# Patient Record
Sex: MALE | Race: WHITE | NOT HISPANIC OR LATINO | Employment: OTHER | URBAN - METROPOLITAN AREA
[De-identification: names, ages, dates, MRNs, and addresses within clinical notes are randomized per-mention and may not be internally consistent; named-entity substitution may affect disease eponyms.]

---

## 2017-02-08 ENCOUNTER — GENERIC CONVERSION - ENCOUNTER (OUTPATIENT)
Dept: OTHER | Facility: OTHER | Age: 82
End: 2017-02-08

## 2017-02-22 ENCOUNTER — GENERIC CONVERSION - ENCOUNTER (OUTPATIENT)
Dept: OTHER | Facility: OTHER | Age: 82
End: 2017-02-22

## 2017-06-22 ENCOUNTER — APPOINTMENT (OUTPATIENT)
Dept: LAB | Facility: CLINIC | Age: 82
End: 2017-06-22
Payer: MEDICARE

## 2017-06-22 ENCOUNTER — ALLSCRIPTS OFFICE VISIT (OUTPATIENT)
Dept: OTHER | Facility: OTHER | Age: 82
End: 2017-06-22

## 2017-06-22 DIAGNOSIS — E11.65 TYPE 2 DIABETES MELLITUS WITH HYPERGLYCEMIA (HCC): ICD-10-CM

## 2017-06-22 DIAGNOSIS — E11.8 TYPE 2 DIABETES MELLITUS WITH COMPLICATIONS (HCC): ICD-10-CM

## 2017-06-22 LAB
ALBUMIN SERPL BCP-MCNC: 3.7 G/DL (ref 3.5–5)
ALP SERPL-CCNC: 58 U/L (ref 46–116)
ALT SERPL W P-5'-P-CCNC: 25 U/L (ref 12–78)
ANION GAP SERPL CALCULATED.3IONS-SCNC: 9 MMOL/L (ref 4–13)
AST SERPL W P-5'-P-CCNC: 12 U/L (ref 5–45)
BACTERIA UR QL AUTO: NORMAL /HPF
BASOPHILS # BLD AUTO: 0.04 THOUSANDS/ΜL (ref 0–0.1)
BASOPHILS NFR BLD AUTO: 1 % (ref 0–1)
BILIRUB SERPL-MCNC: 0.48 MG/DL (ref 0.2–1)
BILIRUB UR QL STRIP: NEGATIVE
BUN SERPL-MCNC: 20 MG/DL (ref 5–25)
CALCIUM SERPL-MCNC: 9.3 MG/DL (ref 8.3–10.1)
CHLORIDE SERPL-SCNC: 91 MMOL/L (ref 100–108)
CLARITY UR: CLEAR
CO2 SERPL-SCNC: 28 MMOL/L (ref 21–32)
COLOR UR: YELLOW
CREAT SERPL-MCNC: 1.31 MG/DL (ref 0.6–1.3)
CREAT UR-MCNC: 105 MG/DL
EOSINOPHIL # BLD AUTO: 0.44 THOUSAND/ΜL (ref 0–0.61)
EOSINOPHIL NFR BLD AUTO: 5 % (ref 0–6)
ERYTHROCYTE [DISTWIDTH] IN BLOOD BY AUTOMATED COUNT: 13.7 % (ref 11.6–15.1)
EST. AVERAGE GLUCOSE BLD GHB EST-MCNC: 303 MG/DL
GFR SERPL CREATININE-BSD FRML MDRD: 51.6 ML/MIN/1.73SQ M
GLUCOSE SERPL-MCNC: 420 MG/DL (ref 65–140)
GLUCOSE UR STRIP-MCNC: ABNORMAL MG/DL
HBA1C MFR BLD: 12.2 % (ref 4.2–6.3)
HCT VFR BLD AUTO: 44.5 % (ref 36.5–49.3)
HGB BLD-MCNC: 14.4 G/DL (ref 12–17)
HGB UR QL STRIP.AUTO: NEGATIVE
KETONES UR STRIP-MCNC: NEGATIVE MG/DL
LDLC SERPL DIRECT ASSAY-MCNC: 135 MG/DL (ref 0–100)
LEUKOCYTE ESTERASE UR QL STRIP: NEGATIVE
LYMPHOCYTES # BLD AUTO: 1.82 THOUSANDS/ΜL (ref 0.6–4.47)
LYMPHOCYTES NFR BLD AUTO: 22 % (ref 14–44)
MCH RBC QN AUTO: 30.1 PG (ref 26.8–34.3)
MCHC RBC AUTO-ENTMCNC: 32.4 G/DL (ref 31.4–37.4)
MCV RBC AUTO: 93 FL (ref 82–98)
MICROALBUMIN UR-MCNC: 28.8 MG/L (ref 0–20)
MICROALBUMIN/CREAT 24H UR: 27 MG/G CREATININE (ref 0–30)
MONOCYTES # BLD AUTO: 0.73 THOUSAND/ΜL (ref 0.17–1.22)
MONOCYTES NFR BLD AUTO: 9 % (ref 4–12)
NEUTROPHILS # BLD AUTO: 5.37 THOUSANDS/ΜL (ref 1.85–7.62)
NEUTS SEG NFR BLD AUTO: 63 % (ref 43–75)
NITRITE UR QL STRIP: NEGATIVE
NON-SQ EPI CELLS URNS QL MICRO: NORMAL /HPF
NRBC BLD AUTO-RTO: 0 /100 WBCS
PH UR STRIP.AUTO: 5.5 [PH] (ref 4.5–8)
PLATELET # BLD AUTO: 349 THOUSANDS/UL (ref 149–390)
PMV BLD AUTO: 10.1 FL (ref 8.9–12.7)
POTASSIUM SERPL-SCNC: 5.4 MMOL/L (ref 3.5–5.3)
PROT SERPL-MCNC: 7.8 G/DL (ref 6.4–8.2)
PROT UR STRIP-MCNC: NEGATIVE MG/DL
RBC # BLD AUTO: 4.79 MILLION/UL (ref 3.88–5.62)
RBC #/AREA URNS AUTO: NORMAL /HPF
SODIUM SERPL-SCNC: 128 MMOL/L (ref 136–145)
SP GR UR STRIP.AUTO: 1.02 (ref 1–1.03)
TSH SERPL DL<=0.05 MIU/L-ACNC: 2.63 UIU/ML (ref 0.36–3.74)
UROBILINOGEN UR QL STRIP.AUTO: 0.2 E.U./DL
WBC # BLD AUTO: 8.44 THOUSAND/UL (ref 4.31–10.16)
WBC #/AREA URNS AUTO: NORMAL /HPF

## 2017-06-22 PROCEDURE — 81001 URINALYSIS AUTO W/SCOPE: CPT

## 2017-06-22 PROCEDURE — 82043 UR ALBUMIN QUANTITATIVE: CPT

## 2017-06-22 PROCEDURE — 85025 COMPLETE CBC W/AUTO DIFF WBC: CPT

## 2017-06-22 PROCEDURE — 80053 COMPREHEN METABOLIC PANEL: CPT

## 2017-06-22 PROCEDURE — 84443 ASSAY THYROID STIM HORMONE: CPT

## 2017-06-22 PROCEDURE — 36415 COLL VENOUS BLD VENIPUNCTURE: CPT

## 2017-06-22 PROCEDURE — 83721 ASSAY OF BLOOD LIPOPROTEIN: CPT

## 2017-06-22 PROCEDURE — 82570 ASSAY OF URINE CREATININE: CPT

## 2017-06-22 PROCEDURE — 83036 HEMOGLOBIN GLYCOSYLATED A1C: CPT

## 2017-08-02 ENCOUNTER — ALLSCRIPTS OFFICE VISIT (OUTPATIENT)
Dept: OTHER | Facility: OTHER | Age: 82
End: 2017-08-02

## 2018-01-08 ENCOUNTER — ALLSCRIPTS OFFICE VISIT (OUTPATIENT)
Dept: OTHER | Facility: OTHER | Age: 83
End: 2018-01-08

## 2018-01-09 NOTE — PROGRESS NOTES
Assessment   1  Frostbite with tissue necrosis of finger, initial encounter (991 1) (T34 432Y)    Discussion/Summary      Severe frostbite of the distal fingers  High risk of gangrene  Tetanus diphtheria given today  Above all, no further cold exposure  Keep the hand warm  Do not go outside unless absolutely necessary  referral if open lesions should develop  back next week  History of Present Illness   HPI: An 80-year-old man was diabetic fell outside and had severe cold exposure to his right hand but tick your for about 5 minutes  He has sustained second-degree frostbite  The right finger tips are cool and there are slight superficial blisters noted on 1 digit  The distal phalanges are swollen  paresthesias  open areas systemic symptoms  Review of Systems        Constitutional: no fever-- and-- no chills  Cardiovascular: no chest pain  Integumentary: skin wound, but-- as noted in HPI  Neurological: no numbness  Active Problems   1  Advance directive discussed with patient (V65 49) (Z71 89)   2  Alzheimer's dementia without behavioral disturbance, unspecified timing of dementia     onset (331 0,294 10) (G30 9,F02 80)   3  Coronary arteriosclerosis (414 00) (I25 10)   4  Diabetes mellitus type 2, uncontrolled, with complications (948 18) (I02 0,M36 42)   5  Dyspnea on exertion (786 09) (R06 09)   6  Encounter for prostate cancer screening (V76 44) (Z12 5)   7  Frequent falls (V15 88) (R29 6)   8  HHD (hypertensive heart disease) (402 90) (I11 9)   9  History of congestive heart failure (V12 59) (Z86 79)   10  Hyperlipidemia (272 4) (E78 5)   11  Hypertension (401 9) (I10)   12  Presence of cardiac pacemaker (V45 01) (Z95 0)   13  S/P CABG (coronary artery bypass graft) (V45 81) (Z95 1)   14   Screening for genitourinary condition (V81 6) (Z13 89)    Social History    · Alcohol Use (History)   · rarely   · Former smoker (L40 77) (U67 910)   · Former tobacco use (V15 82) (C22 400)  The social history was reviewed and updated today  Family History   Family History Reviewed: The family history was reviewed and updated today  Current Meds    1  Aspirin 81 MG TABS; TAKE 1 TABLET DAILY; Therapy: 10Sep2015 to (Evaluate:10Oct2015); Last Rx:10Sep2015 Ordered   2  BD Insulin Syringe Ultrafine 30G X 1/2 0 3 ML Miscellaneous; PT USES BID; Therapy: 09XTB4118 to (Last Rx:24Mar2017)  Requested for: 24Mar2017 Ordered   3  Donepezil HCl - 10 MG Oral Tablet; take 1 tablet by mouth once daily; Therapy: 29ETU1996 to (Evaluate:23Sep2018)  Requested for: 18HJV1317; Last     Rx:28Sep2017 Ordered   4  Furosemide 20 MG Oral Tablet; take 1 tablet by mouth once daily; Therapy: 69AJZ4078 to (Evaluate:18Apr2018)  Requested for: 48DMZ1825; Last     Rx:60Plj1233 Ordered   5  Lisinopril 2 5 MG Oral Tablet; take 1 tablet by mouth once daily; Therapy: 10Sep2015 to (Evaluate:09Aug2018)  Requested for: 07Zoj3811; Last     Rx:86Qcb1813 Ordered   6  MetFORMIN HCl  MG Oral Tablet Extended Release 24 Hour; TAKE 4 TABLET     Daily; Therapy: 49UPX9954 to (Evaluate:10Mar2018)  Requested for: 06HHJ3720; Last     Rx:15Mar2017 Ordered   7  Metoprolol Succinate ER 50 MG Oral Tablet Extended Release 24 Hour; take 1 tablet by     mouth once daily; Therapy: 10Sep2015 to (Renew:10Dec2017)  Requested for: 21FIJ8971; Last     Rx:13Jun2017 Ordered   8  Nitroglycerin 0 4 MG Sublingual Tablet Sublingual; PLACE 1 TABLET UNDER THE     TONGUE EVERY 5 MINUTES FOR UP TO 3 DOSES AS NEEDED FOR CHEST     PAIN  CALL 911 IF PAIN PERSISTS; Therapy: 73Uzk0512 to (Evaluate:04Sep2017)  Requested for: 54SRU2571; Last     Rx:08Nov2016 Ordered   9  NovoLIN 70/30 (70-30) 100 UNIT/ML Subcutaneous Suspension; Inject 30 Units     Subcutaneously Twice a Day; Therapy: 15QBP1393 to [phone])  Requested for: 50CJP9699; Last     Rx:04Jan2018 Ordered   10   Omeprazole 20 MG Oral Capsule Delayed Release; take 1 capsule by mouth once daily; Therapy: 07FWB7565 to (Evaluate:06Jun2017)  Requested for: 93GZO6959; Last      Rx:14Cpi6988 Ordered   11  Oxybutynin Chloride 5 MG Oral Tablet; take 1 tablet by mouth twice a day; Therapy: 58SVP0469 to (Evaluate:06Jun2017)  Requested for: 29VJJ3220; Last      Rx:06Drf8473 Ordered   12  Pravastatin Sodium 40 MG Oral Tablet; take 1 tablet by mouth once daily; Therapy: 74VOJ6637 to (Evaluate:25Rcd2792)  Requested for: 25FAL4639; Last      Rx:13Jun2017 Ordered    Allergies   1  No Known Drug Allergies    Vitals    Recorded: 44BCA1160 05:44PM   Heart Rate 86   Systolic 379   Diastolic 64   Height 5 ft 8 5 in   Weight 171 lb 4 0 oz   BMI Calculated 25 66   BSA Calculated 1 92   O2 Saturation 96     Physical Exam        Constitutional      General appearance: No acute distress, well appearing and well nourished  appearance reflects stated age  Pulmonary      Respiratory effort: No increased work of breathing or signs of respiratory distress         Skin      Examination of the skin for lesions: Abnormal  -- Swelling of right hand distal phalanges with no tenderness; 2 areas of very minimal       Psychiatric      Orientation to person, place and time: Normal        Mood and affect: Normal           Future Appointments      Date/Time Provider Specialty Site   05/04/2018 09:40 AM Cardiology, [facility]  [address] [address]    Electronically signed by : MARIA ELENA Hackett ; Jan 8 2018  5:56PM EST                       (Author)

## 2018-01-12 VITALS
WEIGHT: 181.25 LBS | HEART RATE: 83 BPM | BODY MASS INDEX: 26.84 KG/M2 | HEIGHT: 69 IN | SYSTOLIC BLOOD PRESSURE: 108 MMHG | OXYGEN SATURATION: 97 % | DIASTOLIC BLOOD PRESSURE: 64 MMHG

## 2018-01-13 NOTE — PROGRESS NOTES
Assessment    1  Encounter for preventive health examination (V70 0) (Z00 00)    Discussion/Summary  Impression: Initial Annual Wellness Visit, with preventive exam as well as age and risk appropriate counseling completed  Cardiovascular screening and counseling: screening not indicated and Dx - V81 2 Screen for CV Disorder  Diabetes screening and counseling: screening not indicated and Dx - V77 1 Screen for DM  Colorectal cancer screening and counseling: screening is current and Dx - V76 51 Screen for CRC  Prostate cancer screening and counseling: screening not indicated and Dx - V76 44 Screen PSA  Osteoporosis screening and counseling: counseling was given on obtaining adequate amounts of calcium and vitamin D on a daily basis  Abdominal aortic aneurysm screening and counseling: screening not indicated and Dx - V81 2 Screen for CV Disorder  Glaucoma screening and counseling: screening is current and Dx - V80 1 Screen for Glaucoma  HIV screening and counseling: screening not indicated  Immunizations: influenza vaccine is up to date this year, the lifetime pneumococcal vaccine has been completed and hepatitis B vaccination series is not indicated at this time due to the patient's low risk of jose the disease  Advance Directive Planning: complete and up to date  Patient Discussion: plan discussed with the patient, follow-up visit needed in one year  History of Present Illness  Welcome to Medicare and Wellness Visits: The patient is being seen for the initial annual wellness visit  Medicare Screening and Risk Factors   Hospitalizations: no previous hospitalizations  Once per lifetime medicare screening tests: AAA screening [place] has not yet been done  Medicare Screening Tests Risk Questions   Abdominal aortic aneurysm risk assessment: tobacco use and 40 yrs ago  Osteoporosis risk assessment:  and Shoulder, but none indicated  Drug and Alcohol Use:  The patient is a former cigarette smoker and quit smoking 40 yrs ago  The patient reports rare alcohol use  He has never used illicit drugs  Diet and Physical Activity: Current diet includes well balanced meals  He is sedentary  Mood Disorder and Cognitive Impairment Screening: He denies feeling down, depressed, or hopeless over the past two weeks  He denies feeling little interest or pleasure in doing things over the past two weeks  Cognitive impairment screening: denies difficulty learning/retaining new information, denies difficulty handling complex tasks, denies difficulty with reasoning, denies difficulty with spatial ability and orientation, denies difficulty with language and denies difficulty with behavior  Functional Ability/Level of Safety: Hearing is significantly decreased and a hearing aid is used  He reports hearing difficulties  The patient is currently able to do activities of daily living without limitations, able to participate in social activities without limitations and able to drive without limitations  Activities of daily living details: does not need help using the phone, no transportation help needed, does not need help shopping, no meal preparation help needed, does not need help doing housework, does not need help doing laundry, does not need help managing medications and does not need help managing money  Advance Directives: Advance directives: living will, durable power of  for health care directives and advance directives  Co-Managers and Medical Equipment/Suppliers: See Patient Care Team      Review of Systems    Over the past 2 weeks, how often have you been bothered by the following problems? 1 ) Little interest or pleasure in doing things? Not at all    2 ) Feeling down, depressed or hopeless? Not at all    3 ) Trouble falling asleep or sleeping too much? Not at all    4 ) Feeling tired or having little energy? Not at all    5 ) Poor appetite or overeating?  Not at all    6 ) Feeling bad about yourself, or that you are a failure, or have let yourself or your family down? Not at all    7 ) Trouble concentrating on things, such as reading a newspaper or watching television? Not at all    8 ) Moving or speaking so slowly that other people could have noticed, or the opposite, moving or speaking faster than usual? Not at all  How difficult have these problems made it for you to do your work, take care of things at home, or get along with people? Not at all  Score       Active Problems    1  Alzheimer's dementia without behavioral disturbance, unspecified timing of dementia   onset (331 0,294 10) (G30 9,F02 80)   2  Coronary arteriosclerosis (414 00) (I25 10)   3  Diabetes mellitus type 2, uncontrolled, with complications (979 96) (L59 1,P19 85)   4  Dyspnea on exertion (786 09) (R06 09)   5  Encounter for prostate cancer screening (V76 44) (Z12 5)   6  Frequent falls (V15 88) (R29 6)   7  HHD (hypertensive heart disease) (402 90) (I11 9)   8  History of congestive heart failure (V12 59) (Z86 79)   9  Hyperlipidemia (272 4) (E78 5)   10  Hypertension (401 9) (I10)   11  Presence of cardiac pacemaker (V45 01) (Z95 0)   12  S/P CABG (coronary artery bypass graft) (V45 81) (Z95 1)    Past Medical History    · History of Colonoscopy (Fiberoptic)   · History of congestive heart failure (V12 59) (Z86 79)    Surgical History    · History of CABG    Family History  Mother    · No pertinent family history    Social History    · Alcohol Use (History)   · rarely   · Former smoker (V15 82) (N34 294)    Current Meds   1  Aspirin 81 MG TABS; TAKE 1 TABLET DAILY; Therapy: 86Uwq3242 to (Evaluate:10Oct2015); Last Rx:25Wmi0974 Ordered   2  BD Insulin Syringe Ultrafine 30G X 1/2" 0 3 ML Miscellaneous; PT USES BID; Therapy: 16FZR3880 to (Last Rx:24Mar2017)  Requested for: 24Mar2017 Ordered   3   Donepezil HCl - 10 MG Oral Tablet; TAKE 1 TAB QD;   Therapy: 19IQV2362 to (Last Rx:07Nov2016)  Requested for: 40JZF8842 Ordered   4  Furosemide 20 MG Oral Tablet; take 1 tablet by mouth once daily; Therapy: 07OWQ3546 to (Catalina Sherwood)  Requested for: 71SVO4647; Last   RX:26XSK6161 Ordered   5  Lisinopril 2 5 MG Oral Tablet; take 1 tablet by mouth once daily; Therapy: 82Exo6386 to (Evaluate:24Tty4558)  Requested for: 64Xrj9120; Last   Rx:13Apr2017 Ordered   6  MetFORMIN HCl  MG Oral Tablet Extended Release 24 Hour; TAKE 4 TABLET   Daily; Therapy: 73NCJ2069 to (Evaluate:10Mar2018)  Requested for: 32XSS2446; Last   Rx:15Mar2017 Ordered   7  Metoprolol Succinate ER 50 MG Oral Tablet Extended Release 24 Hour; take 1 tablet by   mouth once daily; Therapy: 54Vkg4551 to (Renew:45Ics8881)  Requested for: 36YPC2717; Last   Rx:13Jun2017 Ordered   8  Nitroglycerin 0 4 MG Sublingual Tablet Sublingual; PLACE 1 TABLET UNDER THE   TONGUE EVERY 5 MINUTES FOR UP TO 3 DOSES AS NEEDED FOR CHEST   PAIN  CALL 911 IF PAIN PERSISTS; Therapy: 94Jgz9339 to (Evaluate:04Sep2017)  Requested for: 29TAH7110; Last   Rx:08Nov2016 Ordered   9  NovoLIN 70/30 (70-30) 100 UNIT/ML Subcutaneous Suspension; pt  to inject 30 units   bid; Therapy: 47KQK5167 to (Last Rx:24Mar2017)  Requested for: 24Mar2017 Ordered   10  Omeprazole 20 MG Oral Capsule Delayed Release; take 1 capsule by mouth once daily; Therapy: 12MVM5497 to (Evaluate:06Jun2017)  Requested for: 95YZN4973; Last    Rx:81Nap2187 Ordered   11  Oxybutynin Chloride 5 MG Oral Tablet; take 1 tablet by mouth twice a day; Therapy: 40MNJ1648 to (Evaluate:06Jun2017)  Requested for: 44IEH9298; Last    Rx:06Feb2017 Ordered   12  Pravastatin Sodium 40 MG Oral Tablet; take 1 tablet by mouth once daily; Therapy: 49VKY1580 to (Evaluate:11Sep2017)  Requested for: 75TIE5268; Last    Rx:13Jun2017 Ordered    Allergies    1   No Known Drug Allergies    Immunizations   1 2 3 4    Influenza  05-Nov-2009 06-Oct-2010 26-Aug-2013 25-Sep-2015    PPSV  27-Jul-2015       Tdap  possibly over 10 yrs within 10yrs       Vitals  Signs    Heart Rate: 76  Systolic: 571  Diastolic: 50  Height: 5 ft 8 5 in  Weight: 176 lb 4 00 oz  BMI Calculated: 26 41  BSA Calculated: 1 94  O2 Saturation: 98    Future Appointments    Date/Time Provider Specialty Site   05/04/2018 09:40 AM Cardiology, [facility] Clin [facility]  [address]     Signatures   Electronically signed by : MARIA ELENA Willis ; Jun 22 2017  1:18PM EST                       (Author)

## 2018-01-14 VITALS
HEIGHT: 69 IN | OXYGEN SATURATION: 98 % | DIASTOLIC BLOOD PRESSURE: 50 MMHG | BODY MASS INDEX: 26.11 KG/M2 | WEIGHT: 176.25 LBS | SYSTOLIC BLOOD PRESSURE: 110 MMHG | HEART RATE: 76 BPM

## 2018-01-17 ENCOUNTER — GENERIC CONVERSION - ENCOUNTER (OUTPATIENT)
Dept: OTHER | Facility: OTHER | Age: 83
End: 2018-01-17

## 2018-01-23 VITALS
HEART RATE: 86 BPM | DIASTOLIC BLOOD PRESSURE: 64 MMHG | BODY MASS INDEX: 25.36 KG/M2 | SYSTOLIC BLOOD PRESSURE: 124 MMHG | HEIGHT: 69 IN | OXYGEN SATURATION: 96 % | WEIGHT: 171.25 LBS

## 2018-01-24 VITALS
HEART RATE: 81 BPM | HEIGHT: 69 IN | SYSTOLIC BLOOD PRESSURE: 130 MMHG | WEIGHT: 165 LBS | BODY MASS INDEX: 24.44 KG/M2 | DIASTOLIC BLOOD PRESSURE: 70 MMHG

## 2018-03-02 DIAGNOSIS — E11.22 TYPE 2 DIABETES MELLITUS WITH STAGE 4 CHRONIC KIDNEY DISEASE, WITH LONG-TERM CURRENT USE OF INSULIN (HCC): Primary | ICD-10-CM

## 2018-03-02 DIAGNOSIS — N18.4 TYPE 2 DIABETES MELLITUS WITH STAGE 4 CHRONIC KIDNEY DISEASE, WITH LONG-TERM CURRENT USE OF INSULIN (HCC): Primary | ICD-10-CM

## 2018-03-02 DIAGNOSIS — Z79.4 TYPE 2 DIABETES MELLITUS WITH STAGE 4 CHRONIC KIDNEY DISEASE, WITH LONG-TERM CURRENT USE OF INSULIN (HCC): Primary | ICD-10-CM

## 2018-03-02 RX ORDER — HUMAN INSULIN 100 [USP'U]/ML
INJECTION, SUSPENSION SUBCUTANEOUS
Qty: 10 ML | Refills: 5 | Status: SHIPPED | OUTPATIENT
Start: 2018-03-02 | End: 2019-03-26 | Stop reason: SDUPTHER

## 2018-03-15 ENCOUNTER — OFFICE VISIT (OUTPATIENT)
Dept: INTERNAL MEDICINE CLINIC | Facility: CLINIC | Age: 83
End: 2018-03-15
Payer: MEDICARE

## 2018-03-15 VITALS
DIASTOLIC BLOOD PRESSURE: 68 MMHG | HEART RATE: 79 BPM | BODY MASS INDEX: 24.22 KG/M2 | SYSTOLIC BLOOD PRESSURE: 122 MMHG | OXYGEN SATURATION: 96 % | WEIGHT: 169.2 LBS | HEIGHT: 70 IN

## 2018-03-15 DIAGNOSIS — H61.23 BILATERAL IMPACTED CERUMEN: Primary | ICD-10-CM

## 2018-03-15 PROCEDURE — 99213 OFFICE O/P EST LOW 20 MIN: CPT | Performed by: INTERNAL MEDICINE

## 2018-03-16 PROBLEM — H61.23 BILATERAL IMPACTED CERUMEN: Status: ACTIVE | Noted: 2018-03-16

## 2018-03-16 NOTE — PROGRESS NOTES
Assessment/Plan:       There are no diagnoses linked to this encounter  Subjective:      Patient ID: Ancelmo Patrick is a 80 y o  male  Referred from audiology with bilateral wax impactions        The following portions of the patient's history were reviewed and updated as appropriate:   He has a past medical history of Congestive heart failure (CHF) ([facility] [address] ); Diabetes mellitus ([facility] [address] ); Hyperlipidemia; and Hypertension  ,   does not have a problem list on file  ,   has a past surgical history that includes Colonoscopy and Coronary artery bypass graft  ,  family history includes No Known Problems in his mother  ,   reports that he has quit smoking  He has quit using smokeless tobacco  He reports that he drinks alcohol  His drug history is not on file  ,  has No Known Allergies     Current Outpatient Prescriptions   Medication Sig Dispense Refill    acetaminophen-codeine (TYLENOL #3) 300-30 mg per tablet take 1 tablet by mouth every 4 hours if needed for pain  0    aspirin 81 MG tablet Take 1 tablet by mouth daily      donepezil (ARICEPT) 10 mg tablet Take 1 tablet by mouth daily      furosemide (LASIX) 20 mg tablet Take by mouth      insulin isophane-insulin regular (HUMULIN 70/30 KWIKPEN) 100 Units/mL SUPN Inject under the skin      Insulin Syringe-Needle U-100 (B-D INS SYR ULTRAFINE  3CC/30G) 30G X 1/2" 0 3 ML MISC by Does not apply route      lisinopril (ZESTRIL) 2 5 mg tablet Take by mouth      metFORMIN (GLUMETZA) 1000 MG (MOD) 24 hr tablet Take by mouth 2 (two) times a day with meals      metoprolol succinate (TOPROL-XL) 50 mg 24 hr tablet Take by mouth      nitroglycerin (NITROSTAT) 0 4 mg SL tablet Place under the tongue      NOVOLIN 70/30 (70-30) 100 UNIT/ML subcutaneous injection inject 30 units subcutaneously twice a day 10 mL 5    omeprazole (PriLOSEC) 10 mg delayed release capsule Take by mouth      oxybutynin (DITROPAN) 5 mg tablet Take by mouth      pravastatin (PRAVACHOL) 40 mg tablet Take by mouth       No current facility-administered medications for this visit  Review of Systems   Constitutional: Negative for activity change and appetite change  HENT: Positive for hearing loss  Negative for postnasal drip, sinus pain and sinus pressure  Respiratory: Negative for cough and shortness of breath  Objective:  Vitals:    03/15/18 1343   BP: 122/68   Pulse: 79   SpO2: 96%      Physical Exam   Constitutional:   Elderly and appears stated age, a bit thin, but in no immediate distress  HENT:   Right Ear: Decreased hearing is noted  Left Ear: Decreased hearing is noted  Bilateral wax impaction    Procedure note:  Informed consent given verbally  Both ears flushed with warm water until clear; both had impactions, the right much worse  After which, mucosa was normal with no laceration and tympanic membranes were just a tiny bit dull  Hearing back to baseline  Cardiovascular: Normal rate  Pulmonary/Chest: Effort normal and breath sounds normal  No respiratory distress

## 2018-05-15 DIAGNOSIS — N40.0 BENIGN PROSTATIC HYPERPLASIA, UNSPECIFIED WHETHER LOWER URINARY TRACT SYMPTOMS PRESENT: Primary | ICD-10-CM

## 2018-05-15 RX ORDER — OXYBUTYNIN CHLORIDE 5 MG/1
TABLET ORAL
Qty: 60 TABLET | Refills: 3 | Status: SHIPPED | OUTPATIENT
Start: 2018-05-15 | End: 2019-06-07 | Stop reason: SDUPTHER

## 2018-08-08 LAB — HBA1C MFR BLD HPLC: 13.9 %

## 2018-08-09 ENCOUNTER — TELEPHONE (OUTPATIENT)
Dept: INTERNAL MEDICINE CLINIC | Facility: CLINIC | Age: 83
End: 2018-08-09

## 2018-08-09 NOTE — TELEPHONE ENCOUNTER
MARTHA Santos CALLED AND SAID PATIENT IS IN [place] [address] AND THEY WANT THE OFFICE NOTES FROM 8/2/17 AND THE OFFICE NOTES FROM 6/22/17   ALSO NEEDS THE ECHO FROM 11/30/15 AND THE EKG FROM 11/4/16 FAXED OVER  FAX #  [phone]  OFFICE #  [phone] TA[facility]

## 2018-08-27 ENCOUNTER — OFFICE VISIT (OUTPATIENT)
Dept: INTERNAL MEDICINE CLINIC | Facility: CLINIC | Age: 83
End: 2018-08-27
Payer: MEDICARE

## 2018-08-27 VITALS
HEART RATE: 77 BPM | SYSTOLIC BLOOD PRESSURE: 110 MMHG | BODY MASS INDEX: 25.09 KG/M2 | TEMPERATURE: 98.1 F | OXYGEN SATURATION: 97 % | DIASTOLIC BLOOD PRESSURE: 60 MMHG | WEIGHT: 172.4 LBS

## 2018-08-27 DIAGNOSIS — F02.80 ALZHEIMER'S DEMENTIA WITHOUT BEHAVIORAL DISTURBANCE, UNSPECIFIED TIMING OF DEMENTIA ONSET (HCC): ICD-10-CM

## 2018-08-27 DIAGNOSIS — G30.9 ALZHEIMER'S DEMENTIA WITHOUT BEHAVIORAL DISTURBANCE, UNSPECIFIED TIMING OF DEMENTIA ONSET (HCC): ICD-10-CM

## 2018-08-27 DIAGNOSIS — Z79.4 TYPE 2 DIABETES MELLITUS WITHOUT COMPLICATION, WITH LONG-TERM CURRENT USE OF INSULIN (HCC): Primary | ICD-10-CM

## 2018-08-27 DIAGNOSIS — I10 HYPERTENSION, ESSENTIAL: ICD-10-CM

## 2018-08-27 DIAGNOSIS — E11.9 TYPE 2 DIABETES MELLITUS WITHOUT COMPLICATION, WITH LONG-TERM CURRENT USE OF INSULIN (HCC): Primary | ICD-10-CM

## 2018-08-27 PROCEDURE — 99214 OFFICE O/P EST MOD 30 MIN: CPT | Performed by: PHYSICIAN ASSISTANT

## 2018-08-27 NOTE — PROGRESS NOTES
Assessment/Plan:  I filled out his 's license form ensuring that he will not be able to drive blood sugars are badly out of control his memory is poor his insight is poor he is living alone but he needs assisted living I discussed this with his daughter-in-law  I reviewed all of his recent labs       Diagnoses and all orders for this visit:    Type 2 diabetes mellitus without complication, with long-term current use of insulin (HCC)    Hypertension, essential    Alzheimer's dementia without behavioral disturbance, unspecified timing of dementia onset        No problem-specific Assessment & Plan notes found for this encounter  Subjective:      Patient ID: Saint Lively is a 80 y o  male  He is here for forms to be filled out so he can drive a car  He was recently hospitalized at [facility] with recurrent fall  He is a previous history of fractured C2 treated last year with a hard collar  His c2 fracture was noted to be more open  He also had several rib fractures on the left  His rib fractures do not bother him no chest pain or shortness of breath his followed closely by neuro surgery and a hard collar was recommended  He was discharged to his home his A1c was 13  He lives alone he takes care of his own medications  He is supposedly starting with visiting nurses tomorrow  His hygiene is been poor  His wife  a few months ago      Neck Injury    Pertinent negatives include no chest pain, fever, headaches, numbness, photophobia, trouble swallowing or weakness  The following portions of the patient's history were reviewed and updated as appropriate:   He has a past medical history of Congestive heart failure (CHF) ([facility] [address] ); Diabetes mellitus ([facility] [address] ); History of colonoscopy; Hyperlipidemia; and Hypertension  ,   does not have any pertinent problems on file  ,   has a past surgical history that includes Colonoscopy and Coronary artery bypass graft  ,  family history includes No Known Problems in his mother  ,   reports that he has quit smoking  He has never used smokeless tobacco  He reports that he drinks alcohol  He reports that he does not use drugs  ,  has No Known Allergies     Current Outpatient Prescriptions   Medication Sig Dispense Refill    aspirin 81 MG tablet Take 1 tablet by mouth daily      insulin isophane-insulin regular (HUMULIN 70/30 KWIKPEN) 100 Units/mL SUPN Inject under the skin      Insulin Syringe-Needle U-100 (B-D INS SYR ULTRAFINE  3CC/30G) 30G X 1/2" 0 3 ML MISC by Does not apply route      lisinopril (ZESTRIL) 2 5 mg tablet Take by mouth      metFORMIN (GLUMETZA) 1000 MG (MOD) 24 hr tablet Take by mouth 2 (two) times a day with meals      NOVOLIN 70/30 (70-30) 100 UNIT/ML subcutaneous injection inject 30 units subcutaneously twice a day 10 mL 5    omeprazole (PriLOSEC) 10 mg delayed release capsule Take by mouth      acetaminophen-codeine (TYLENOL #3) 300-30 mg per tablet take 1 tablet by mouth every 4 hours if needed for pain  0    donepezil (ARICEPT) 10 mg tablet Take 1 tablet by mouth daily      furosemide (LASIX) 20 mg tablet Take by mouth      metoprolol succinate (TOPROL-XL) 50 mg 24 hr tablet Take by mouth      nitroglycerin (NITROSTAT) 0 4 mg SL tablet Place under the tongue      oxybutynin (DITROPAN) 5 mg tablet take 1 tablet by mouth twice a day (Patient not taking: Reported on 8/27/2018) 60 tablet 3    pravastatin (PRAVACHOL) 40 mg tablet Take by mouth       No current facility-administered medications for this visit  Review of Systems   Constitutional: Negative for activity change, appetite change, chills, diaphoresis, fatigue, fever and unexpected weight change  HENT: Negative for congestion, dental problem, drooling, ear discharge, ear pain, facial swelling, hearing loss, nosebleeds, postnasal drip, rhinorrhea, sinus pain, sinus pressure, sneezing, sore throat, tinnitus, trouble swallowing and voice change      Eyes: Negative for photophobia, pain, discharge, redness, itching and visual disturbance  Respiratory: Negative for apnea, cough, choking, chest tightness, shortness of breath, wheezing and stridor  Cardiovascular: Negative for chest pain, palpitations and leg swelling  Gastrointestinal: Negative for abdominal distention, abdominal pain, anal bleeding, blood in stool, constipation, diarrhea, nausea, rectal pain and vomiting  Endocrine: Negative for cold intolerance, heat intolerance, polydipsia, polyphagia and polyuria  Genitourinary: Negative for decreased urine volume, difficulty urinating, dysuria, enuresis, flank pain, frequency, genital sores, hematuria and urgency  Musculoskeletal: Negative for arthralgias, back pain, gait problem, joint swelling, myalgias, neck pain and neck stiffness  Skin: Negative for color change, pallor, rash and wound  Neurological: Negative for dizziness, tremors, seizures, syncope, facial asymmetry, speech difficulty, weakness, light-headedness, numbness and headaches  Hematological: Negative for adenopathy  Does not bruise/bleed easily  Psychiatric/Behavioral: Positive for confusion  Negative for agitation, behavioral problems, decreased concentration, dysphoric mood, hallucinations, self-injury, sleep disturbance and suicidal ideas  The patient is not nervous/anxious and is not hyperactive  Objective:  Vitals:    08/27/18 1621   BP: 110/60   BP Location: Left arm   Patient Position: Sitting   Cuff Size: Standard   Pulse: 77   Temp: 98 1 °F (36 7 °C)   SpO2: 97%   Weight: 78 2 kg (172 lb 6 4 oz)     Body mass index is 25 09 kg/m²  Physical Exam   Constitutional: He is oriented to person, place, and time  He appears well-developed  HENT:   Head: Normocephalic  Right Ear: External ear normal    Left Ear: External ear normal    Mouth/Throat: Oropharynx is clear and moist    Eyes: Conjunctivae are normal  Pupils are equal, round, and reactive to light     Neck: Neck supple  No thyromegaly present  Cardiovascular: Normal rate, regular rhythm, normal heart sounds and intact distal pulses  No murmur heard  Pulmonary/Chest: Effort normal and breath sounds normal  No respiratory distress  Abdominal: Soft  Bowel sounds are normal  He exhibits no distension  Musculoskeletal: Normal range of motion  Neurological: He is alert and oriented to person, place, and time  He displays abnormal reflex (Symmetrically diminished)  No cranial nerve deficit  He exhibits normal muscle tone  Abnormal coordination: Wide-based ataxic gait  Skin: Skin is warm and dry     Psychiatric:   Poor judgment poor memory

## 2018-10-01 ENCOUNTER — OFFICE VISIT (OUTPATIENT)
Dept: INTERNAL MEDICINE CLINIC | Facility: CLINIC | Age: 83
End: 2018-10-01
Payer: MEDICARE

## 2018-10-01 VITALS
BODY MASS INDEX: 25.24 KG/M2 | OXYGEN SATURATION: 94 % | HEART RATE: 87 BPM | WEIGHT: 173.4 LBS | DIASTOLIC BLOOD PRESSURE: 72 MMHG | SYSTOLIC BLOOD PRESSURE: 120 MMHG | TEMPERATURE: 97.7 F

## 2018-10-01 DIAGNOSIS — I10 HYPERTENSION, ESSENTIAL: ICD-10-CM

## 2018-10-01 DIAGNOSIS — Z79.4 TYPE 2 DIABETES MELLITUS WITHOUT COMPLICATION, WITH LONG-TERM CURRENT USE OF INSULIN (HCC): ICD-10-CM

## 2018-10-01 DIAGNOSIS — G30.9 ALZHEIMER'S DEMENTIA WITHOUT BEHAVIORAL DISTURBANCE, UNSPECIFIED TIMING OF DEMENTIA ONSET (HCC): ICD-10-CM

## 2018-10-01 DIAGNOSIS — E11.9 TYPE 2 DIABETES MELLITUS WITHOUT COMPLICATION, UNSPECIFIED WHETHER LONG TERM INSULIN USE (HCC): Primary | ICD-10-CM

## 2018-10-01 DIAGNOSIS — F02.80 ALZHEIMER'S DEMENTIA WITHOUT BEHAVIORAL DISTURBANCE, UNSPECIFIED TIMING OF DEMENTIA ONSET (HCC): ICD-10-CM

## 2018-10-01 DIAGNOSIS — E11.9 TYPE 2 DIABETES MELLITUS WITHOUT COMPLICATION, WITH LONG-TERM CURRENT USE OF INSULIN (HCC): ICD-10-CM

## 2018-10-01 LAB
LEFT EYE DIABETIC RETINOPATHY: NORMAL
LEFT EYE IMAGE QUALITY: NORMAL
RIGHT EYE DIABETIC RETINOPATHY: NORMAL
RIGHT EYE IMAGE QUALITY: NORMAL
SEVERITY (EYE EXAM): NORMAL

## 2018-10-01 PROCEDURE — 92250 FUNDUS PHOTOGRAPHY W/I&R: CPT | Performed by: PHYSICIAN ASSISTANT

## 2018-10-01 PROCEDURE — 99214 OFFICE O/P EST MOD 30 MIN: CPT | Performed by: PHYSICIAN ASSISTANT

## 2018-10-01 NOTE — PROGRESS NOTES
Assessment/Plan:  He is here with his daughter-in-law  He still wants to drive his sugars are badly out of control because he forgets to take his insulin  I stressed to him that it is now time for him to have assisted living for help with his ADLs and his medication  His memory is poor his judgment is not good  We will be will consider assisted living he has a bed       Diagnoses and all orders for this visit:    Type 2 diabetes mellitus without complication, unspecified whether long term insulin use ([facility] )  -     POCT diabetic eye exam    Type 2 diabetes mellitus without complication, with long-term current use of insulin ([facility])    Hypertension, essential    Alzheimer's dementia without behavioral disturbance, unspecified timing of dementia onset        No problem-specific Assessment & Plan notes found for this encounter  Subjective:      Patient ID: Mee Gomes is a 80 y o  male  He is back for follow-up his blood sugars are still generally in the 300s not taking his medications properly he has nurse's aide 3 hours a day in the morning to help with his meds and ADLs but this is not enough  He still wants to be able to drive  He feels well has no complaints his appetite is good no recent falls        The following portions of the patient's history were reviewed and updated as appropriate:   He has a past medical history of Congestive heart failure (CHF) ([facility] ); Diabetes mellitus ([facility] ); History of colonoscopy; Hyperlipidemia; and Hypertension  ,   does not have any pertinent problems on file  ,   has a past surgical history that includes Colonoscopy and Coronary artery bypass graft  ,  family history includes No Known Problems in his mother  ,   reports that he has quit smoking  He has never used smokeless tobacco  He reports that he drinks alcohol  He reports that he does not use drugs  ,  has No Known Allergies     Current Outpatient Prescriptions   Medication Sig Dispense Refill    donepezil (ARICEPT) 10 mg tablet Take 1 tablet by mouth daily      furosemide (LASIX) 20 mg tablet Take by mouth      insulin isophane-insulin regular (HUMULIN 70/30 KWIKPEN) 100 Units/mL SUPN Inject under the skin      Insulin Syringe-Needle U-100 (B-D INS SYR ULTRAFINE  3CC/30G) 30G X 1/2" 0 3 ML MISC by Does not apply route      lisinopril (ZESTRIL) 2 5 mg tablet Take by mouth      metFORMIN (GLUMETZA) 1000 MG (MOD) 24 hr tablet Take by mouth 2 (two) times a day with meals      metoprolol succinate (TOPROL-XL) 50 mg 24 hr tablet Take by mouth      nitroglycerin (NITROSTAT) 0 4 mg SL tablet Place under the tongue      NOVOLIN 70/30 (70-30) 100 UNIT/ML subcutaneous injection inject 30 units subcutaneously twice a day 10 mL 5    omeprazole (PriLOSEC) 10 mg delayed release capsule Take by mouth      oxybutynin (DITROPAN) 5 mg tablet take 1 tablet by mouth twice a day 60 tablet 3    pravastatin (PRAVACHOL) 40 mg tablet Take by mouth      acetaminophen-codeine (TYLENOL #3) 300-30 mg per tablet take 1 tablet by mouth every 4 hours if needed for pain  0    aspirin 81 MG tablet Take 1 tablet by mouth daily       No current facility-administered medications for this visit  Review of Systems   Constitutional: Negative for activity change, appetite change, chills, diaphoresis, fatigue, fever and unexpected weight change  HENT: Negative for congestion, dental problem, drooling, ear discharge, ear pain, facial swelling, hearing loss, nosebleeds, postnasal drip, rhinorrhea, sinus pain, sinus pressure, sneezing, sore throat, tinnitus, trouble swallowing and voice change  Eyes: Negative for photophobia, pain, discharge, redness, itching and visual disturbance  Respiratory: Negative for apnea, cough, choking, chest tightness, shortness of breath, wheezing and stridor  Cardiovascular: Negative for chest pain, palpitations and leg swelling     Gastrointestinal: Negative for abdominal distention, abdominal pain, anal bleeding, blood in stool, constipation, diarrhea, nausea, rectal pain and vomiting  Endocrine: Negative for cold intolerance, heat intolerance, polydipsia, polyphagia and polyuria  Genitourinary: Negative for decreased urine volume, difficulty urinating, dysuria, enuresis, flank pain, frequency, genital sores, hematuria and urgency  Musculoskeletal: Negative for arthralgias, back pain, gait problem, joint swelling, myalgias, neck pain and neck stiffness  Skin: Negative for color change, pallor, rash and wound  Neurological: Negative for dizziness, tremors, seizures, syncope, facial asymmetry, speech difficulty, weakness, light-headedness, numbness and headaches  Hematological: Negative for adenopathy  Does not bruise/bleed easily  Psychiatric/Behavioral: Positive for behavioral problems and confusion  Negative for agitation, decreased concentration, dysphoric mood, hallucinations, self-injury, sleep disturbance and suicidal ideas  The patient is not nervous/anxious and is not hyperactive  Objective:  Vitals:    10/01/18 1600   BP: 120/72   BP Location: Left arm   Patient Position: Sitting   Cuff Size: Standard   Pulse: 87   Temp: 97 7 °F (36 5 °C)   SpO2: 94%   Weight: 78 7 kg (173 lb 6 4 oz)     Body mass index is 25 24 kg/m²  Physical Exam   Constitutional: He is oriented to person, place, and time  He appears well-developed  HENT:   Head: Normocephalic  Right Ear: External ear normal    Left Ear: External ear normal    Mouth/Throat: Oropharynx is clear and moist    Eyes: Pupils are equal, round, and reactive to light  Conjunctivae are normal    Neck: Neck supple  No thyromegaly present  Cardiovascular: Normal rate, regular rhythm, normal heart sounds and intact distal pulses  No murmur heard  Pulmonary/Chest: Effort normal and breath sounds normal  No respiratory distress  He exhibits no tenderness  Abdominal: Soft   Bowel sounds are normal  He exhibits no distension and no mass  Musculoskeletal: Normal range of motion  Lymphadenopathy:     He has no cervical adenopathy  Neurological: He is alert and oriented to person, place, and time  He has normal reflexes  Skin: Skin is warm and dry     Psychiatric:   Poor memory confabulate

## 2018-12-06 ENCOUNTER — IN-CLINIC DEVICE VISIT (OUTPATIENT)
Dept: CARDIOLOGY CLINIC | Facility: CLINIC | Age: 83
End: 2018-12-06
Payer: MEDICARE

## 2018-12-06 DIAGNOSIS — I44.2 CHB (COMPLETE HEART BLOCK) (HCC): Primary | ICD-10-CM

## 2018-12-06 DIAGNOSIS — Z95.0 PRESENCE OF PERMANENT CARDIAC PACEMAKER: ICD-10-CM

## 2018-12-06 PROCEDURE — 93280 PM DEVICE PROGR EVAL DUAL: CPT | Performed by: INTERNAL MEDICINE

## 2018-12-06 NOTE — PROGRESS NOTES
MDT DUAL CHAMBER PM  DEVICE INTERROGATED IN THE [place] OFFICE:  BATTERY VOLTAGE ADEQUATE (4 5 YR)   AP 10 9%  99 9% (>40%/CHB)   ALL LEAD PARAMETERS WITHIN NORMAL LIMITS   4 AHR EPISODES (<1%) WITH EGMS SHOWING AT/AF   LONGEST EPISODE 7 MIN  28 SEC     PT TAKES ASA 81 MG, AND METOPROLOL   TASKED TO DR ODOM FOR REVIEW   NO PROGRAMMING CHANGES MADE TO DEVICE PARAMETERS   NORMAL DEVICE FUNCTION   RG

## 2018-12-11 ENCOUNTER — TELEPHONE (OUTPATIENT)
Dept: INTERNAL MEDICINE CLINIC | Facility: CLINIC | Age: 83
End: 2018-12-11

## 2018-12-20 ENCOUNTER — APPOINTMENT (OUTPATIENT)
Dept: LAB | Facility: CLINIC | Age: 83
End: 2018-12-20
Payer: MEDICARE

## 2018-12-20 ENCOUNTER — TELEPHONE (OUTPATIENT)
Dept: INTERNAL MEDICINE CLINIC | Facility: CLINIC | Age: 83
End: 2018-12-20

## 2018-12-20 ENCOUNTER — OFFICE VISIT (OUTPATIENT)
Dept: INTERNAL MEDICINE CLINIC | Facility: CLINIC | Age: 83
End: 2018-12-20
Payer: MEDICARE

## 2018-12-20 VITALS
SYSTOLIC BLOOD PRESSURE: 150 MMHG | BODY MASS INDEX: 25.04 KG/M2 | OXYGEN SATURATION: 96 % | WEIGHT: 165.2 LBS | HEIGHT: 68 IN | DIASTOLIC BLOOD PRESSURE: 86 MMHG | HEART RATE: 89 BPM

## 2018-12-20 DIAGNOSIS — E11.9 TYPE 2 DIABETES MELLITUS WITHOUT COMPLICATION, WITH LONG-TERM CURRENT USE OF INSULIN (HCC): ICD-10-CM

## 2018-12-20 DIAGNOSIS — F02.80 ALZHEIMER'S DEMENTIA WITHOUT BEHAVIORAL DISTURBANCE, UNSPECIFIED TIMING OF DEMENTIA ONSET (HCC): ICD-10-CM

## 2018-12-20 DIAGNOSIS — G30.9 ALZHEIMER'S DEMENTIA WITHOUT BEHAVIORAL DISTURBANCE, UNSPECIFIED TIMING OF DEMENTIA ONSET (HCC): ICD-10-CM

## 2018-12-20 DIAGNOSIS — E78.2 MIXED HYPERLIPIDEMIA: ICD-10-CM

## 2018-12-20 DIAGNOSIS — H61.23 BILATERAL IMPACTED CERUMEN: ICD-10-CM

## 2018-12-20 DIAGNOSIS — I50.9 CHRONIC CONGESTIVE HEART FAILURE, UNSPECIFIED HEART FAILURE TYPE (HCC): ICD-10-CM

## 2018-12-20 DIAGNOSIS — I10 HYPERTENSION, ESSENTIAL: ICD-10-CM

## 2018-12-20 DIAGNOSIS — Z79.4 TYPE 2 DIABETES MELLITUS WITHOUT COMPLICATION, WITH LONG-TERM CURRENT USE OF INSULIN (HCC): ICD-10-CM

## 2018-12-20 DIAGNOSIS — E11.9 TYPE 2 DIABETES MELLITUS WITHOUT COMPLICATION, WITH LONG-TERM CURRENT USE OF INSULIN (HCC): Primary | ICD-10-CM

## 2018-12-20 DIAGNOSIS — Z79.4 TYPE 2 DIABETES MELLITUS WITHOUT COMPLICATION, WITH LONG-TERM CURRENT USE OF INSULIN (HCC): Primary | ICD-10-CM

## 2018-12-20 LAB
ANION GAP SERPL CALCULATED.3IONS-SCNC: 10 MMOL/L (ref 4–13)
BASOPHILS # BLD AUTO: 0.07 THOUSANDS/ΜL (ref 0–0.1)
BASOPHILS NFR BLD AUTO: 1 % (ref 0–1)
BUN SERPL-MCNC: 23 MG/DL (ref 5–25)
CALCIUM SERPL-MCNC: 9.5 MG/DL (ref 8.3–10.1)
CHLORIDE SERPL-SCNC: 90 MMOL/L (ref 100–108)
CO2 SERPL-SCNC: 26 MMOL/L (ref 21–32)
CREAT SERPL-MCNC: 1.43 MG/DL (ref 0.6–1.3)
EOSINOPHIL # BLD AUTO: 0.26 THOUSAND/ΜL (ref 0–0.61)
EOSINOPHIL NFR BLD AUTO: 3 % (ref 0–6)
ERYTHROCYTE [DISTWIDTH] IN BLOOD BY AUTOMATED COUNT: 12.9 % (ref 11.6–15.1)
EST. AVERAGE GLUCOSE BLD GHB EST-MCNC: 349 MG/DL
GFR SERPL CREATININE-BSD FRML MDRD: 43 ML/MIN/1.73SQ M
GLUCOSE SERPL-MCNC: 575 MG/DL (ref 65–140)
HBA1C MFR BLD: 13.8 % (ref 4.2–6.3)
HCT VFR BLD AUTO: 49.6 % (ref 36.5–49.3)
HGB BLD-MCNC: 16.3 G/DL (ref 12–17)
IMM GRANULOCYTES # BLD AUTO: 0.04 THOUSAND/UL (ref 0–0.2)
IMM GRANULOCYTES NFR BLD AUTO: 0 % (ref 0–2)
LYMPHOCYTES # BLD AUTO: 1.96 THOUSANDS/ΜL (ref 0.6–4.47)
LYMPHOCYTES NFR BLD AUTO: 21 % (ref 14–44)
MCH RBC QN AUTO: 30.8 PG (ref 26.8–34.3)
MCHC RBC AUTO-ENTMCNC: 32.9 G/DL (ref 31.4–37.4)
MCV RBC AUTO: 94 FL (ref 82–98)
MONOCYTES # BLD AUTO: 0.75 THOUSAND/ΜL (ref 0.17–1.22)
MONOCYTES NFR BLD AUTO: 8 % (ref 4–12)
NEUTROPHILS # BLD AUTO: 6.27 THOUSANDS/ΜL (ref 1.85–7.62)
NEUTS SEG NFR BLD AUTO: 67 % (ref 43–75)
NRBC BLD AUTO-RTO: 0 /100 WBCS
NT-PROBNP SERPL-MCNC: 430 PG/ML
PLATELET # BLD AUTO: 327 THOUSANDS/UL (ref 149–390)
PMV BLD AUTO: 10.7 FL (ref 8.9–12.7)
POTASSIUM SERPL-SCNC: 4.9 MMOL/L (ref 3.5–5.3)
RBC # BLD AUTO: 5.29 MILLION/UL (ref 3.88–5.62)
SODIUM SERPL-SCNC: 126 MMOL/L (ref 136–145)
TSH SERPL DL<=0.05 MIU/L-ACNC: 2.72 UIU/ML (ref 0.36–3.74)
VIT B12 SERPL-MCNC: 1040 PG/ML (ref 100–900)
WBC # BLD AUTO: 9.35 THOUSAND/UL (ref 4.31–10.16)

## 2018-12-20 PROCEDURE — 83880 ASSAY OF NATRIURETIC PEPTIDE: CPT

## 2018-12-20 PROCEDURE — 85025 COMPLETE CBC W/AUTO DIFF WBC: CPT

## 2018-12-20 PROCEDURE — 99214 OFFICE O/P EST MOD 30 MIN: CPT | Performed by: INTERNAL MEDICINE

## 2018-12-20 PROCEDURE — 80048 BASIC METABOLIC PNL TOTAL CA: CPT

## 2018-12-20 PROCEDURE — 36415 COLL VENOUS BLD VENIPUNCTURE: CPT

## 2018-12-20 PROCEDURE — 84443 ASSAY THYROID STIM HORMONE: CPT

## 2018-12-20 PROCEDURE — 83036 HEMOGLOBIN GLYCOSYLATED A1C: CPT

## 2018-12-20 PROCEDURE — 82607 VITAMIN B-12: CPT

## 2018-12-20 RX ORDER — ACETAMINOPHEN 500 MG
500 TABLET ORAL EVERY 6 HOURS PRN
COMMUNITY
Start: 2018-08-10 | End: 2019-08-19

## 2018-12-20 NOTE — PROGRESS NOTES
Assessment/Plan:       Diagnoses and all orders for this visit:    Type 2 diabetes mellitus without complication, with long-term current use of insulin (HCC)  -     Basic metabolic panel; Future  -     CBC and differential; Future  -     Hemoglobin A1C; Future  -     TSH, 3rd generation with Free T4 reflex; Future  -     Vitamin B12; Future    Hypertension, essential  -     Basic metabolic panel; Future  -     CBC and differential; Future  -     Hemoglobin A1C; Future  -     TSH, 3rd generation with Free T4 reflex; Future  -     Vitamin B12; Future    Alzheimer's dementia without behavioral disturbance, unspecified timing of dementia onset  -     Basic metabolic panel; Future  -     CBC and differential; Future  -     Hemoglobin A1C; Future  -     TSH, 3rd generation with Free T4 reflex; Future  -     Vitamin B12; Future    Bilateral impacted cerumen    Chronic congestive heart failure, unspecified heart failure type ([facility] )  -     NT-BNP PRO; Future    Mixed hyperlipidemia    Other orders  -     acetaminophen (TYLENOL) 500 mg tablet; Take 500 mg by mouth every 6 (six) hours as needed          Patient Instructions   I need you to review all your medicines at home to see if it car response probably tore list   Please call me after you do that  I need you to check your sugars twice a day  A checked the sugar in the morning and then check it 2 hours after dinner  Please wait to take your insulin until your assistant comes to the home so she can monitor the dose  For now, continue the same dose of insulin  Blood sugar checked today  Revisit in 2-3 weeks  Please, when you come back in, bring in all your medicines  Subjective:      Patient ID: Maggi Luis is a 80 y o  male  A 80year-old diabetic patient  Currently living along with a 3 hour a day home attendant coming in anywhere from 5-7 days a week  The attendant arrives about 9:00 a m  In leaves around noon  A diabetic    He has been uncontrolled for years and has enough dementia to find it difficult to follow instructions  Having said that, he has made it to 90 with a hemoglobin A1c of 13  His wife  in October; a dementia patient  Hypertension is treated and stable  Hyperlipidemia is treated and stable  Complications of CAD and CHF are stable with no active symptoms  Medications have all been reviewed  Although the patient says he is compliant with his medications and not certain that he actually knows what he is really taking               The following portions of the patient's history were reviewed and updated as appropriate:   He has a past medical history of Congestive heart failure (CHF) ([address] ); History of colonoscopy; and Hyperlipidemia ,   does not have any pertinent problems on file  ,   has a past surgical history that includes Colonoscopy and Coronary artery bypass graft  ,  family history includes No Known Problems in his mother  ,   reports that he has quit smoking  He has never used smokeless tobacco  He reports that he drinks alcohol  He reports that he does not use drugs  ,  has No Known Allergies     Current Outpatient Prescriptions   Medication Sig Dispense Refill    acetaminophen (TYLENOL) 500 mg tablet Take 500 mg by mouth every 6 (six) hours as needed      donepezil (ARICEPT) 10 mg tablet Take 1 tablet by mouth daily      furosemide (LASIX) 20 mg tablet Take by mouth      insulin isophane-insulin regular (HUMULIN 70/30 KWIKPEN) 100 Units/mL SUPN Inject under the skin      Insulin Syringe-Needle U-100 (B-D INS SYR ULTRAFINE  3CC/30G) 30G X 1/2" 0 3 ML MISC by Does not apply route      lisinopril (ZESTRIL) 2 5 mg tablet Take by mouth      metFORMIN (GLUMETZA) 1000 MG (MOD) 24 hr tablet Take by mouth 2 (two) times a day with meals      metoprolol succinate (TOPROL-XL) 50 mg 24 hr tablet Take by mouth      omeprazole (PriLOSEC) 10 mg delayed release capsule Take by mouth      acetaminophen-codeine (TYLENOL #3) 300-30 mg per tablet take 1 tablet by mouth every 4 hours if needed for pain  0    aspirin 81 MG tablet Take 1 tablet by mouth daily      nitroglycerin (NITROSTAT) 0 4 mg SL tablet Place under the tongue      NOVOLIN 70/30 (70-30) 100 UNIT/ML subcutaneous injection inject 30 units subcutaneously twice a day 10 mL 5    oxybutynin (DITROPAN) 5 mg tablet take 1 tablet by mouth twice a day (Patient not taking: Reported on 12/20/2018) 60 tablet 3    pravastatin (PRAVACHOL) 40 mg tablet Take by mouth       No current facility-administered medications for this visit  Review of Systems   Constitutional: Negative for chills and fever  HENT: Negative for sore throat and trouble swallowing  Eyes: Negative for pain  Respiratory: Negative for cough and shortness of breath  Cardiovascular: Negative for chest pain and palpitations  Gastrointestinal: Negative for abdominal pain, blood in stool, diarrhea, nausea and vomiting  Endocrine: Negative for cold intolerance and heat intolerance  Genitourinary: Negative for dysuria, frequency and testicular pain  Musculoskeletal: Negative for joint swelling  Allergic/Immunologic: Negative for immunocompromised state  Neurological: Negative for dizziness, syncope and headaches  Hematological: Negative for adenopathy  Does not bruise/bleed easily  Psychiatric/Behavioral: Negative for dysphoric mood  The patient is not nervous/anxious  Objective:  Vitals:    12/20/18 1030   BP: 150/86   Pulse: 89   SpO2: 96%      Physical Exam   Constitutional: No distress  An elderly male patient who appears his stated age   Cardiovascular: Normal rate and regular rhythm  Pulmonary/Chest: Effort normal  No respiratory distress  He has no wheezes  He has rales in the right lower field and the left lower field  Abdominal: Soft  Musculoskeletal: He exhibits deformity  Osteoarthrosis deformities noted   Neurological: He is alert     Psychiatric: He has a normal mood and affect

## 2018-12-20 NOTE — PATIENT INSTRUCTIONS
I need you to review all your medicines at home to see if it car response probably tore list   Please call me after you do that  I need you to check your sugars twice a day  A checked the sugar in the morning and then check it 2 hours after dinner  Please wait to take your insulin until your assistant comes to the home so she can monitor the dose  For now, continue the same dose of insulin  Blood sugar checked today  Revisit in 2-3 weeks  Please, when you come back in, bring in all your medicines

## 2018-12-20 NOTE — TELEPHONE ENCOUNTER
Dr Nicki Cuevas to the Patient's son and instructed him to pick his dad up and take him to the [facility]

## 2018-12-21 NOTE — TELEPHONE ENCOUNTER
----- Message from Vivian Hilliard MD sent at 12/20/2018  4:26 PM EST -----  Blood sugar 575 is noted    I called the patient's son in [place] and instructed him that the father was in imminent danger of death due to this result and that he should be transported to the nearest hospital   The son told me they would comply with this recommendation

## 2018-12-26 ENCOUNTER — TELEPHONE (OUTPATIENT)
Dept: INTERNAL MEDICINE CLINIC | Facility: CLINIC | Age: 83
End: 2018-12-26

## 2018-12-26 DIAGNOSIS — I10 ESSENTIAL HYPERTENSION: Primary | ICD-10-CM

## 2018-12-26 RX ORDER — FUROSEMIDE 20 MG/1
TABLET ORAL
Qty: 30 TABLET | Refills: 2 | Status: SHIPPED | OUTPATIENT
Start: 2018-12-26 | End: 2019-03-14 | Stop reason: SDUPTHER

## 2018-12-26 NOTE — TELEPHONE ENCOUNTER
PT'S HOME CARE NURSE DINORA CALLED STATING THAT ANDERS WAS IN TO SEE DR MENDEZ ON DEC 17TH AND DR Jami Higgins SAID THAT HE NEEDS TO BE ON ALL OF THESE MEDICATIONS  THEY WERE ALL   DINORA CALLED TO GET THESE ALL REFILLED   SENT TO [facility] ON [address]    ACETAMINOPHEN (TYLENOL) 500 MG    ACETAMINOPHEN (TYLENOL-CONDENE) 300 MG    ASPRIN 81 MG TABLET    DONEPEZIL(ARICEPT) 10 MG TABLET    FUROSEMIDE(LASIX) 20 MG TABLET     LISINOPRIL (ZESTRIL) 2 5 MG TABLET    METFORMIN(GLUMETZA) 1000 MG    METOPROLOL SUCCINATE(TOPROL-XL) 50 MG    NITROGLYCERIN(NITROSTAT) 0 4 MG    OMEPRAZOLE(PRILOSEC) 10 MG    OXYBUTYNIN( DITROPAN)  5 MG    PRAVASTATIN(PRAVACHOL) 40 MG TABLET    INSULIN SYRINGE- NEEDLE U-100 (B-D INS SYR ULTRAFINE  3CC/30G)     INSULIN ISOPHANE-INSULIN REGULAR (HUMULIN 70/30   KWIKPEN) 100 UNITS/ML SUPN    ANY QUESTIONS PLEASE CONTACT DINORA AT  [phone]

## 2018-12-27 DIAGNOSIS — G30.1 LATE ONSET ALZHEIMER'S DISEASE WITHOUT BEHAVIORAL DISTURBANCE (HCC): Primary | ICD-10-CM

## 2018-12-27 DIAGNOSIS — F02.80 LATE ONSET ALZHEIMER'S DISEASE WITHOUT BEHAVIORAL DISTURBANCE (HCC): Primary | ICD-10-CM

## 2018-12-28 DIAGNOSIS — G30.1 LATE ONSET ALZHEIMER'S DISEASE WITHOUT BEHAVIORAL DISTURBANCE (HCC): ICD-10-CM

## 2018-12-28 DIAGNOSIS — N40.0 BENIGN PROSTATIC HYPERPLASIA, UNSPECIFIED WHETHER LOWER URINARY TRACT SYMPTOMS PRESENT: ICD-10-CM

## 2018-12-28 DIAGNOSIS — F02.80 LATE ONSET ALZHEIMER'S DISEASE WITHOUT BEHAVIORAL DISTURBANCE (HCC): ICD-10-CM

## 2018-12-28 DIAGNOSIS — I10 ESSENTIAL HYPERTENSION: ICD-10-CM

## 2018-12-28 DIAGNOSIS — E13.8 DIABETES MELLITUS OF OTHER TYPE WITH COMPLICATION, UNSPECIFIED WHETHER LONG TERM INSULIN USE: Primary | ICD-10-CM

## 2018-12-28 RX ORDER — DONEPEZIL HYDROCHLORIDE 10 MG/1
10 TABLET, FILM COATED ORAL DAILY
Qty: 90 TABLET | Refills: 3 | Status: SHIPPED | OUTPATIENT
Start: 2018-12-28 | End: 2019-04-19 | Stop reason: SDUPTHER

## 2018-12-31 NOTE — TELEPHONE ENCOUNTER
He needs to come back in with his family and with with ever medications are in the house at this time  It seems to me that he is taking nothing at all

## 2019-01-07 ENCOUNTER — OFFICE VISIT (OUTPATIENT)
Dept: INTERNAL MEDICINE CLINIC | Facility: CLINIC | Age: 84
End: 2019-01-07
Payer: MEDICARE

## 2019-01-07 VITALS
DIASTOLIC BLOOD PRESSURE: 64 MMHG | HEART RATE: 79 BPM | WEIGHT: 168.4 LBS | BODY MASS INDEX: 25.52 KG/M2 | OXYGEN SATURATION: 96 % | HEIGHT: 68 IN | SYSTOLIC BLOOD PRESSURE: 120 MMHG

## 2019-01-07 DIAGNOSIS — I10 HYPERTENSION, ESSENTIAL: ICD-10-CM

## 2019-01-07 DIAGNOSIS — E11.9 TYPE 2 DIABETES MELLITUS WITHOUT COMPLICATION, WITH LONG-TERM CURRENT USE OF INSULIN (HCC): Primary | ICD-10-CM

## 2019-01-07 DIAGNOSIS — I50.9 CHRONIC CONGESTIVE HEART FAILURE, UNSPECIFIED HEART FAILURE TYPE (HCC): ICD-10-CM

## 2019-01-07 DIAGNOSIS — G30.9 ALZHEIMER'S DEMENTIA WITHOUT BEHAVIORAL DISTURBANCE, UNSPECIFIED TIMING OF DEMENTIA ONSET (HCC): ICD-10-CM

## 2019-01-07 DIAGNOSIS — Z79.4 TYPE 2 DIABETES MELLITUS WITHOUT COMPLICATION, WITH LONG-TERM CURRENT USE OF INSULIN (HCC): Primary | ICD-10-CM

## 2019-01-07 DIAGNOSIS — F02.80 ALZHEIMER'S DEMENTIA WITHOUT BEHAVIORAL DISTURBANCE, UNSPECIFIED TIMING OF DEMENTIA ONSET (HCC): ICD-10-CM

## 2019-01-07 PROCEDURE — 99213 OFFICE O/P EST LOW 20 MIN: CPT | Performed by: INTERNAL MEDICINE

## 2019-01-07 RX ORDER — METFORMIN HYDROCHLORIDE 500 MG/1
TABLET, EXTENDED RELEASE ORAL
Qty: 360 TABLET | Refills: 3
Start: 2019-01-07 | End: 2019-01-10 | Stop reason: SDUPTHER

## 2019-01-07 NOTE — PATIENT INSTRUCTIONS
TAKE METFORMIN 4 TABLETS EVERY DAY IN THE MORNING  INCREASE THE INSULIN AS FOLLOWS:  70 30 INSULIN 40 UNITS TWICE A DAY  CONTINUE TO CHECK THE BLOOD SUGAR TWICE A DAY, BUT GO TO 3 DAYS A WEEK  MONDAY, WEDNESDAY, AND FRIDAY WILL BE FINE    SEE ME BACK HERE IN ABOUT 1 MONTH

## 2019-01-07 NOTE — PROGRESS NOTES
Assessment/Plan:       Diagnoses and all orders for this visit:    Type 2 diabetes mellitus without complication, with long-term current use of insulin (HCC)  -     metFORMIN (GLUCOPHAGE-XR) 500 mg 24 hr tablet; 4 TABLETS EVERY MORNING    Hypertension, essential    Chronic congestive heart failure, unspecified heart failure type ([facility] )    Alzheimer's dementia without behavioral disturbance, unspecified timing of dementia onset          Patient Instructions   TAKE METFORMIN 4 TABLETS EVERY DAY IN THE MORNING  INCREASE THE INSULIN AS FOLLOWS:  70 30 INSULIN 40 UNITS TWICE A DAY  CONTINUE TO CHECK THE BLOOD SUGAR TWICE A DAY, BUT GO TO 3 DAYS A WEEK  MONDAY, WEDNESDAY, AND FRIDAY WILL BE FINE  SEE ME BACK HERE IN ABOUT 1 MONTH        Subjective:      Patient ID: Garfield Couch is a 80 y o  male  A 80year-old, functional, but beginning to slip today Alzheimer's disease  Brought back to the completely uncontrolled blood sugar  His A1c has been out of control for number of years  Most recentl 13 8  States he is using 70 30 insulin 30 units twice a day and metformin 500 mg extended release 2 tablets daily only, not for    Medication list is reviewed  He is not taking a lot of the man is he is supposedly on  These include metoprolol and lisinopril  However, is blood pressure is low enough as it is  Denies any cardiopulmonary symptoms no chest pain orthopnea PND or exertional dyspnea or pedal edema    Home blood sugar readings are anywhere between 204 100  His random blood sugar taking it 2 weeks ago was 535! There was an associated hyponatremia  Probably this is mostly pseudo hyponatremia        The following portions of the patient's history were reviewed and updated as appropriate:   He has a past medical history of Congestive heart failure (CHF) ([facility] ); History of colonoscopy; and Hyperlipidemia ,   does not have any pertinent problems on file  ,   has a past surgical history that includes Colonoscopy and Coronary artery bypass graft  ,  family history includes No Known Problems in his mother  ,   reports that he quit smoking about 46 years ago  He has a 25 00 pack-year smoking history  He has never used smokeless tobacco  He reports that he drinks alcohol  He reports that he does not use drugs  ,  has No Known Allergies     Current Outpatient Prescriptions   Medication Sig Dispense Refill    donepezil (ARICEPT) 10 mg tablet Take 1 tablet (10 mg total) by mouth daily 90 tablet 3    furosemide (LASIX) 20 mg tablet take 1 tablet by mouth once daily 30 tablet 2    oxybutynin (DITROPAN) 5 mg tablet take 1 tablet by mouth twice a day 60 tablet 3    acetaminophen (TYLENOL) 500 mg tablet Take 500 mg by mouth every 6 (six) hours as needed      aspirin 81 MG tablet Take 1 tablet by mouth daily      insulin isophane-insulin regular (HUMULIN 70/30 KWIKPEN) 100 Units/mL SUPN Inject under the skin      Insulin Syringe-Needle U-100 (B-D INS SYR ULTRAFINE  3CC/30G) 30G X 1/2" 0 3 ML MISC by Does not apply route      metFORMIN (GLUCOPHAGE-XR) 500 mg 24 hr tablet 4 TABLETS EVERY MORNING 360 tablet 3    nitroglycerin (NITROSTAT) 0 4 mg SL tablet Place under the tongue      NOVOLIN 70/30 (70-30) 100 UNIT/ML subcutaneous injection inject 30 units subcutaneously twice a day 10 mL 5     No current facility-administered medications for this visit  Review of Systems   Respiratory: Negative for cough, shortness of breath and wheezing  Cardiovascular: Negative for chest pain and leg swelling  Objective:  Vitals:    01/07/19 1828   BP: 120/64   Pulse: 79   SpO2: 96%      Physical Exam   Constitutional: He is oriented to person, place, and time  No distress  Alert patient who appears to be stated age   Cardiovascular: Normal rate  Neurological: He is alert and oriented to person, place, and time  Psychiatric: He has a normal mood and affect   Judgment normal

## 2019-01-10 DIAGNOSIS — E11.9 TYPE 2 DIABETES MELLITUS WITHOUT COMPLICATION, WITH LONG-TERM CURRENT USE OF INSULIN (HCC): ICD-10-CM

## 2019-01-10 DIAGNOSIS — Z79.4 TYPE 2 DIABETES MELLITUS WITH STAGE 4 CHRONIC KIDNEY DISEASE, WITH LONG-TERM CURRENT USE OF INSULIN (HCC): ICD-10-CM

## 2019-01-10 DIAGNOSIS — Z79.4 TYPE 2 DIABETES MELLITUS WITHOUT COMPLICATION, WITH LONG-TERM CURRENT USE OF INSULIN (HCC): ICD-10-CM

## 2019-01-10 DIAGNOSIS — N18.4 TYPE 2 DIABETES MELLITUS WITH STAGE 4 CHRONIC KIDNEY DISEASE, WITH LONG-TERM CURRENT USE OF INSULIN (HCC): ICD-10-CM

## 2019-01-10 DIAGNOSIS — E11.22 TYPE 2 DIABETES MELLITUS WITH STAGE 4 CHRONIC KIDNEY DISEASE, WITH LONG-TERM CURRENT USE OF INSULIN (HCC): ICD-10-CM

## 2019-01-10 RX ORDER — METFORMIN HYDROCHLORIDE 500 MG/1
TABLET, EXTENDED RELEASE ORAL
Qty: 360 TABLET | Refills: 0 | Status: SHIPPED | OUTPATIENT
Start: 2019-01-10 | End: 2019-03-14 | Stop reason: SDUPTHER

## 2019-01-10 NOTE — TELEPHONE ENCOUNTER
She brought Patient into see   Monday night and he was going to order patient's insulin and something else   she didn't know what & said nothing was sent to pharmacy  Aditya Manifold

## 2019-02-19 ENCOUNTER — TELEPHONE (OUTPATIENT)
Dept: INTERNAL MEDICINE CLINIC | Facility: CLINIC | Age: 84
End: 2019-02-19

## 2019-03-14 DIAGNOSIS — Z79.4 TYPE 2 DIABETES MELLITUS WITHOUT COMPLICATION, WITH LONG-TERM CURRENT USE OF INSULIN (HCC): ICD-10-CM

## 2019-03-14 DIAGNOSIS — I10 ESSENTIAL HYPERTENSION: ICD-10-CM

## 2019-03-14 DIAGNOSIS — E11.9 TYPE 2 DIABETES MELLITUS WITHOUT COMPLICATION, WITH LONG-TERM CURRENT USE OF INSULIN (HCC): ICD-10-CM

## 2019-03-14 RX ORDER — FUROSEMIDE 20 MG/1
20 TABLET ORAL DAILY
Qty: 30 TABLET | Refills: 2 | Status: SHIPPED | OUTPATIENT
Start: 2019-03-14 | End: 2019-06-07 | Stop reason: SDUPTHER

## 2019-03-14 RX ORDER — METFORMIN HYDROCHLORIDE 500 MG/1
TABLET, EXTENDED RELEASE ORAL
Qty: 360 TABLET | Refills: 0 | Status: SHIPPED | OUTPATIENT
Start: 2019-03-14 | End: 2019-05-09 | Stop reason: SDUPTHER

## 2019-03-18 NOTE — TELEPHONE ENCOUNTER
These medications need to go to [address]  The pt is now using blister packing and had all his scripts sent there   Please send over the METFORMIN 500mg   FUROSEMIDE 20mg

## 2019-03-19 RX ORDER — FUROSEMIDE 20 MG/1
20 TABLET ORAL DAILY
Qty: 30 TABLET | Refills: 2 | OUTPATIENT
Start: 2019-03-19

## 2019-03-19 RX ORDER — METFORMIN HYDROCHLORIDE 500 MG/1
TABLET, EXTENDED RELEASE ORAL
Qty: 360 TABLET | Refills: 1 | OUTPATIENT
Start: 2019-03-19

## 2019-03-22 DIAGNOSIS — N18.4 TYPE 2 DIABETES MELLITUS WITH STAGE 4 CHRONIC KIDNEY DISEASE, WITH LONG-TERM CURRENT USE OF INSULIN (HCC): Primary | ICD-10-CM

## 2019-03-22 DIAGNOSIS — Z79.4 TYPE 2 DIABETES MELLITUS WITH STAGE 4 CHRONIC KIDNEY DISEASE, WITH LONG-TERM CURRENT USE OF INSULIN (HCC): Primary | ICD-10-CM

## 2019-03-22 DIAGNOSIS — E11.22 TYPE 2 DIABETES MELLITUS WITH STAGE 4 CHRONIC KIDNEY DISEASE, WITH LONG-TERM CURRENT USE OF INSULIN (HCC): Primary | ICD-10-CM

## 2019-03-22 RX ORDER — PEN NEEDLE, DIABETIC 29 G X1/2"
NEEDLE, DISPOSABLE MISCELLANEOUS
Qty: 100 EACH | Refills: 3 | Status: SHIPPED | OUTPATIENT
Start: 2019-03-22 | End: 2019-08-19

## 2019-03-25 ENCOUNTER — APPOINTMENT (OUTPATIENT)
Dept: LAB | Facility: CLINIC | Age: 84
End: 2019-03-25
Payer: MEDICARE

## 2019-03-25 ENCOUNTER — OFFICE VISIT (OUTPATIENT)
Dept: INTERNAL MEDICINE CLINIC | Facility: CLINIC | Age: 84
End: 2019-03-25
Payer: MEDICARE

## 2019-03-25 VITALS
WEIGHT: 168 LBS | OXYGEN SATURATION: 96 % | SYSTOLIC BLOOD PRESSURE: 140 MMHG | HEART RATE: 75 BPM | DIASTOLIC BLOOD PRESSURE: 70 MMHG | BODY MASS INDEX: 25.54 KG/M2

## 2019-03-25 DIAGNOSIS — G30.9 ALZHEIMER'S DEMENTIA WITHOUT BEHAVIORAL DISTURBANCE, UNSPECIFIED TIMING OF DEMENTIA ONSET (HCC): Primary | ICD-10-CM

## 2019-03-25 DIAGNOSIS — N18.4 TYPE 2 DIABETES MELLITUS WITH STAGE 4 CHRONIC KIDNEY DISEASE, WITH LONG-TERM CURRENT USE OF INSULIN (HCC): ICD-10-CM

## 2019-03-25 DIAGNOSIS — F02.80 ALZHEIMER'S DEMENTIA WITHOUT BEHAVIORAL DISTURBANCE, UNSPECIFIED TIMING OF DEMENTIA ONSET (HCC): Primary | ICD-10-CM

## 2019-03-25 DIAGNOSIS — Z79.4 TYPE 2 DIABETES MELLITUS WITHOUT COMPLICATION, WITH LONG-TERM CURRENT USE OF INSULIN (HCC): ICD-10-CM

## 2019-03-25 DIAGNOSIS — E11.9 TYPE 2 DIABETES MELLITUS WITHOUT COMPLICATION, WITH LONG-TERM CURRENT USE OF INSULIN (HCC): ICD-10-CM

## 2019-03-25 DIAGNOSIS — E11.22 TYPE 2 DIABETES MELLITUS WITH STAGE 4 CHRONIC KIDNEY DISEASE, WITH LONG-TERM CURRENT USE OF INSULIN (HCC): ICD-10-CM

## 2019-03-25 DIAGNOSIS — G30.9 ALZHEIMER'S DEMENTIA WITHOUT BEHAVIORAL DISTURBANCE, UNSPECIFIED TIMING OF DEMENTIA ONSET (HCC): ICD-10-CM

## 2019-03-25 DIAGNOSIS — Z79.4 TYPE 2 DIABETES MELLITUS WITH STAGE 4 CHRONIC KIDNEY DISEASE, WITH LONG-TERM CURRENT USE OF INSULIN (HCC): ICD-10-CM

## 2019-03-25 DIAGNOSIS — F02.80 ALZHEIMER'S DEMENTIA WITHOUT BEHAVIORAL DISTURBANCE, UNSPECIFIED TIMING OF DEMENTIA ONSET (HCC): ICD-10-CM

## 2019-03-25 PROBLEM — I44.2 CHB (COMPLETE HEART BLOCK) (HCC): Status: ACTIVE | Noted: 2019-03-25

## 2019-03-25 LAB
ANION GAP SERPL CALCULATED.3IONS-SCNC: 4 MMOL/L (ref 4–13)
BUN SERPL-MCNC: 17 MG/DL (ref 5–25)
CALCIUM SERPL-MCNC: 9 MG/DL (ref 8.3–10.1)
CHLORIDE SERPL-SCNC: 100 MMOL/L (ref 100–108)
CO2 SERPL-SCNC: 30 MMOL/L (ref 21–32)
CREAT SERPL-MCNC: 1.09 MG/DL (ref 0.6–1.3)
EST. AVERAGE GLUCOSE BLD GHB EST-MCNC: 174 MG/DL
GFR SERPL CREATININE-BSD FRML MDRD: 59 ML/MIN/1.73SQ M
GLUCOSE SERPL-MCNC: 209 MG/DL (ref 65–140)
HBA1C MFR BLD: 7.7 % (ref 4.2–6.3)
POTASSIUM SERPL-SCNC: 4.6 MMOL/L (ref 3.5–5.3)
SODIUM SERPL-SCNC: 134 MMOL/L (ref 136–145)
TSH SERPL DL<=0.05 MIU/L-ACNC: 2.47 UIU/ML (ref 0.36–3.74)

## 2019-03-25 PROCEDURE — 80048 BASIC METABOLIC PNL TOTAL CA: CPT

## 2019-03-25 PROCEDURE — 84443 ASSAY THYROID STIM HORMONE: CPT

## 2019-03-25 PROCEDURE — 83036 HEMOGLOBIN GLYCOSYLATED A1C: CPT

## 2019-03-25 PROCEDURE — 36415 COLL VENOUS BLD VENIPUNCTURE: CPT

## 2019-03-25 PROCEDURE — 99213 OFFICE O/P EST LOW 20 MIN: CPT | Performed by: INTERNAL MEDICINE

## 2019-03-25 RX ORDER — MELATONIN
1000 DAILY
COMMUNITY

## 2019-03-25 RX ORDER — PRASTERONE (DHEA) 50 MG
CAPSULE ORAL
COMMUNITY
End: 2019-08-19

## 2019-03-25 NOTE — PROGRESS NOTES
Assessment/Plan:       Diagnoses and all orders for this visit:    Alzheimer's dementia without behavioral disturbance, unspecified timing of dementia onset  -     Basic metabolic panel; Future  -     Hemoglobin A1C; Future  -     TSH, 3rd generation with Free T4 reflex; Future    Type 2 diabetes mellitus with stage 4 chronic kidney disease, with long-term current use of insulin (HCC)  -     Basic metabolic panel; Future  -     Hemoglobin A1C; Future  -     TSH, 3rd generation with Free T4 reflex; Future    Other orders  -     cholecalciferol (VITAMIN D3) 1,000 units tablet; Take 1,000 Units by mouth daily  -     ascorbic Acid (VITAMIN C) 500 MG CPCR; Take 500 mg by mouth daily  -     co-enzyme Q-10 50 MG capsule; Take 50 mg by mouth daily  -     DHEA 50 MG CAPS; Take by mouth          Patient Instructions   Recheck A1c today in follow-up at three-month intervals        Subjective:      Patient ID: Adenike Lew is a 80 y o  male  A 80year-old, functional, but beginning to slip today Alzheimer's disease  2 months ago with completely uncontrolled blood sugars  Hemoglobin A1c was up to 13 8  Medication list was reviewed that time and it turned out he was not taking a lot of meds that we thought he was on  However, he seems to be doing quite well without them  Denies any cardiopulmonary symptoms no chest pain orthopnea PND or exertional dyspnea or pedal edema          The following portions of the patient's history were reviewed and updated as appropriate:   He has a past medical history of Congestive heart failure (CHF) ([address] ), History of colonoscopy, and Hyperlipidemia ,  does not have any pertinent problems on file  ,   has a past surgical history that includes Colonoscopy and Coronary artery bypass graft  ,  family history includes No Known Problems in his mother  ,   reports that he quit smoking about 46 years ago  He has a 25 00 pack-year smoking history   He has never used smokeless tobacco  He reports that he drinks alcohol  He reports that he does not use drugs  ,  has No Known Allergies     Current Outpatient Medications   Medication Sig Dispense Refill    acetaminophen (TYLENOL) 500 mg tablet Take 500 mg by mouth every 6 (six) hours as needed      ascorbic Acid (VITAMIN C) 500 MG CPCR Take 500 mg by mouth daily      BD INSULIN SYRINGE U/F 30G X 1/2" 0 3 ML MISC use twice a day 100 each 3    cholecalciferol (VITAMIN D3) 1,000 units tablet Take 1,000 Units by mouth daily      co-enzyme Q-10 50 MG capsule Take 50 mg by mouth daily      DHEA 50 MG CAPS Take by mouth      donepezil (ARICEPT) 10 mg tablet Take 1 tablet (10 mg total) by mouth daily 90 tablet 3    furosemide (LASIX) 20 mg tablet Take 1 tablet (20 mg total) by mouth daily 30 tablet 2    insulin isophane-insulin regular (HUMULIN 70/30 KWIKPEN) 100 Units/mL SUPN Inject under the skin      metFORMIN (GLUCOPHAGE-XR) 500 mg 24 hr tablet 4 TABLETS EVERY MORNING 360 tablet 0    nitroglycerin (NITROSTAT) 0 4 mg SL tablet Place under the tongue      NOVOLIN 70/30 (70-30) 100 UNIT/ML subcutaneous injection inject 30 units subcutaneously twice a day 10 mL 5    oxybutynin (DITROPAN) 5 mg tablet take 1 tablet by mouth twice a day 60 tablet 3    aspirin 81 MG tablet Take 1 tablet by mouth daily       No current facility-administered medications for this visit  Review of Systems   Respiratory: Negative for cough, shortness of breath and wheezing  Cardiovascular: Negative for chest pain and leg swelling  Objective:  Vitals:    03/25/19 1435   BP: 140/70   Pulse: 75   SpO2: 96%      Physical Exam   Constitutional: He is oriented to person, place, and time  No distress  Alert patient who appears to be stated age   Cardiovascular: Normal rate  Neurological: He is alert and oriented to person, place, and time  Psychiatric: He has a normal mood and affect   Judgment normal

## 2019-03-26 ENCOUNTER — TELEPHONE (OUTPATIENT)
Dept: INTERNAL MEDICINE CLINIC | Facility: CLINIC | Age: 84
End: 2019-03-26

## 2019-03-26 DIAGNOSIS — N18.4 TYPE 2 DIABETES MELLITUS WITH STAGE 4 CHRONIC KIDNEY DISEASE, WITH LONG-TERM CURRENT USE OF INSULIN (HCC): ICD-10-CM

## 2019-03-26 DIAGNOSIS — E11.22 TYPE 2 DIABETES MELLITUS WITH STAGE 4 CHRONIC KIDNEY DISEASE, WITH LONG-TERM CURRENT USE OF INSULIN (HCC): ICD-10-CM

## 2019-03-26 DIAGNOSIS — Z79.4 TYPE 2 DIABETES MELLITUS WITH STAGE 4 CHRONIC KIDNEY DISEASE, WITH LONG-TERM CURRENT USE OF INSULIN (HCC): ICD-10-CM

## 2019-03-26 RX ORDER — HUMAN INSULIN 100 [USP'U]/ML
INJECTION, SUSPENSION SUBCUTANEOUS
Qty: 10 ML | Refills: 5 | Status: SHIPPED | OUTPATIENT
Start: 2019-03-26 | End: 2019-08-02 | Stop reason: SDUPTHER

## 2019-03-26 RX ORDER — METFORMIN HYDROCHLORIDE 500 MG/1
TABLET, EXTENDED RELEASE ORAL
Qty: 360 TABLET | Refills: 0 | Status: SHIPPED | OUTPATIENT
Start: 2019-03-26 | End: 2019-05-08 | Stop reason: CLARIF

## 2019-03-26 NOTE — TELEPHONE ENCOUNTER
----- Message from Arletha Barthel, MD sent at 3/26/2019  8:27 AM EDT -----  Please call the patient regarding his  result  Diabetic control is a lot better  Hemoglobin A1c down from 13 to 7 7    Keep doing exactly what he is doing

## 2019-03-26 NOTE — TELEPHONE ENCOUNTER
LM for Nan Due to call the office back re: Tahira Covarrubias his dad  As per Dr Rollo Sever  This is a non emergent call

## 2019-04-19 DIAGNOSIS — G30.1 LATE ONSET ALZHEIMER'S DISEASE WITHOUT BEHAVIORAL DISTURBANCE (HCC): ICD-10-CM

## 2019-04-19 DIAGNOSIS — F02.80 LATE ONSET ALZHEIMER'S DISEASE WITHOUT BEHAVIORAL DISTURBANCE (HCC): ICD-10-CM

## 2019-04-19 RX ORDER — DONEPEZIL HYDROCHLORIDE 10 MG/1
TABLET, FILM COATED ORAL
Qty: 90 TABLET | Refills: 3 | Status: SHIPPED | OUTPATIENT
Start: 2019-04-19 | End: 2019-08-19

## 2019-04-25 ENCOUNTER — TELEPHONE (OUTPATIENT)
Dept: INTERNAL MEDICINE CLINIC | Facility: CLINIC | Age: 84
End: 2019-04-25

## 2019-04-30 ENCOUNTER — TELEPHONE (OUTPATIENT)
Dept: INTERNAL MEDICINE CLINIC | Facility: CLINIC | Age: 84
End: 2019-04-30

## 2019-05-08 ENCOUNTER — OFFICE VISIT (OUTPATIENT)
Dept: FAMILY MEDICINE CLINIC | Facility: CLINIC | Age: 84
End: 2019-05-08
Payer: MEDICARE

## 2019-05-08 VITALS
OXYGEN SATURATION: 97 % | WEIGHT: 160 LBS | SYSTOLIC BLOOD PRESSURE: 134 MMHG | HEART RATE: 82 BPM | DIASTOLIC BLOOD PRESSURE: 70 MMHG | BODY MASS INDEX: 24.25 KG/M2 | HEIGHT: 68 IN

## 2019-05-08 DIAGNOSIS — Z79.4 TYPE 2 DIABETES MELLITUS WITH STAGE 4 CHRONIC KIDNEY DISEASE, WITH LONG-TERM CURRENT USE OF INSULIN (HCC): Primary | ICD-10-CM

## 2019-05-08 DIAGNOSIS — E78.2 MIXED HYPERLIPIDEMIA: ICD-10-CM

## 2019-05-08 DIAGNOSIS — G30.9 ALZHEIMER'S DEMENTIA WITHOUT BEHAVIORAL DISTURBANCE, UNSPECIFIED TIMING OF DEMENTIA ONSET (HCC): ICD-10-CM

## 2019-05-08 DIAGNOSIS — I44.2 CHB (COMPLETE HEART BLOCK) (HCC): ICD-10-CM

## 2019-05-08 DIAGNOSIS — E11.22 TYPE 2 DIABETES MELLITUS WITH STAGE 4 CHRONIC KIDNEY DISEASE, WITH LONG-TERM CURRENT USE OF INSULIN (HCC): Primary | ICD-10-CM

## 2019-05-08 DIAGNOSIS — F02.80 ALZHEIMER'S DEMENTIA WITHOUT BEHAVIORAL DISTURBANCE, UNSPECIFIED TIMING OF DEMENTIA ONSET (HCC): ICD-10-CM

## 2019-05-08 DIAGNOSIS — I50.9 CHRONIC CONGESTIVE HEART FAILURE, UNSPECIFIED HEART FAILURE TYPE (HCC): ICD-10-CM

## 2019-05-08 DIAGNOSIS — N18.4 TYPE 2 DIABETES MELLITUS WITH STAGE 4 CHRONIC KIDNEY DISEASE, WITH LONG-TERM CURRENT USE OF INSULIN (HCC): Primary | ICD-10-CM

## 2019-05-08 PROCEDURE — 99215 OFFICE O/P EST HI 40 MIN: CPT | Performed by: FAMILY MEDICINE

## 2019-05-08 RX ORDER — PEN NEEDLE, DIABETIC 29 G X1/2"
NEEDLE, DISPOSABLE MISCELLANEOUS 2 TIMES DAILY
Refills: 0 | COMMUNITY
Start: 2019-03-27 | End: 2020-04-28 | Stop reason: SDUPTHER

## 2019-05-09 DIAGNOSIS — E11.9 TYPE 2 DIABETES MELLITUS WITHOUT COMPLICATION, WITH LONG-TERM CURRENT USE OF INSULIN (HCC): ICD-10-CM

## 2019-05-09 DIAGNOSIS — Z79.4 TYPE 2 DIABETES MELLITUS WITHOUT COMPLICATION, WITH LONG-TERM CURRENT USE OF INSULIN (HCC): ICD-10-CM

## 2019-05-09 RX ORDER — METFORMIN HYDROCHLORIDE 500 MG/1
TABLET, EXTENDED RELEASE ORAL
Qty: 360 TABLET | Refills: 0 | Status: SHIPPED | OUTPATIENT
Start: 2019-05-09 | End: 2019-10-22 | Stop reason: SDUPTHER

## 2019-05-10 ENCOUNTER — APPOINTMENT (OUTPATIENT)
Dept: LAB | Facility: CLINIC | Age: 84
End: 2019-05-10
Payer: MEDICARE

## 2019-05-10 DIAGNOSIS — Z79.4 TYPE 2 DIABETES MELLITUS WITH STAGE 4 CHRONIC KIDNEY DISEASE, WITH LONG-TERM CURRENT USE OF INSULIN (HCC): ICD-10-CM

## 2019-05-10 DIAGNOSIS — E11.22 TYPE 2 DIABETES MELLITUS WITH STAGE 4 CHRONIC KIDNEY DISEASE, WITH LONG-TERM CURRENT USE OF INSULIN (HCC): ICD-10-CM

## 2019-05-10 DIAGNOSIS — E78.2 MIXED HYPERLIPIDEMIA: ICD-10-CM

## 2019-05-10 DIAGNOSIS — N18.4 TYPE 2 DIABETES MELLITUS WITH STAGE 4 CHRONIC KIDNEY DISEASE, WITH LONG-TERM CURRENT USE OF INSULIN (HCC): ICD-10-CM

## 2019-05-10 DIAGNOSIS — I50.9 CHRONIC CONGESTIVE HEART FAILURE, UNSPECIFIED HEART FAILURE TYPE (HCC): ICD-10-CM

## 2019-05-10 LAB
ALBUMIN SERPL BCP-MCNC: 3.5 G/DL (ref 3.5–5)
ALP SERPL-CCNC: 49 U/L (ref 46–116)
ALT SERPL W P-5'-P-CCNC: 24 U/L (ref 12–78)
ANION GAP SERPL CALCULATED.3IONS-SCNC: 5 MMOL/L (ref 4–13)
AST SERPL W P-5'-P-CCNC: 14 U/L (ref 5–45)
BASOPHILS # BLD AUTO: 0.06 THOUSANDS/ΜL (ref 0–0.1)
BASOPHILS NFR BLD AUTO: 1 % (ref 0–1)
BILIRUB SERPL-MCNC: 0.47 MG/DL (ref 0.2–1)
BUN SERPL-MCNC: 12 MG/DL (ref 5–25)
CALCIUM SERPL-MCNC: 8.3 MG/DL (ref 8.3–10.1)
CHLORIDE SERPL-SCNC: 96 MMOL/L (ref 100–108)
CHOLEST SERPL-MCNC: 170 MG/DL (ref 50–200)
CO2 SERPL-SCNC: 29 MMOL/L (ref 21–32)
CREAT SERPL-MCNC: 1.1 MG/DL (ref 0.6–1.3)
CREAT UR-MCNC: 125 MG/DL
EOSINOPHIL # BLD AUTO: 0.54 THOUSAND/ΜL (ref 0–0.61)
EOSINOPHIL NFR BLD AUTO: 6 % (ref 0–6)
ERYTHROCYTE [DISTWIDTH] IN BLOOD BY AUTOMATED COUNT: 13.3 % (ref 11.6–15.1)
EST. AVERAGE GLUCOSE BLD GHB EST-MCNC: 154 MG/DL
GFR SERPL CREATININE-BSD FRML MDRD: 59 ML/MIN/1.73SQ M
GLUCOSE P FAST SERPL-MCNC: 108 MG/DL (ref 65–99)
HBA1C MFR BLD: 7 % (ref 4.2–6.3)
HCT VFR BLD AUTO: 45.6 % (ref 36.5–49.3)
HDLC SERPL-MCNC: 48 MG/DL (ref 40–60)
HGB BLD-MCNC: 15.1 G/DL (ref 12–17)
IMM GRANULOCYTES # BLD AUTO: 0.04 THOUSAND/UL (ref 0–0.2)
IMM GRANULOCYTES NFR BLD AUTO: 1 % (ref 0–2)
LDLC SERPL CALC-MCNC: 93 MG/DL (ref 0–100)
LYMPHOCYTES # BLD AUTO: 1.97 THOUSANDS/ΜL (ref 0.6–4.47)
LYMPHOCYTES NFR BLD AUTO: 23 % (ref 14–44)
MCH RBC QN AUTO: 31.5 PG (ref 26.8–34.3)
MCHC RBC AUTO-ENTMCNC: 33.1 G/DL (ref 31.4–37.4)
MCV RBC AUTO: 95 FL (ref 82–98)
MICROALBUMIN UR-MCNC: 31.1 MG/L (ref 0–20)
MICROALBUMIN/CREAT 24H UR: 25 MG/G CREATININE (ref 0–30)
MONOCYTES # BLD AUTO: 0.93 THOUSAND/ΜL (ref 0.17–1.22)
MONOCYTES NFR BLD AUTO: 11 % (ref 4–12)
NEUTROPHILS # BLD AUTO: 5.22 THOUSANDS/ΜL (ref 1.85–7.62)
NEUTS SEG NFR BLD AUTO: 58 % (ref 43–75)
NONHDLC SERPL-MCNC: 122 MG/DL
NRBC BLD AUTO-RTO: 0 /100 WBCS
NT-PROBNP SERPL-MCNC: 346 PG/ML
PLATELET # BLD AUTO: 271 THOUSANDS/UL (ref 149–390)
PMV BLD AUTO: 9.5 FL (ref 8.9–12.7)
POTASSIUM SERPL-SCNC: 4.8 MMOL/L (ref 3.5–5.3)
PROT SERPL-MCNC: 7.4 G/DL (ref 6.4–8.2)
RBC # BLD AUTO: 4.8 MILLION/UL (ref 3.88–5.62)
SODIUM SERPL-SCNC: 130 MMOL/L (ref 136–145)
TRIGL SERPL-MCNC: 143 MG/DL
TSH SERPL DL<=0.05 MIU/L-ACNC: 2.94 UIU/ML (ref 0.36–3.74)
WBC # BLD AUTO: 8.76 THOUSAND/UL (ref 4.31–10.16)

## 2019-05-10 PROCEDURE — 36415 COLL VENOUS BLD VENIPUNCTURE: CPT

## 2019-05-10 PROCEDURE — 82570 ASSAY OF URINE CREATININE: CPT

## 2019-05-10 PROCEDURE — 85025 COMPLETE CBC W/AUTO DIFF WBC: CPT

## 2019-05-10 PROCEDURE — 84443 ASSAY THYROID STIM HORMONE: CPT

## 2019-05-10 PROCEDURE — 80053 COMPREHEN METABOLIC PANEL: CPT

## 2019-05-10 PROCEDURE — 82043 UR ALBUMIN QUANTITATIVE: CPT

## 2019-05-10 PROCEDURE — 83880 ASSAY OF NATRIURETIC PEPTIDE: CPT

## 2019-05-10 PROCEDURE — 83036 HEMOGLOBIN GLYCOSYLATED A1C: CPT

## 2019-05-10 PROCEDURE — 80061 LIPID PANEL: CPT

## 2019-05-28 ENCOUNTER — OFFICE VISIT (OUTPATIENT)
Dept: FAMILY MEDICINE CLINIC | Facility: CLINIC | Age: 84
End: 2019-05-28
Payer: MEDICARE

## 2019-05-28 VITALS
HEART RATE: 86 BPM | WEIGHT: 178 LBS | TEMPERATURE: 97.9 F | OXYGEN SATURATION: 95 % | BODY MASS INDEX: 26.98 KG/M2 | DIASTOLIC BLOOD PRESSURE: 68 MMHG | HEIGHT: 68 IN | SYSTOLIC BLOOD PRESSURE: 138 MMHG

## 2019-05-28 DIAGNOSIS — I10 HYPERTENSION, ESSENTIAL: ICD-10-CM

## 2019-05-28 DIAGNOSIS — E11.22 TYPE 2 DIABETES MELLITUS WITH STAGE 4 CHRONIC KIDNEY DISEASE, WITH LONG-TERM CURRENT USE OF INSULIN (HCC): Primary | ICD-10-CM

## 2019-05-28 DIAGNOSIS — F02.80 ALZHEIMER'S DEMENTIA WITHOUT BEHAVIORAL DISTURBANCE, UNSPECIFIED TIMING OF DEMENTIA ONSET (HCC): ICD-10-CM

## 2019-05-28 DIAGNOSIS — Z00.00 MEDICARE ANNUAL WELLNESS VISIT, SUBSEQUENT: ICD-10-CM

## 2019-05-28 DIAGNOSIS — N18.4 TYPE 2 DIABETES MELLITUS WITH STAGE 4 CHRONIC KIDNEY DISEASE, WITH LONG-TERM CURRENT USE OF INSULIN (HCC): Primary | ICD-10-CM

## 2019-05-28 DIAGNOSIS — E78.2 MIXED HYPERLIPIDEMIA: ICD-10-CM

## 2019-05-28 DIAGNOSIS — Z79.4 TYPE 2 DIABETES MELLITUS WITH STAGE 4 CHRONIC KIDNEY DISEASE, WITH LONG-TERM CURRENT USE OF INSULIN (HCC): Primary | ICD-10-CM

## 2019-05-28 DIAGNOSIS — G30.9 ALZHEIMER'S DEMENTIA WITHOUT BEHAVIORAL DISTURBANCE, UNSPECIFIED TIMING OF DEMENTIA ONSET (HCC): ICD-10-CM

## 2019-05-28 PROCEDURE — G0439 PPPS, SUBSEQ VISIT: HCPCS | Performed by: FAMILY MEDICINE

## 2019-05-28 PROCEDURE — 99214 OFFICE O/P EST MOD 30 MIN: CPT | Performed by: FAMILY MEDICINE

## 2019-06-06 DIAGNOSIS — I10 ESSENTIAL HYPERTENSION: ICD-10-CM

## 2019-06-06 RX ORDER — FUROSEMIDE 20 MG/1
TABLET ORAL
Qty: 30 TABLET | Refills: 2 | OUTPATIENT
Start: 2019-06-06

## 2019-06-07 DIAGNOSIS — I10 ESSENTIAL HYPERTENSION: ICD-10-CM

## 2019-06-07 DIAGNOSIS — N40.0 BENIGN PROSTATIC HYPERPLASIA, UNSPECIFIED WHETHER LOWER URINARY TRACT SYMPTOMS PRESENT: ICD-10-CM

## 2019-06-07 RX ORDER — OXYBUTYNIN CHLORIDE 5 MG/1
TABLET ORAL
Qty: 60 TABLET | Refills: 3 | OUTPATIENT
Start: 2019-06-07

## 2019-06-10 RX ORDER — FUROSEMIDE 20 MG/1
20 TABLET ORAL DAILY
Qty: 30 TABLET | Refills: 2 | Status: SHIPPED | OUTPATIENT
Start: 2019-06-10 | End: 2019-07-26 | Stop reason: SDUPTHER

## 2019-06-10 RX ORDER — OXYBUTYNIN CHLORIDE 5 MG/1
5 TABLET ORAL 2 TIMES DAILY
Qty: 60 TABLET | Refills: 3 | Status: SHIPPED | OUTPATIENT
Start: 2019-06-10 | End: 2019-06-28 | Stop reason: SDUPTHER

## 2019-06-28 ENCOUNTER — TELEPHONE (OUTPATIENT)
Dept: INTERNAL MEDICINE CLINIC | Facility: CLINIC | Age: 84
End: 2019-06-28

## 2019-06-28 DIAGNOSIS — N40.0 BENIGN PROSTATIC HYPERPLASIA, UNSPECIFIED WHETHER LOWER URINARY TRACT SYMPTOMS PRESENT: ICD-10-CM

## 2019-06-28 RX ORDER — OXYBUTYNIN CHLORIDE 5 MG/1
TABLET ORAL
Qty: 60 TABLET | Refills: 0 | Status: SHIPPED | OUTPATIENT
Start: 2019-06-28 | End: 2019-07-26 | Stop reason: SDUPTHER

## 2019-06-28 NOTE — TELEPHONE ENCOUNTER
I called and s/w pt regarding the fact that he is seeing 2 PCPs  He is seeing Dr Jennifer Guzman and [facility]  Pt did tell me that he wants to continue seeing Dr Jennifer Guzman  I did speak with Leela at [facility] and she stated that his caregiver, Marilin Cantu, called to switch and that the pt wanted to see him  The pt does have dementia and Alzheimer's  I also spoke with pts son, Elisabet Velázquez, and he is faxing over [facility] papers for pt because Elisabet Velázquez does have the POA and wants all changes to go thru him  Please have Dr Jennifer Guzman discuss this with pt on Wed 7/3 appt

## 2019-07-02 NOTE — TELEPHONE ENCOUNTER
Spoke with Danya Fong, said she called Itz's son Miquel Salcido and waiting for a call back    Patient only wants to see Dr Daisy Nelson

## 2019-07-02 NOTE — TELEPHONE ENCOUNTER
I would prefer that we called her caregiver is ingest say that he should be switched over to the other physician    Okay to cancel   The visit for tomorrow

## 2019-07-10 ENCOUNTER — TELEPHONE (OUTPATIENT)
Dept: INTERNAL MEDICINE CLINIC | Facility: CLINIC | Age: 84
End: 2019-07-10

## 2019-07-10 NOTE — TELEPHONE ENCOUNTER
Becca Huber from [facility] called about this patient  He said he sent a DSO request to get an ankle brace wanted to know if we received the fax  Pt last seen 03/25/19- could be this a fake pharmacy?     Phone [phone]    [phone]    Pt call [facility]-[phone]

## 2019-07-15 NOTE — TELEPHONE ENCOUNTER
Nilam Muñiz from [facility] called back in regards to the ankle brace, informed him we will not sign order as this was not requested by the patient ENDER

## 2019-07-26 DIAGNOSIS — N40.0 BENIGN PROSTATIC HYPERPLASIA, UNSPECIFIED WHETHER LOWER URINARY TRACT SYMPTOMS PRESENT: ICD-10-CM

## 2019-07-26 DIAGNOSIS — I10 ESSENTIAL HYPERTENSION: ICD-10-CM

## 2019-07-26 RX ORDER — FUROSEMIDE 20 MG/1
TABLET ORAL
Qty: 30 TABLET | Refills: 2 | Status: SHIPPED | OUTPATIENT
Start: 2019-07-26 | End: 2019-10-09 | Stop reason: SDUPTHER

## 2019-07-26 RX ORDER — OXYBUTYNIN CHLORIDE 5 MG/1
TABLET ORAL
Qty: 60 TABLET | Refills: 0 | Status: SHIPPED | OUTPATIENT
Start: 2019-07-26 | End: 2019-08-29 | Stop reason: SDUPTHER

## 2019-08-02 DIAGNOSIS — E11.22 TYPE 2 DIABETES MELLITUS WITH STAGE 4 CHRONIC KIDNEY DISEASE, WITH LONG-TERM CURRENT USE OF INSULIN (HCC): ICD-10-CM

## 2019-08-02 DIAGNOSIS — N18.4 TYPE 2 DIABETES MELLITUS WITH STAGE 4 CHRONIC KIDNEY DISEASE, WITH LONG-TERM CURRENT USE OF INSULIN (HCC): ICD-10-CM

## 2019-08-02 DIAGNOSIS — Z79.4 TYPE 2 DIABETES MELLITUS WITH STAGE 4 CHRONIC KIDNEY DISEASE, WITH LONG-TERM CURRENT USE OF INSULIN (HCC): ICD-10-CM

## 2019-08-02 RX ORDER — HUMAN INSULIN 100 [USP'U]/ML
INJECTION, SUSPENSION SUBCUTANEOUS
Qty: 10 ML | Refills: 5 | Status: ON HOLD | OUTPATIENT
Start: 2019-08-02 | End: 2019-08-27 | Stop reason: SDUPTHER

## 2019-08-19 ENCOUNTER — APPOINTMENT (INPATIENT)
Dept: ULTRASOUND IMAGING | Facility: HOSPITAL | Age: 84
DRG: 617 | End: 2019-08-19
Payer: MEDICARE

## 2019-08-19 ENCOUNTER — APPOINTMENT (EMERGENCY)
Dept: RADIOLOGY | Facility: HOSPITAL | Age: 84
DRG: 617 | End: 2019-08-19
Payer: MEDICARE

## 2019-08-19 ENCOUNTER — HOSPITAL ENCOUNTER (INPATIENT)
Facility: HOSPITAL | Age: 84
LOS: 8 days | Discharge: NURSING, REHAB OR LONG-TERM CARE | DRG: 617 | End: 2019-08-27
Attending: EMERGENCY MEDICINE | Admitting: INTERNAL MEDICINE
Payer: MEDICARE

## 2019-08-19 ENCOUNTER — APPOINTMENT (EMERGENCY)
Dept: CT IMAGING | Facility: HOSPITAL | Age: 84
DRG: 617 | End: 2019-08-19
Payer: MEDICARE

## 2019-08-19 DIAGNOSIS — L98.9 LESION OF SKIN OF SCALP: ICD-10-CM
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
## 2019-08-19 DIAGNOSIS — E11.621 DIABETIC FOOT ULCER (HCC): ICD-10-CM

## 2019-08-19 DIAGNOSIS — E87.1 HYPONATREMIA: ICD-10-CM

## 2019-08-19 DIAGNOSIS — L97.509 DIABETIC FOOT ULCER (HCC): ICD-10-CM

## 2019-08-19 DIAGNOSIS — I73.9 PERIPHERAL ARTERIAL DISEASE (HCC): ICD-10-CM

## 2019-08-19 DIAGNOSIS — N18.4 TYPE 2 DIABETES MELLITUS WITH STAGE 4 CHRONIC KIDNEY DISEASE, WITH LONG-TERM CURRENT USE OF INSULIN (HCC): ICD-10-CM

## 2019-08-19 DIAGNOSIS — Z79.4 TYPE 2 DIABETES MELLITUS WITH STAGE 4 CHRONIC KIDNEY DISEASE, WITH LONG-TERM CURRENT USE OF INSULIN (HCC): ICD-10-CM

## 2019-08-19 DIAGNOSIS — L03.115 CELLULITIS OF RIGHT FOOT: Primary | ICD-10-CM

## 2019-08-19 DIAGNOSIS — E11.22 TYPE 2 DIABETES MELLITUS WITH STAGE 4 CHRONIC KIDNEY DISEASE, WITH LONG-TERM CURRENT USE OF INSULIN (HCC): ICD-10-CM

## 2019-08-19 PROBLEM — N18.30 CKD STAGE 3 SECONDARY TO DIABETES (HCC): Status: ACTIVE | Noted: 2019-08-19

## 2019-08-19 LAB
ALBUMIN SERPL BCP-MCNC: 2.9 G/DL (ref 3.5–5)
ALP SERPL-CCNC: 64 U/L (ref 46–116)
ALT SERPL W P-5'-P-CCNC: 12 U/L (ref 12–78)
ANION GAP SERPL CALCULATED.3IONS-SCNC: 10 MMOL/L (ref 4–13)
APTT PPP: 34 SECONDS (ref 23–37)
AST SERPL W P-5'-P-CCNC: 12 U/L (ref 5–45)
BASE EX.OXY STD BLDV CALC-SCNC: 41 % (ref 60–80)
BASE EXCESS BLDV CALC-SCNC: 0.9 MMOL/L
BASOPHILS # BLD AUTO: 0.05 THOUSANDS/ΜL (ref 0–0.1)
BASOPHILS NFR BLD AUTO: 0 % (ref 0–1)
BILIRUB SERPL-MCNC: 0.6 MG/DL (ref 0.2–1)
BILIRUB UR QL STRIP: NEGATIVE
BUN SERPL-MCNC: 18 MG/DL (ref 5–25)
CALCIUM SERPL-MCNC: 9.2 MG/DL (ref 8.3–10.1)
CHLORIDE SERPL-SCNC: 92 MMOL/L (ref 100–108)
CLARITY UR: CLEAR
CO2 SERPL-SCNC: 27 MMOL/L (ref 21–32)
COLOR UR: YELLOW
CREAT SERPL-MCNC: 1.36 MG/DL (ref 0.6–1.3)
EOSINOPHIL # BLD AUTO: 0.85 THOUSAND/ΜL (ref 0–0.61)
EOSINOPHIL NFR BLD AUTO: 6 % (ref 0–6)
ERYTHROCYTE [DISTWIDTH] IN BLOOD BY AUTOMATED COUNT: 13.3 % (ref 11.6–15.1)
GFR SERPL CREATININE-BSD FRML MDRD: 45 ML/MIN/1.73SQ M
GLUCOSE SERPL-MCNC: 277 MG/DL (ref 65–140)
GLUCOSE SERPL-MCNC: 319 MG/DL (ref 65–140)
GLUCOSE SERPL-MCNC: 91 MG/DL (ref 65–140)
GLUCOSE UR STRIP-MCNC: ABNORMAL MG/DL
HCO3 BLDV-SCNC: 26.1 MMOL/L (ref 24–30)
HCT VFR BLD AUTO: 43.3 % (ref 36.5–49.3)
HGB BLD-MCNC: 14.2 G/DL (ref 12–17)
HGB UR QL STRIP.AUTO: NEGATIVE
IMM GRANULOCYTES # BLD AUTO: 0.09 THOUSAND/UL (ref 0–0.2)
IMM GRANULOCYTES NFR BLD AUTO: 1 % (ref 0–2)
INR PPP: 1.23 (ref 0.84–1.19)
KETONES UR STRIP-MCNC: NEGATIVE MG/DL
LACTATE SERPL-SCNC: 2.9 MMOL/L (ref 0.5–2)
LACTATE SERPL-SCNC: 3.4 MMOL/L (ref 0.5–2)
LEUKOCYTE ESTERASE UR QL STRIP: NEGATIVE
LIPASE SERPL-CCNC: 57 U/L (ref 73–393)
LYMPHOCYTES # BLD AUTO: 1.55 THOUSANDS/ΜL (ref 0.6–4.47)
LYMPHOCYTES NFR BLD AUTO: 12 % (ref 14–44)
MAGNESIUM SERPL-MCNC: 1.9 MG/DL (ref 1.6–2.6)
MCH RBC QN AUTO: 30.7 PG (ref 26.8–34.3)
MCHC RBC AUTO-ENTMCNC: 32.8 G/DL (ref 31.4–37.4)
MCV RBC AUTO: 94 FL (ref 82–98)
MONOCYTES # BLD AUTO: 0.86 THOUSAND/ΜL (ref 0.17–1.22)
MONOCYTES NFR BLD AUTO: 6 % (ref 4–12)
NEUTROPHILS # BLD AUTO: 9.95 THOUSANDS/ΜL (ref 1.85–7.62)
NEUTS SEG NFR BLD AUTO: 75 % (ref 43–75)
NITRITE UR QL STRIP: NEGATIVE
NRBC BLD AUTO-RTO: 0 /100 WBCS
NT-PROBNP SERPL-MCNC: 430 PG/ML
O2 CT BLDV-SCNC: 8.6 ML/DL
PCO2 BLDV: 43.5 MM HG (ref 42–50)
PH BLDV: 7.4 [PH] (ref 7.3–7.4)
PH UR STRIP.AUTO: 7.5 [PH]
PLATELET # BLD AUTO: 396 THOUSANDS/UL (ref 149–390)
PMV BLD AUTO: 9.2 FL (ref 8.9–12.7)
PO2 BLDV: 23.3 MM HG (ref 35–45)
POTASSIUM SERPL-SCNC: 5.3 MMOL/L (ref 3.5–5.3)
PROCALCITONIN SERPL-MCNC: <0.05 NG/ML
PROT SERPL-MCNC: 8.3 G/DL (ref 6.4–8.2)
PROT UR STRIP-MCNC: NEGATIVE MG/DL
PROTHROMBIN TIME: 15.6 SECONDS (ref 11.6–14.5)
RBC # BLD AUTO: 4.62 MILLION/UL (ref 3.88–5.62)
SODIUM SERPL-SCNC: 129 MMOL/L (ref 136–145)
SP GR UR STRIP.AUTO: <=1.005 (ref 1–1.03)
TROPONIN I SERPL-MCNC: <0.02 NG/ML
TSH SERPL DL<=0.05 MIU/L-ACNC: 2.07 UIU/ML (ref 0.36–3.74)
UROBILINOGEN UR QL STRIP.AUTO: 0.2 E.U./DL
WBC # BLD AUTO: 13.35 THOUSAND/UL (ref 4.31–10.16)

## 2019-08-19 PROCEDURE — 82948 REAGENT STRIP/BLOOD GLUCOSE: CPT

## 2019-08-19 PROCEDURE — 84484 ASSAY OF TROPONIN QUANT: CPT | Performed by: EMERGENCY MEDICINE

## 2019-08-19 PROCEDURE — 71046 X-RAY EXAM CHEST 2 VIEWS: CPT

## 2019-08-19 PROCEDURE — 93005 ELECTROCARDIOGRAM TRACING: CPT

## 2019-08-19 PROCEDURE — 85025 COMPLETE CBC W/AUTO DIFF WBC: CPT | Performed by: EMERGENCY MEDICINE

## 2019-08-19 PROCEDURE — 1123F ACP DISCUSS/DSCN MKR DOCD: CPT | Performed by: SURGERY

## 2019-08-19 PROCEDURE — 73701 CT LOWER EXTREMITY W/DYE: CPT

## 2019-08-19 PROCEDURE — 83690 ASSAY OF LIPASE: CPT | Performed by: EMERGENCY MEDICINE

## 2019-08-19 PROCEDURE — 83735 ASSAY OF MAGNESIUM: CPT | Performed by: EMERGENCY MEDICINE

## 2019-08-19 PROCEDURE — 83605 ASSAY OF LACTIC ACID: CPT | Performed by: INTERNAL MEDICINE

## 2019-08-19 PROCEDURE — 83605 ASSAY OF LACTIC ACID: CPT | Performed by: EMERGENCY MEDICINE

## 2019-08-19 PROCEDURE — 87040 BLOOD CULTURE FOR BACTERIA: CPT | Performed by: EMERGENCY MEDICINE

## 2019-08-19 PROCEDURE — 82805 BLOOD GASES W/O2 SATURATION: CPT | Performed by: EMERGENCY MEDICINE

## 2019-08-19 PROCEDURE — 87205 SMEAR GRAM STAIN: CPT | Performed by: INTERNAL MEDICINE

## 2019-08-19 PROCEDURE — 84145 PROCALCITONIN (PCT): CPT | Performed by: EMERGENCY MEDICINE

## 2019-08-19 PROCEDURE — 93926 LOWER EXTREMITY STUDY: CPT

## 2019-08-19 PROCEDURE — 83880 ASSAY OF NATRIURETIC PEPTIDE: CPT | Performed by: EMERGENCY MEDICINE

## 2019-08-19 PROCEDURE — 80053 COMPREHEN METABOLIC PANEL: CPT | Performed by: EMERGENCY MEDICINE

## 2019-08-19 PROCEDURE — 81003 URINALYSIS AUTO W/O SCOPE: CPT | Performed by: INTERNAL MEDICINE

## 2019-08-19 PROCEDURE — 84443 ASSAY THYROID STIM HORMONE: CPT | Performed by: INTERNAL MEDICINE

## 2019-08-19 PROCEDURE — 96365 THER/PROPH/DIAG IV INF INIT: CPT

## 2019-08-19 PROCEDURE — 99223 1ST HOSP IP/OBS HIGH 75: CPT | Performed by: INTERNAL MEDICINE

## 2019-08-19 PROCEDURE — 93922 UPR/L XTREMITY ART 2 LEVELS: CPT | Performed by: SURGERY

## 2019-08-19 PROCEDURE — 99285 EMERGENCY DEPT VISIT HI MDM: CPT

## 2019-08-19 PROCEDURE — 85610 PROTHROMBIN TIME: CPT | Performed by: EMERGENCY MEDICINE

## 2019-08-19 PROCEDURE — 85730 THROMBOPLASTIN TIME PARTIAL: CPT | Performed by: EMERGENCY MEDICINE

## 2019-08-19 PROCEDURE — 93926 LOWER EXTREMITY STUDY: CPT | Performed by: SURGERY

## 2019-08-19 PROCEDURE — 87077 CULTURE AEROBIC IDENTIFY: CPT | Performed by: INTERNAL MEDICINE

## 2019-08-19 PROCEDURE — 87186 SC STD MICRODIL/AGAR DIL: CPT | Performed by: INTERNAL MEDICINE

## 2019-08-19 PROCEDURE — 99285 EMERGENCY DEPT VISIT HI MDM: CPT | Performed by: EMERGENCY MEDICINE

## 2019-08-19 PROCEDURE — 36415 COLL VENOUS BLD VENIPUNCTURE: CPT | Performed by: EMERGENCY MEDICINE

## 2019-08-19 PROCEDURE — 87070 CULTURE OTHR SPECIMN AEROBIC: CPT | Performed by: INTERNAL MEDICINE

## 2019-08-19 RX ORDER — CEFAZOLIN SODIUM 1 G/50ML
1000 SOLUTION INTRAVENOUS EVERY 12 HOURS
Status: DISCONTINUED | OUTPATIENT
Start: 2019-08-19 | End: 2019-08-19

## 2019-08-19 RX ORDER — ACETAMINOPHEN 325 MG/1
650 TABLET ORAL EVERY 6 HOURS PRN
Status: DISCONTINUED | OUTPATIENT
Start: 2019-08-19 | End: 2019-08-27 | Stop reason: HOSPADM

## 2019-08-19 RX ORDER — DOCUSATE SODIUM 100 MG/1
100 CAPSULE, LIQUID FILLED ORAL 2 TIMES DAILY PRN
Status: DISCONTINUED | OUTPATIENT
Start: 2019-08-19 | End: 2019-08-21

## 2019-08-19 RX ORDER — CEFAZOLIN SODIUM 2 G/50ML
2000 SOLUTION INTRAVENOUS EVERY 8 HOURS
Status: DISCONTINUED | OUTPATIENT
Start: 2019-08-19 | End: 2019-08-19

## 2019-08-19 RX ORDER — CEFAZOLIN SODIUM 1 G/50ML
1000 SOLUTION INTRAVENOUS EVERY 8 HOURS
Status: DISCONTINUED | OUTPATIENT
Start: 2019-08-19 | End: 2019-08-19

## 2019-08-19 RX ORDER — ONDANSETRON 2 MG/ML
4 INJECTION INTRAMUSCULAR; INTRAVENOUS EVERY 4 HOURS PRN
Status: DISCONTINUED | OUTPATIENT
Start: 2019-08-19 | End: 2019-08-27 | Stop reason: HOSPADM

## 2019-08-19 RX ORDER — FUROSEMIDE 20 MG/1
20 TABLET ORAL DAILY
Status: DISCONTINUED | OUTPATIENT
Start: 2019-08-19 | End: 2019-08-27 | Stop reason: HOSPADM

## 2019-08-19 RX ORDER — ASPIRIN 81 MG/1
81 TABLET ORAL DAILY
Status: DISCONTINUED | OUTPATIENT
Start: 2019-08-19 | End: 2019-08-27 | Stop reason: HOSPADM

## 2019-08-19 RX ORDER — HYDRALAZINE HYDROCHLORIDE 20 MG/ML
10 INJECTION INTRAMUSCULAR; INTRAVENOUS EVERY 4 HOURS PRN
Status: DISCONTINUED | OUTPATIENT
Start: 2019-08-19 | End: 2019-08-27 | Stop reason: HOSPADM

## 2019-08-19 RX ORDER — HEPARIN SODIUM 5000 [USP'U]/ML
5000 INJECTION, SOLUTION INTRAVENOUS; SUBCUTANEOUS EVERY 8 HOURS SCHEDULED
Status: DISCONTINUED | OUTPATIENT
Start: 2019-08-19 | End: 2019-08-27 | Stop reason: HOSPADM

## 2019-08-19 RX ORDER — HYDROCODONE BITARTRATE AND ACETAMINOPHEN 5; 325 MG/1; MG/1
1 TABLET ORAL EVERY 4 HOURS PRN
Status: DISCONTINUED | OUTPATIENT
Start: 2019-08-19 | End: 2019-08-27 | Stop reason: HOSPADM

## 2019-08-19 RX ORDER — HYDROMORPHONE HCL/PF 1 MG/ML
0.2 SYRINGE (ML) INJECTION EVERY 4 HOURS PRN
Status: DISCONTINUED | OUTPATIENT
Start: 2019-08-19 | End: 2019-08-27 | Stop reason: HOSPADM

## 2019-08-19 RX ORDER — SODIUM CHLORIDE 9 MG/ML
50 INJECTION, SOLUTION INTRAVENOUS CONTINUOUS
Status: DISPENSED | OUTPATIENT
Start: 2019-08-19 | End: 2019-08-20

## 2019-08-19 RX ORDER — INSULIN ASPART 100 [IU]/ML
30 INJECTION, SUSPENSION SUBCUTANEOUS
Status: DISCONTINUED | OUTPATIENT
Start: 2019-08-19 | End: 2019-08-20

## 2019-08-19 RX ORDER — METRONIDAZOLE 500 MG/1
500 TABLET ORAL EVERY 8 HOURS SCHEDULED
Status: DISCONTINUED | OUTPATIENT
Start: 2019-08-19 | End: 2019-08-23

## 2019-08-19 RX ORDER — CEFAZOLIN SODIUM 2 G/50ML
2000 SOLUTION INTRAVENOUS EVERY 12 HOURS
Status: DISCONTINUED | OUTPATIENT
Start: 2019-08-19 | End: 2019-08-20

## 2019-08-19 RX ORDER — 0.9 % SODIUM CHLORIDE 0.9 %
3 VIAL (ML) INJECTION AS NEEDED
Status: DISCONTINUED | OUTPATIENT
Start: 2019-08-19 | End: 2019-08-27 | Stop reason: HOSPADM

## 2019-08-19 RX ADMIN — FUROSEMIDE 20 MG: 20 TABLET ORAL at 17:51

## 2019-08-19 RX ADMIN — SODIUM CHLORIDE 50 ML/HR: 0.9 INJECTION, SOLUTION INTRAVENOUS at 17:57

## 2019-08-19 RX ADMIN — ASPIRIN 81 MG: 81 TABLET, COATED ORAL at 17:50

## 2019-08-19 RX ADMIN — SODIUM CHLORIDE 1000 ML: 0.9 INJECTION, SOLUTION INTRAVENOUS at 14:00

## 2019-08-19 RX ADMIN — INSULIN ASPART 30 UNITS: 100 INJECTION, SUSPENSION SUBCUTANEOUS at 17:52

## 2019-08-19 RX ADMIN — METRONIDAZOLE 500 MG: 500 TABLET ORAL at 21:21

## 2019-08-19 RX ADMIN — ONDANSETRON 4 MG: 2 INJECTION INTRAMUSCULAR; INTRAVENOUS at 21:39

## 2019-08-19 RX ADMIN — METRONIDAZOLE 500 MG: 500 TABLET ORAL at 17:51

## 2019-08-19 RX ADMIN — HEPARIN SODIUM 5000 UNITS: 5000 INJECTION INTRAVENOUS; SUBCUTANEOUS at 21:31

## 2019-08-19 RX ADMIN — PIPERACILLIN SODIUM,TAZOBACTAM SODIUM 3.38 G: 3; .375 INJECTION, POWDER, FOR SOLUTION INTRAVENOUS at 13:04

## 2019-08-19 RX ADMIN — INSULIN LISPRO 4 UNITS: 100 INJECTION, SOLUTION INTRAVENOUS; SUBCUTANEOUS at 17:52

## 2019-08-19 RX ADMIN — HEPARIN SODIUM 5000 UNITS: 5000 INJECTION INTRAVENOUS; SUBCUTANEOUS at 17:51

## 2019-08-19 RX ADMIN — IODIXANOL 85 ML: 320 INJECTION, SOLUTION INTRAVASCULAR at 13:36

## 2019-08-19 RX ADMIN — CEFAZOLIN SODIUM 2000 MG: 2 SOLUTION INTRAVENOUS at 17:57

## 2019-08-19 NOTE — ASSESSMENT & PLAN NOTE
CKD 3 likely secondary to diabetes mellitus  Appears to be at baseline      Results from last 7 days   Lab Units 08/19/19  1259   BUN mg/dL 18   CREATININE mg/dL 1 36*   EGFR ml/min/1 73sq m 45

## 2019-08-19 NOTE — H&P
H&P- Marcel Marvin 11/11/1928, 80 y o  male MRN: [id number]  Unit/Bed#: ED 14 Encounter: [id number]  Primary Care Provider: Puma Mcintosh DPM   Date and time admitted to hospital: 8/19/2019 11:45 AM        Assessment and Plan  * Diabetic foot ulcer ([facility] )  Assessment & Plan  Diabetic foot ulcer has been seen podiatrist Puma Mcintosh DPM for 3-4 weeks  Wound is currently foul-smelling  X-ray images suggest this of osteomyelitis without abscess  Wound culture sent in the emergency department  Will start cefazolin/metronidazole and have both Podiatry and Infectious Disease evaluate  Given history of coronary disease and diabetes mellitus, will also check lower extremity arterial duplex  Should the patient knee surgery he is of acceptable risk as his medical comorbidities are stable and further testing will not decrease this risk  Hyponatremia  Assessment & Plan  Hyponatremia likely in setting of diuretic use  For completeness at [facility] to available labs to rule out hypothyroidism as cause    Results from last 7 days   Lab Units 08/19/19  1259   SODIUM mmol/L 129*       CKD stage 3 secondary to diabetes [facility])  Assessment & Plan  CKD 3 likely secondary to diabetes mellitus  Appears to be at baseline  Results from last 7 days   Lab Units 08/19/19  1259   BUN mg/dL 18   CREATININE mg/dL 1 36*   EGFR ml/min/1 73sq m 45       BPH (benign prostatic hyperplasia)  Assessment & Plan  Reported history of BPH without active symptoms    CAD (coronary artery disease)  Assessment & Plan  Coronary artery disease with history of CABG  No active chest pain  Continue aspirin    Dementia  Assessment & Plan  Dementia reportedly previously on donepezil    Monitor for any signs of sundowning    SSS (sick sinus syndrome) [facility])  Assessment & Plan  Sick sinus syndrome status post pacer    Congestive heart failure (CHF) ([facility] )  Assessment & Plan  Wt Readings from Last 3 Encounters:   08/19/19 77 1 kg (170 lb)   05/28/19 80 7 kg (178 lb)   05/08/19 72 6 kg (160 lb)     Chronic diastolic CHF on furosemide  Previously on lisinopril but no longer taking    Hyperlipidemia  Assessment & Plan  Hyperlipidemia previously on pravastatin  Statin likely of little benefit for this advanced age gentleman    Type 2 diabetes mellitus with diabetic chronic kidney disease ([facility] )  Assessment & Plan  Lab Results   Component Value Date    HGBA1C 7 0 (H) 05/10/2019       No results for input(s): POCGLU in the last 72 hours  Type 2 diabetes mellitus on 70/30 30 units b i d   Add sliding scale    VTE Prophylaxis: Heparin  Code Status: Level 1 - Full Code  Anticipated Length of Stay:  Patient will be admitted on an Inpatient basis with an anticipated length of stay of  greater than 2 midnights  Justification for Hospital Stay: Diabetic foot ulcer ([facility] )  Total Time for Visit, including Counseling / Coordination of Care: 45 mins  Greater than 50% of this total time spent on direct patient counseling and coordination of care  Chief Complaint:     Chief Complaint   Patient presents with    Foot Ulcer     pt has hx of diabetes and statesto have had "opening on outside of right foot for past month that has progressively gotten worse" pt was at podiatrist this morning and was informed to come to ED to be evaluated  area is open, red swollen, slough noted     History of Present Illness:    Nadia Hampton is a 80 y o  demented male with a history of diabetes mellitus, coronary disease status post CABG, and sick sinus syndrome who presents with worsening right foot ulcer  Patient does not remember the exact events but does note that several weeks ago had found right lateral foot ulcer  He has seen a podiatrist on several occasions and has been prescribed a salve as well as oral antibiotics but does not know which one  He denies any systemic symptoms such as fevers or chills    When he went to see his podiatrist today Frank Cosby DPM, he was referred to the emergency department where imaging is concerning for osteomyelitis  At this time internal medicine was asked to admit the patient  Patient has mild pain mainly with weight-bearing  He lives alone as his wife passed away last year  He has good exercise tolerance and is able to ambulate with rolling walker or cane without any angina symptoms  Review of Systems:  History obtained from chart review, the patient and aide at bedside  General ROS: negative for - chills or fever  Psychological ROS: negative for - disorientation or hallucinations  Ophthalmic ROS: negative for - loss of vision  Respiratory ROS: negative for - cough or shortness of breath  Cardiovascular ROS: negative for - chest pain  Gastrointestinal ROS: negative for - diarrhea or nausea/vomiting  Genito-Urinary ROS: negative for - dysuria  Musculoskeletal ROS: positive for -  pain in right foot  Neurological ROS: positive for - memory loss  Otherwise, all other 12 point review of systems normal     Past Medical and Surgical History:   Past Medical History:   Diagnosis Date    BPH (benign prostatic hyperplasia)     CAD (coronary artery disease)     Congestive heart failure (CHF) ([place] [address] )     RESOLVED:     Dementia     Diabetes mellitus ([place] [address] )     History of colonoscopy     Hyperlipidemia     SSS (sick sinus syndrome) ([place] [address] )      Past Surgical History:   Procedure Laterality Date    COLONOSCOPY      RESOLVED: 2016    CORONARY ARTERY BYPASS GRAFT      FOUR-VESSEL BYPASS GRAFT INCLUDING INTERNAL MAMMARY TO LAD; RESOLVED: SEP 2010     Meds/Allergies: Allergies: No Known Allergies  Prior to Admission Medications   Prescriptions Last Dose Informant Patient Reported? Taking?    BD INSULIN SYRINGE U/F 31G X /16" 0 5 ML MISC 2019 at Unknown time  Yes Yes   Si (two) times a day   NOVOLIN 70/30 (70-30) 100 UNIT/ML subcutaneous injection   No No   Sig: inject 30 units subcutaneously twice a day   Patient taking differently: Inject 35 Units under the skin 2 (two) times a day before meals    aspirin 81 MG tablet 2019 at Unknown time Self Yes Yes   Sig: Take 1 tablet by mouth daily   cholecalciferol (VITAMIN D3) 1,000 units tablet 2019 at Unknown time Self Yes Yes   Sig: Take 1,000 Units by mouth daily   furosemide (LASIX) 20 mg tablet Not Taking at Unknown time  No No   Sig: TAKE ONE tablet BY MOUTH every DAY   Patient not taking: Reported on 2019   metFORMIN (GLUCOPHAGE-XR) 500 mg 24 hr tablet   No No   Sig: TAKE FOUR tablets BY MOUTH every morning   nitroglycerin (NITROSTAT) 0 4 mg SL tablet  Self Yes No   Sig: Place under the tongue   oxybutynin (DITROPAN) 5 mg tablet Not Taking at Unknown time  No No   Sig: TAKE ONE TABLET BY MOUTH TWICE DAILY   Patient not taking: Reported on 2019      Facility-Administered Medications: None     Social History:     Social History     Socioeconomic History    Marital status: /Civil Union     Spouse name: Not on file    Number of children: Not on file    Years of education: Not on file    Highest education level: Not on file   Occupational History    Not on file   Social Needs    Financial resource strain: Not on file    Food insecurity:     Worry: Not on file     Inability: Not on file    Transportation needs:     Medical: Not on file     Non-medical: Not on file   Tobacco Use    Smoking status: Former Smoker     Packs/day: 1 00     Years: 25 00     Pack years: 25 00     Last attempt to quit: 1973     Years since quittin 6    Smokeless tobacco: Never Used   Substance and Sexual Activity    Alcohol use: Yes     Comment: RARELY     Drug use: No    Sexual activity: Not Currently   Lifestyle    Physical activity:     Days per week: Not on file     Minutes per session: Not on file    Stress: Not on file   Relationships    Social connections:     Talks on phone: Not on file     Gets together: Not on file     Attends Worship service: Not on file Active member of club or organization: Not on file     Attends meetings of clubs or organizations: Not on file     Relationship status: Not on file    Intimate partner violence:     Fear of current or ex partner: Not on file     Emotionally abused: Not on file     Physically abused: Not on file     Forced sexual activity: Not on file   Other Topics Concern    Not on file   Social History Narrative    Not on file     Patient Pre-hospital Living Situation:  Lives alone as wife passed away last year  Patient Pre-hospital Level of Mobility:  Rolling walker or cane  Patient Pre-hospital Diet Restrictions:     Family History:  Family History   Problem Relation Age of Onset    No Known Problems Mother      Physical Exam:   Vitals:   Blood Pressure: 152/68 (08/19/19 1454)  Pulse: 72 (08/19/19 1454)  Temperature: 97 9 °F (36 6 °C) (08/19/19 1454)  Temp Source: Oral (08/19/19 1138)  Respirations: 18 (08/19/19 1454)  Weight - Scale: 77 1 kg (170 lb) (08/19/19 1138)  SpO2: 99 % (08/19/19 1454)    General appearance: alert, appears stated age and cooperative  Skin: eschar/ulcer over lateral right foot as pictured  Head: Normocephalic, without obvious abnormality, atraumatic  Eyes: conjunctivae/corneas clear  PERRL, EOM's intact  Lungs: clear to auscultation bilaterally  Heart: regular rate and rhythm  Abdomen: soft, non-tender; bowel sounds normal; no masses,  no organomegaly  Back: symmetric, no curvature  ROM normal  No CVA tenderness  Extremities: wound right lateral foot  Neurologic: Grossly normal  Psychiatric:  Appropriate mood good eye contact    Lab Results: I have personally reviewed pertinent reports      Results from last 7 days   Lab Units 08/19/19  1259   WBC Thousand/uL 13 35*   HEMOGLOBIN g/dL 14 2   HEMATOCRIT % 43 3   PLATELETS Thousands/uL 396*   NEUTROS PCT % 75   LYMPHS PCT % 12*   MONOS PCT % 6   EOS PCT % 6     Results from last 7 days   Lab Units 08/19/19  1259   SODIUM mmol/L 129*   POTASSIUM mmol/L 5 3   CHLORIDE mmol/L 92*   CO2 mmol/L 27   ANION GAP mmol/L 10   BUN mg/dL 18   CREATININE mg/dL 1 36*   CALCIUM mg/dL 9 2   ALBUMIN g/dL 2 9*   TOTAL BILIRUBIN mg/dL 0 60   ALK PHOS U/L 64   ALT U/L 12   AST U/L 12   EGFR ml/min/1 73sq m 45   GLUCOSE RANDOM mg/dL 319*     Results from last 7 days   Lab Units 08/19/19  1259   INR  1 23*     Results from last 7 days   Lab Units 08/19/19  1259   TROPONIN I ng/mL <0 02     Results from last 7 days   Lab Units 08/19/19  1259   LACTIC ACID mmol/L 2 9*         Results from last 7 days   Lab Units 08/19/19  1259   NT-PRO BNP pg/mL 430             Imaging: I have personally reviewed pertinent films in PACS  Xr Chest 2 Views  Result Date: 8/19/2019  Impression: No acute cardiopulmonary disease  Workstation performed: [id number]     Ct Foot Right W Contrast  Result Date: 8/19/2019  Impression: 1  Ulcer in the lateral aspect of the right forefoot, adjacent to the head of the right 5th metatarsal with surrounding cellulitis  2   Osteomyelitis of the head of the right 5th metatarsal  3   Adjacent cellulitis with no evidence of abscess or soft tissue gas  Workstation performed: [id number]       EKG, Pathology, and Other Studies Reviewed on Admission:   Reviewed outside and previous records    Allscripts/ Epic Records Reviewed: Yes    ** Please Note: This note has been constructed using a voice recognition system   **

## 2019-08-19 NOTE — SOCIAL WORK
Cm met with patient at bedside, patient presenting alert and oriented during interview  He was able to tell me the current year, who the president was prior to the interview  Cm informed by patient he resides home alone, patient reports his wife passed away last year and he has two boys that reside in [place], [place] and [facility]  Patient reports [place] is his POA  Patient stated his children own an  shop in [place]  Patient also mentioned having a sister that resides in [facility]  Patient mentioned that he does not have family living in this area  Patient reports he is independent with ADL's, no dme use or o2  Patient states he has hha from [facility] from 9-12p and 3p-6pm, patient stated he is okay with being home alone in the absence of the hha  Patient reports filling his prescriptions at [facility] of [address]  [place] with no co-payment barriers and is transported to his MD appointments by the hha  Patient reports having adequate diabetic supplies, he reports self-administering his medications twice a day  Patient mentioned the wound on his foot is being followed by a podiatrist within [facility]  Patient mentioned having the wound on his foot for 2 weeks or so  Patient is agreeable to vna upon discharge, no preference in agency  Cm team awaiting PT eval and will continue to follow  CM reviewed discharge planning process including the following: identifying help at home, patient preference for discharge planning needs, pharmacy preference, and availability of treatment team to discuss questions or concerns patient and/or family may have regarding understanding medications and recognizing signs and symptoms once discharged  CM also encouraged patient to follow up with all recommended appointments after discharge  Patient advised of importance for patient and family to participate in managing patients medical well being

## 2019-08-19 NOTE — CONSULTS
Consultation - Infectious Disease   Evelin Farrell 80 y o  male MRN: [id number]  Unit/Bed#: -01 Encounter: [id number]      IMPRESSION & RECOMMENDATIONS:   1  Diabetic foot ulcer with cellulitis  Patient presents at this time with progression of the previously diagnosed diabetic foot ulcer with cellulitis  Imaging concerning for osteomyelitis  Patient is fortunately hemodynamically stable  No other prior culture data in our system  Continue patient on renally dosed Ancef with Flagyl  Continue to trend fever curve/vitals  Repeat CBC/chemistry tomorrow  Serial foot exams  Podiatry evaluation pending  Vascular studies pending  Follow up pending cultures  Additional care as per primary  Additional interventions pending clinical course    2  Fifth metatarsal osteomyelitis  Patient noted with progressive diabetic foot ulcer and now osteomyelitis on imaging  Patient has failed outpatient antibiotic therapy possibly with amoxicillin  Continue renally dosed Ancef with Flagyl  Continue to trend fever curve/vitals  Serial exams  Podiatry evaluation pending  Additional care as per primary    3  Leukocytosis  Patient with elevated white blood cell count on admission which is likely due to the above  No other obvious sources on exam   Repeat CBC tomorrow  Additional care as above    4  Chronic kidney disease  Patient with underlying chronic kidney disease  Creatinine appears to be close to baseline  Ancef currently dose reduced  No dose adjustment for Flagyl  Repeat chemistry tomorrow  Will further dose adjust antibiotics as needed    5  Poorly controlled diabetes  Patient noted with hemoglobin A1c of 7  Unfortunately this is risk factor for progressing wounds  Glucose management as per primary   Antibiotics as above  6  Sick sinus syndrome with pacemaker  No acute issues noted at pacemaker site  Follow up pending blood cultures  7  Reported dementia  Patient seems to have issues related to time  He may have underlying dementia  Currently is fully independent and lives on his own  Placement/service issues as per primary service  Recommend Case Management follow-up      Above plan discussed in detail with the patient  Above plan discussed in detail with primary service  ID consult service will continue to follow  HISTORY OF PRESENT ILLNESS:  Reason for Consult:  Osteomyelitis and diabetic foot ulcer    HPI: Saint Lively is a 80y o  year old male with past medical history significant for coronary artery disease, dementia, diabetes who presented to the emergency department today with worsening right foot ulcer  Reportedly this wound began to develop several weeks ago the right lateral foot  Patient was apparently being followed by Podiatry as an outpatient was being prescribed local measures as well as oral antibiotic  He did not report any systemic symptoms on presentation  He was seen by Podiatry today and was referred to the emergency department given concern for osteomyelitis  Patient appeared clinically stable  White count elevated at 13 3  Creatinine elevated at 1 3  Lactate elevated at 2 9  Chest x-ray was negative  CT of the foot was done which showed ulceration of the right forefoot with surrounding cellulitis and osteomyelitis of the right 5th metatarsal   No abscess or soft tissue gas noted on imaging  Patient has pacemaker in place  He was admitted for this progressing wound plan for possible surgical intervention  Patient remains afebrile  Blood and wound cultures are pending  No new imaging  Patient's other vitals are stable  Repeat lactate pending  Patient has not been seen by our service previously  No prior significant culture data in our system  Prior culture data in Care everywhere  Unclear prior antibiotic regimen/therapy based on medication records in epic    We are consulted at this time for further assistance in management given this patient's progressing diabetic foot ulcers with finding of osteomyelitis on imaging  On evaluation, patient is a very pleasant 70-year-old gentleman  He reports that he is largely independent at home  He is able to do things for himself and only has a home health aide in twice a week  He currently denies having any nausea, vomiting, chest pain or shortness of breath  He vaguely recalls being on amoxicillin recently  He reports that he was sent in by his podiatrist   He believes that the wound has been there at least for 2 weeks because that is when he 1st noticed it  He can't recall the main injury that led to this ulceration  Does not have a previous history of foot ulcerations or amputations  He does have a pacemaker in place and has not had any issues with it  He has no other breakdown or open wounds to report  He denies having any significant allergies to antibiotics  He has no other new complaints at this time  REVIEW OF SYSTEMS:  A complete 12 point system-based review of systems is negative other than that noted in the HPI      PAST MEDICAL HISTORY:  Past Medical History:   Diagnosis Date    BPH (benign prostatic hyperplasia)     CAD (coronary artery disease)     [facility]     RESOLVED: [id number]    Dementia     Diabetes mellitus (HCC)     History of colonoscopy     Hyperlipidemia     SSS [facility]sick [facility] syndrome) [facility]      Past Surgical History:   Procedure Laterality Date    COLONOSCOPY      RESOLVED: 26OCT2016    CORONARY ARTERY BYPASS GRAFT      FOUR-VESSEL BYPASS GRAFT INCLUDING INTERNAL MAMMARY TO LAD; RESOLVED: SEP 2010       FAMILY HISTORY:  Non-contributory    SOCIAL HISTORY:  Social History   Social History     Substance and Sexual Activity   Alcohol Use Yes    Comment: RARELY      Social History     Substance and Sexual Activity   Drug Use No     Social History     Tobacco Use   Smoking Status Former Smoker    Packs/day: 1 00    Years: 25 00    Pack years: 25 00    Last attempt to quit: 1973    Years since quittin 6   Smokeless Tobacco Never Used       ALLERGIES:  No Known Allergies    MEDICATIONS:  All current active medications have been reviewed  PHYSICAL EXAM:  Temp:  [97 9 °F (36 6 °C)-98 5 °F (36 9 °C)] 97 9 °F (36 6 °C)  HR:  [72-80] 72  Resp:  [16-19] 18  BP: (133-160)/(60-74) 152/68  SpO2:  [98 %-99 %] 99 %  Temp (24hrs), Av 2 °F (36 8 °C), Min:97 9 °F (36 6 °C), Max:98 5 °F (36 9 °C)  Current: Temperature: 97 9 °F (36 6 °C)    Intake/Output Summary (Last 24 hours) at 2019 1625  Last data filed at 2019 1610  Gross per 24 hour   Intake 50 ml   Output 100 ml   Net -50 ml       General Appearance:  Appearing well, nontoxic, and in no distress; patient appears stated age  Head:  Normocephalic, without obvious abnormality, atraumatic   Eyes:  Conjunctiva pink and sclera anicteric, both eyes   Nose: Nares normal, mucosa normal, no drainage   Throat: Oropharynx moist without lesions; significantly poor dentition noted  Neck: Supple, symmetrical, no adenopathy, no tenderness/mass/nodules   Back:   Symmetric, no curvature, ROM normal, no CVA tenderness; no spinal or paraspinal muscle tenderness to palpation  Lungs:   Clear to auscultation bilaterally, respirations unlabored on room air   Chest Wall:  No tenderness or deformity; pacemaker site without any acute skin changes  Heart:  RRR; no murmur, rub or gallop   Abdomen:   Soft, non-tender, non-distended, positive bowel sounds    Extremities: No cyanosis, clubbing or edema   Skin: Patient has a right lateral foot wound which is necrotic and has some serous/bloody drainage from the wound itself  There is a foul smell present  There is also a blistering lesion further up the foot without significant fluctuance  Minimal ned wound erythema     Lymph nodes: Cervical, supraclavicular nodes normal   Neurologic: Alert and oriented times 3, patient has difficulties recalling issues related to time  , extremity strength 5/5 and symmetric       LABS, IMAGING, & OTHER STUDIES:  Lab Results:  I have personally reviewed pertinent labs  Results from last 7 days   Lab Units 08/19/19  1259   WBC Thousand/uL 13 35*   HEMOGLOBIN g/dL 14 2   PLATELETS Thousands/uL 396*     Results from last 7 days   Lab Units 08/19/19  1259   POTASSIUM mmol/L 5 3   CHLORIDE mmol/L 92*   CO2 mmol/L 27   BUN mg/dL 18   CREATININE mg/dL 1 36*   EGFR ml/min/1 73sq m 45   CALCIUM mg/dL 9 2   AST U/L 12   ALT U/L 12   ALK PHOS U/L 64           Imaging Studies:   I have personally reviewed pertinent imaging study reports and images in PACS  Other Studies:   I have personally reviewed pertinent reports

## 2019-08-19 NOTE — ASSESSMENT & PLAN NOTE
Wt Readings from Last 3 Encounters:   08/19/19 77 1 kg (170 lb)   05/28/19 80 7 kg (178 lb)   05/08/19 72 6 kg (160 lb)     Chronic diastolic CHF on furosemide    Previously on lisinopril but no longer taking

## 2019-08-19 NOTE — ASSESSMENT & PLAN NOTE
Hyperlipidemia previously on pravastatin    Statin likely of little benefit for this advanced age gentleman

## 2019-08-19 NOTE — PLAN OF CARE
Problem: Nutrition/Hydration-ADULT  Goal: Nutrient/Hydration intake appropriate for improving, restoring or maintaining nutritional needs  Description  Monitor and assess patient's nutrition/hydration status for malnutrition  Collaborate with interdisciplinary team and initiate plan and interventions as ordered  Monitor patient's weight and dietary intake as ordered or per policy  Utilize nutrition screening tool and intervene as necessary  Determine patient's food preferences and provide high-protein, high-caloric foods as appropriate       INTERVENTIONS:  - Monitor oral intake, urinary output, labs, and treatment plans  - Assess nutrition and hydration status and recommend course of action  - Evaluate amount of meals eaten  - Assist patient with eating if necessary   - Allow adequate time for meals  - Recommend/ encourage appropriate diets, oral nutritional supplements, and vitamin/mineral supplements  - Order, calculate, and assess calorie counts as needed  - Recommend, monitor, and adjust tube feedings and TPN/PPN based on assessed needs  - Assess need for intravenous fluids  - Provide specific nutrition/hydration education as appropriate  - Include patient/family/caregiver in decisions related to nutrition  Outcome: Progressing     Problem: PAIN - ADULT  Goal: Verbalizes/displays adequate comfort level or baseline comfort level  Description  Interventions:  - Encourage patient to monitor pain and request assistance  - Assess pain using appropriate pain scale  - Administer analgesics based on type and severity of pain and evaluate response  - Implement non-pharmacological measures as appropriate and evaluate response  - Consider cultural and social influences on pain and pain management  - Notify physician/advanced practitioner if interventions unsuccessful or patient reports new pain  Outcome: Progressing     Problem: INFECTION - ADULT  Goal: Absence or prevention of progression during hospitalization  Description  INTERVENTIONS:  - Assess and monitor for signs and symptoms of infection  - Monitor lab/diagnostic results  - Monitor all insertion sites, i e  indwelling lines, tubes, and drains  - Monitor endotracheal if appropriate and nasal secretions for changes in amount and color  - [place] appropriate cooling/warming therapies per order  - Administer medications as ordered  - Instruct and encourage patient and family to use good hand hygiene technique  - Identify and instruct in appropriate isolation precautions for identified infection/condition  Outcome: Progressing     Problem: SAFETY ADULT  Goal: Patient will remain free of falls  Description  INTERVENTIONS:  - Assess patient frequently for physical needs  -  Identify cognitive and physical deficits and behaviors that affect risk of falls    -  [place] fall precautions as indicated by assessment   - Educate patient/family on patient safety including physical limitations  - Instruct patient to call for assistance with activity based on assessment  - Modify environment to reduce risk of injury  - Consider OT/PT consult to assist with strengthening/mobility  Outcome: Progressing  Goal: Maintain or return to baseline ADL function  Description  INTERVENTIONS:  -  Assess patient's ability to carry out ADLs; assess patient's baseline for ADL function and identify physical deficits which impact ability to perform ADLs (bathing, care of mouth/teeth, toileting, grooming, dressing, etc )  - Assess/evaluate cause of self-care deficits   - Assess range of motion  - Assess patient's mobility; develop plan if impaired  - Assess patient's need for assistive devices and provide as appropriate  - Encourage maximum independence but intervene and supervise when necessary  - Involve family in performance of ADLs  - Assess for home care needs following discharge   - Consider OT consult to assist with ADL evaluation and planning for discharge  - Provide patient education as appropriate  Outcome: Progressing  Goal: Maintain or return mobility status to optimal level  Description  INTERVENTIONS:  - Assess patient's baseline mobility status (ambulation, transfers, stairs, etc )    - Identify cognitive and physical deficits and behaviors that affect mobility  - Identify mobility aids required to assist with transfers and/or ambulation (gait belt, sit-to-stand, lift, walker, cane, etc )  - [place] fall precautions as indicated by assessment  - Record patient progress and toleration of activity level on Mobility SBAR; progress patient to next Phase/Stage  - Instruct patient to call for assistance with activity based on assessment  - Consider rehabilitation consult to assist with strengthening/weightbearing, etc   Outcome: Progressing     Problem: DISCHARGE PLANNING  Goal: Discharge to home or other facility with appropriate resources  Description  INTERVENTIONS:  - Identify barriers to discharge w/patient and caregiver  - Arrange for needed discharge resources and transportation as appropriate  - Identify discharge learning needs (meds, wound care, etc )  - Arrange for interpretive services to assist at discharge as needed  - Refer to Case Management Department for coordinating discharge planning if the patient needs post-hospital services based on physician/advanced practitioner order or complex needs related to functional status, cognitive ability, or social support system  Outcome: Progressing     Problem: Knowledge Deficit  Goal: Patient/family/caregiver demonstrates understanding of disease process, treatment plan, medications, and discharge instructions  Description  Complete learning assessment and assess knowledge base    Interventions:  - Provide teaching at level of understanding  - Provide teaching via preferred learning methods  Outcome: Progressing

## 2019-08-19 NOTE — ASSESSMENT & PLAN NOTE
Hyponatremia likely in setting of diuretic use    For completeness at [facility] to available labs to rule out hypothyroidism as cause    Results from last 7 days   Lab Units 08/19/19  1259   SODIUM mmol/L 129*

## 2019-08-19 NOTE — ED PROVIDER NOTES
History  Chief Complaint   Patient presents with    Foot Ulcer     pt has hx of diabetes and statesto have had "opening on outside of right foot for past month that has progressively gotten worse" pt was at podiatrist this morning and was informed to come to ED to be evaluated  area is open, red swollen, slough noted     80year old male patient presents emergency department for evaluation of a nonhealing ulcer in his right foot  Patient states he injured his foot approximately two weeks ago and has not been healing correctly  They are currently areas of necrotic tissue surrounding the an open wound on the lateral aspect of his right foot with some surrounding cellulitis to that  The patient does not seem to be concerned about this until today when he came in for evaluation  The patient will full septic evaluation done and will be CT scan to assess for osteomyelitis  The patient will require IV antibiotics and admission to the hospital           Prior to Admission Medications   Prescriptions Last Dose Informant Patient Reported? Taking?    BD INSULIN SYRINGE U/F 31G X " 0 5 ML MISC 2019 at Unknown time  Yes Yes   Si (two) times a day   NOVOLIN 70/30 (70-30) 100 UNIT/ML subcutaneous injection   No No   Sig: inject 30 units subcutaneously twice a day   Patient taking differently: Inject 35 Units under the skin 2 (two) times a day before meals    aspirin 81 MG tablet 2019 at Unknown time Self Yes Yes   Sig: Take 1 tablet by mouth daily   cholecalciferol (VITAMIN D3) 1,000 units tablet 2019 at Unknown time Self Yes Yes   Sig: Take 1,000 Units by mouth daily   furosemide (LASIX) 20 mg tablet Not Taking at Unknown time  No No   Sig: TAKE ONE tablet BY MOUTH every DAY   Patient not taking: Reported on 2019   metFORMIN (GLUCOPHAGE-XR) 500 mg 24 hr tablet   No No   Sig: TAKE FOUR tablets BY MOUTH every morning   nitroglycerin (NITROSTAT) 0 4 mg SL tablet  Self Yes No   Sig: Place under the tongue   oxybutynin (DITROPAN) 5 mg tablet Not Taking at Unknown time  No No   Sig: TAKE ONE TABLET BY MOUTH TWICE DAILY   Patient not taking: Reported on 2019      Facility-Administered Medications: None       Past Medical History:   Diagnosis Date    BPH (benign prostatic hyperplasia)     CAD (coronary artery disease)     Congestive heart failure (CHF) ([facility] )     RESOLVED:     Dementia     Diabetes mellitus ([facility] )     History of colonoscopy     Hyperlipidemia     SSS [facility]sick sinus syndrome) ([facility])        Past Surgical History:   Procedure Laterality Date    COLONOSCOPY      RESOLVED: 2016    CORONARY ARTERY BYPASS GRAFT      FOUR-VESSEL BYPASS GRAFT INCLUDING INTERNAL MAMMARY TO LAD; RESOLVED: SEP 2010       Family History   Problem Relation Age of Onset    No Known Problems Mother      I have reviewed and agree with the history as documented      Social History     Tobacco Use    Smoking status: Former Smoker     Packs/day:      Years: 25      Pack years: 25      Last attempt to quit:      Years since quittin 6    Smokeless tobacco: Never Used   Substance Use Topics    Alcohol use: Yes     Comment: RARELY     Drug use: No        Review of Systems    Physical Exam  Physical Exam    Vital Signs  ED Triage Vitals [19 1138]   Temperature Pulse Respirations Blood Pressure SpO2   98 5 °F (36 9 °C) 80 19 160/74 98 %      Temp Source Heart Rate Source Patient Position - Orthostatic VS BP Location FiO2 (%)   Oral Monitor Sitting Left arm --      Pain Score       8           Vitals:    19 1253 19 1258 19 1307 19 1528   BP: 140/66 145/88 158/75 137/61   Pulse: 63 59 59 67   Patient Position - Orthostatic VS:             Visual Acuity      ED Medications  Medications   sodium chloride (PF) 0 9 % injection 3 mL (has no administration in time range)   aspirin (ECOTRIN LOW STRENGTH) EC tablet 81 mg (81 mg Oral Given 19 0831)   furosemide (LASIX) tablet 20 mg (20 mg Oral Given 8/21/19 0831)   heparin (porcine) subcutaneous injection 5,000 Units (5,000 Units Subcutaneous Given 8/21/19 1458)   insulin lispro (HumaLOG) 100 units/mL subcutaneous injection 1-6 Units (1 Units Subcutaneous Given 8/21/19 1459)   acetaminophen (TYLENOL) tablet 650 mg (has no administration in time range)   magnesium hydroxide (MILK OF MAGNESIA) 400 mg/5 mL oral suspension 30 mL (has no administration in time range)   ondansetron (ZOFRAN) injection 4 mg (4 mg Intravenous Given 8/19/19 2139)   hydrALAZINE (APRESOLINE) injection 10 mg (has no administration in time range)   HYDROcodone-acetaminophen (NORCO) 5-325 mg per tablet 1 tablet (has no administration in time range)   HYDROmorphone (DILAUDID) injection 0 2 mg (has no administration in time range)   sodium chloride 0 9 % infusion (50 mL/hr Intravenous New Bag 8/19/19 1757)   metroNIDAZOLE (FLAGYL) tablet 500 mg (500 mg Oral Given 8/21/19 1458)   docusate sodium (COLACE) capsule 100 mg (has no administration in time range)   vancomycin (VANCOCIN) 1,500 mg in sodium chloride 0 9 % 250 mL IVPB (0 mg Intravenous Stopped 8/21/19 1157)   piperacillin-tazobactam (ZOSYN) 3 375 g in sodium chloride 0 9 % 50 mL IVPB (0 g Intravenous Stopped 8/19/19 1413)   iodixanol (VISIPAQUE) 320 MG/ML injection 85 mL (85 mL Intravenous Given 8/19/19 1336)   sodium chloride 0 9 % bolus 1,000 mL (1,000 mL Intravenous New Bag 8/19/19 1400)   iohexol (OMNIPAQUE) 350 MG/ML injection (MULTI-DOSE) 120 mL (120 mL Intravenous Given 8/20/19 1709)   midazolam (VERSED) injection (0 5 mg Intravenous Given 8/21/19 1149)   fentanyl citrate (PF) 100 MCG/2ML (50 mcg Intravenous Given 8/21/19 1237)   lidocaine (XYLOCAINE) 1 % injection (2 mL Infiltration Given 8/21/19 1202)   heparin (porcine) injection (2,000 Units Intravenous Given 8/21/19 1236)   iodixanol (VISIPAQUE) 320 MG/ML injection 110 mL (110 mL Intravenous Given 8/21/19 1344)       Diagnostic Studies  Results Reviewed     Procedure Component Value Units Date/Time    Wound culture and Gram stain [[id number]]  (Abnormal)  (Susceptibility) Collected:  08/19/19 1422    Lab Status:  Preliminary result Specimen:  Wound from Foot, Right Updated:  08/21/19 1322     Wound Culture 4+ Growth of Methicillin Resistant Staphylococcus aureus      2+ Growth of Proteus vulgaris group     Gram Stain Result Rare Polys      3+ Gram positive cocci in pairs      Rare Gram negative rods      Rare Gram positive rods    Susceptibility     Methicillin Resistant Staphylococcus aureus (1)     Antibiotic Interpretation Microscan Method Status    Ampicillin ($$) Resistant >8 00 ug/ml ROSALINDA Preliminary    Cefazolin ($) Resistant 8 00 ug/ml ROSALINDA Preliminary    Clindamycin ($) Susceptible <=0 25 ug/ml ROSALINDA Preliminary    Erythromycin ($$$$) Resistant >4 00 ug/ml ROSALINDA Preliminary    Gentamicin ($$) Susceptible <=1 ug/ml ROSALINDA Preliminary    Oxacillin Resistant >2 00 ug/ml ROSALINDA Preliminary    Tetracycline Susceptible <=2 ug/ml ROSALINDA Preliminary    Trimethoprim + Sulfamethoxazole ($$$) Susceptible <=0 5/9 5 ug/ml ROSALINDA Preliminary    Vancomycin ($) Susceptible 1 00 ug/ml ROSALINDA Preliminary                   Blood culture [[id number]] Collected:  08/19/19 1300    Lab Status:  Preliminary result Specimen:  Blood from Arm, Right Updated:  08/20/19 2201     Blood Culture No Growth at 24 hrs  Procalcitonin [[id number]]  (Normal) Collected:  08/19/19 1259    Lab Status:  Final result Specimen:  Blood from Arm, Right Updated:  08/19/19 1920     Procalcitonin <0 05 ng/ml     Lactate Blood [[id number]]  (Abnormal) Collected:  08/19/19 1719    Lab Status:  Final result Specimen:  Blood from Arm, Right Updated:  08/19/19 1804     LACTIC ACID 3 4 mmol/L     Narrative:       Result may be elevated if tourniquet was used during collection      Urinalysis with culture and sensitivity reflex [[id number]]  (Abnormal) Collected:  08/19/19 1627    Lab Status:  Final result Specimen:  Urine, Clean Catch Updated:  08/19/19 1645     Color, UA Yellow     Clarity, UA Clear     Specific Gravity, UA <=1 005     pH, UA 7 5     Leukocytes, UA Negative     Nitrite, UA Negative     Protein, UA Negative mg/dl      Glucose,  (1/4%) mg/dl      Ketones, UA Negative mg/dl      Urobilinogen, UA 0 2 E U /dl      Bilirubin, UA Negative     Blood, UA Negative    TSH, 3rd generation [[id number]]  (Normal) Collected:  08/19/19 1259    Lab Status:  Final result Specimen:  Blood from Arm, Right Updated:  08/19/19 1558     TSH 3RD GENERATON 2 068 uIU/mL     Narrative:       Patients undergoing fluorescein dye angiography may retain small amounts of fluorescein in the body for 48-72 hours post procedure  Samples containing fluorescein can produce falsely depressed TSH values  If the patient had this procedure,a specimen should be resubmitted post fluorescein clearance  Lactate Blood [[id number]]  (Abnormal) Collected:  08/19/19 1259    Lab Status:  Final result Specimen:  Blood from Arm, Right Updated:  08/19/19 1341     LACTIC ACID 2 9 mmol/L     Narrative:       Result may be elevated if tourniquet was used during collection      B-type natriuretic peptide [[id number]]  (Normal) Collected:  08/19/19 1259    Lab Status:  Final result Specimen:  Blood from Arm, Right Updated:  08/19/19 1332     NT-proBNP 430 pg/mL     Lipase [[id number]]  (Abnormal) Collected:  08/19/19 1259    Lab Status:  Final result Specimen:  Blood from Arm, Right Updated:  08/19/19 1332     Lipase 57 u/L     Magnesium [[id number]]  (Normal) Collected:  08/19/19 1259    Lab Status:  Final result Specimen:  Blood from Arm, Right Updated:  08/19/19 1332     Magnesium 1 9 mg/dL     Troponin I [[id number]]  (Normal) Collected:  08/19/19 1259    Lab Status:  Final result Specimen:  Blood from Arm, Right Updated:  08/19/19 1331     Troponin I <0 02 ng/mL     Comprehensive metabolic panel [[id number]]  (Abnormal) Collected:  08/19/19 1259    Lab Status:  Final result Specimen:  Blood from Arm, Right Updated:  08/19/19 1326     Sodium 129 mmol/L      Potassium 5 3 mmol/L      Chloride 92 mmol/L      CO2 27 mmol/L      ANION GAP 10 mmol/L      BUN 18 mg/dL      Creatinine 1 36 mg/dL      Glucose 319 mg/dL      Calcium 9 2 mg/dL      AST 12 U/L      ALT 12 U/L      Alkaline Phosphatase 64 U/L      Total Protein 8 3 g/dL      Albumin 2 9 g/dL      Total Bilirubin 0 60 mg/dL      eGFR 45 ml/min/1 73sq m     Narrative:       [place] guidelines for Chronic Kidney Disease (CKD):     Stage 1 with normal or high GFR (GFR > 90 mL/min/1 73 square meters)    Stage 2 Mild CKD (GFR = 60-89 mL/min/1 73 square meters)    Stage 3A Moderate CKD (GFR = 45-59 mL/min/1 73 square meters)    Stage 3B Moderate CKD (GFR = 30-44 mL/min/1 73 square meters)    Stage 4 Severe CKD (GFR = 15-29 mL/min/1 73 square meters)    Stage 5 End Stage CKD (GFR <15 mL/min/1 73 square meters)  Note: GFR calculation is accurate only with a steady state creatinine    Protime-INR [[id number]]  (Abnormal) Collected:  08/19/19 1259    Lab Status:  Final result Specimen:  Blood from Arm, Right Updated:  08/19/19 1324     Protime 15 6 seconds      INR 1 23    APTT [[id number]]  (Normal) Collected:  08/19/19 1259    Lab Status:  Final result Specimen:  Blood from Arm, Right Updated:  08/19/19 1324     PTT 34 seconds     Blood gas, venous [[id number]]  (Abnormal) Collected:  08/19/19 1259    Lab Status:  Final result Specimen:  Blood from Arm, Right Updated:  08/19/19 1313     pH, Loy 7 396     pCO2, Oly 43 5 mm Hg      pO2, Loy 23 3 mm Hg      HCO3, Loy 26 1 mmol/L      Base Excess, Loy 0 9 mmol/L      O2 Content, Loy 8 6 ml/dL      O2 HGB, VENOUS 41 0 %     CBC and differential [[id number]]  (Abnormal) Collected:  08/19/19 1259    Lab Status:  Final result Specimen:  Blood from Arm, Right Updated:  08/19/19 1311     WBC 13 35 Thousand/uL      RBC 4 62 Million/uL      Hemoglobin 14 2 g/dL Hematocrit 43 3 %      MCV 94 fL      MCH 30 7 pg      MCHC 32 8 g/dL      RDW 13 3 %      MPV 9 2 fL      Platelets 447 Thousands/uL      nRBC 0 /100 WBCs      Neutrophils Relative 75 %      Immat GRANS % 1 %      Lymphocytes Relative 12 %      Monocytes Relative 6 %      Eosinophils Relative 6 %      Basophils Relative 0 %      Neutrophils Absolute 9 95 Thousands/µL      Immature Grans Absolute 0 09 Thousand/uL      Lymphocytes Absolute 1 55 Thousands/µL      Monocytes Absolute 0 86 Thousand/µL      Eosinophils Absolute 0 85 Thousand/µL      Basophils Absolute 0 05 Thousands/µL                  VAS lower limb arterial duplex, limited, unilateral   Final Result by Odalis Smith MD (08/19 2028)      CT foot right w contrast   Final Result by Danielle Bunch MD (08/19 1420)      1  Ulcer in the lateral aspect of the right forefoot, adjacent to the head of the right 5th metatarsal with surrounding cellulitis  2   Osteomyelitis of the head of the right 5th metatarsal    3   Adjacent cellulitis with no evidence of abscess or soft tissue gas  Workstation performed: [id number]         XR chest 2 views   Final Result by Rigo Oneill MD (08/19 1447)      No acute cardiopulmonary disease              Workstation performed: [id number]         CTA abdominal aorta and bilateral iliofemoral runoff w wo contrast    (Results Pending)   IR abdominal angiography    (Results Pending)              Procedures  Procedures  Conscious Sedation Assessment      Classification Score   ASA Scale Assessment  3-Severe systemic disease that results in functional limitation filed at 08/21/2019 1141   Mallampati Classification  Class III: soft palate, base of uvula visible - Moderate Difficulty filed at 08/21/2019 1141           ED Course           Identification of Seniors at Risk      Most Recent Value   (ISAR) Identification of Seniors at Risk   Before the illness or injury that brought you to the Emergency, did you need someone to help you on a regular basis? 1 Filed at: 08/19/2019 1144   In the last 24 hours, have you needed more help than usual?  0 Filed at: 08/19/2019 1144   Have you been hospitalized for one or more nights during the past 6 months? 0 Filed at: 08/19/2019 1144   In general, do you see well?  0 Filed at: 08/19/2019 1144   In general, do you have serious problems with your memory? 0 Filed at: 08/19/2019 1144   Do you take more than three different medications every day? 1 Filed at: 08/19/2019 1144   ISAR Score  2 Filed at: 08/19/2019 1144                          MDM  Number of Diagnoses or Management Options  Cellulitis of right foot: new and requires workup     Amount and/or Complexity of Data Reviewed  Clinical lab tests: reviewed and ordered  Tests in the radiology section of CPT®: ordered and reviewed  Decide to obtain previous medical records or to obtain history from someone other than the patient: yes  Review and summarize past medical records: yes    Patient Progress  Patient progress: stable      Disposition  Final diagnoses:   Cellulitis of right foot     Time reflects when diagnosis was documented in both MDM as applicable and the Disposition within this note     Time User Action Codes Description Comment    8/19/2019  2:06 PM Noreen Ramos Add [L03 115] Cellulitis of right foot     8/19/2019  2:32 PM Jr Soliman Add [K07 [id number],  L97 509] Diabetic foot ulcer ([facility] )     8/20/2019 10:27 AM Shaun Bone Add [I73 9] Peripheral arterial disease [facility])       ED Disposition     ED Disposition Condition Date/Time Comment    Admit Stable Mon Aug 19, 2019  2:06 PM Case was discussed with Dr Naveed Marquez and the patient's admission status was agreed to be Admission Status: inpatient status to the service of Dr Naveed Marquez           Follow-up Information    None         Current Discharge Medication List      CONTINUE these medications which have NOT CHANGED    Details   aspirin 81 MG tablet Take 1 tablet by mouth daily      BD INSULIN SYRINGE U/F 31G X 5/16" 0 5 ML MISC 2 (two) times a day  Refills: 0      cholecalciferol (VITAMIN D3) 1,000 units tablet Take 1,000 Units by mouth daily      furosemide (LASIX) 20 mg tablet TAKE ONE tablet BY MOUTH every DAY  Qty: 30 tablet, Refills: 2    Associated Diagnoses: Essential hypertension      metFORMIN (GLUCOPHAGE-XR) 500 mg 24 hr tablet TAKE FOUR tablets BY MOUTH every morning  Qty: 360 tablet, Refills: 0    Associated Diagnoses: Type 2 diabetes mellitus without complication, with long-term current use of insulin ([facility])      nitroglycerin (NITROSTAT) 0 4 mg SL tablet Place under the tongue      NOVOLIN 70/30 (70-30) 100 UNIT/ML subcutaneous injection inject 30 units subcutaneously twice a day  Qty: 10 mL, Refills: 5    Associated Diagnoses: Type 2 diabetes mellitus with stage 4 chronic kidney disease, with long-term current use of insulin (HCC)      oxybutynin (DITROPAN) 5 mg tablet TAKE ONE TABLET BY MOUTH TWICE DAILY  Qty: 60 tablet, Refills: 0    Associated Diagnoses: Benign prostatic hyperplasia, unspecified whether lower urinary tract symptoms present           No discharge procedures on file      ED Provider  Electronically Signed by           Key Ohara DO  08/21/19 1487

## 2019-08-19 NOTE — ED NOTES
1  CC-ulcer right foot  States it has been approx  2 weeks and he was told by his podiatrist to go to ER    2  Orientation status-A&OX4    3  Abnormal labs/ abnormal focused assessment/ abnormal vitals-elevated WBC, glucose, creatinine and lactic  Low sodium and chloride  4  Medications/drips-0 9% normal saline bolus    5  Last time narcotics given-n/a    6  IV lines/drains/etc-20G R AC    7  Isolation status-n/a    8  Skin-intact    9  Ambulation-independent    10  ED nurse's phone number-[phone]  [phone]   Admission related to traumatic injury-no           Kellie Atkinson RN  08/19/19 8804

## 2019-08-19 NOTE — ASSESSMENT & PLAN NOTE
Lab Results   Component Value Date    HGBA1C 7 0 (H) 05/10/2019       No results for input(s): POCGLU in the last 72 hours      Type 2 diabetes mellitus on 70/30 30 units b i d   Add sliding scale

## 2019-08-20 ENCOUNTER — APPOINTMENT (INPATIENT)
Dept: CT IMAGING | Facility: HOSPITAL | Age: 84
DRG: 617 | End: 2019-08-20
Payer: MEDICARE

## 2019-08-20 ENCOUNTER — TELEPHONE (OUTPATIENT)
Dept: VASCULAR SURGERY | Facility: CLINIC | Age: 84
End: 2019-08-20

## 2019-08-20 LAB
ALBUMIN SERPL BCP-MCNC: 2.4 G/DL (ref 3.5–5)
ALP SERPL-CCNC: 52 U/L (ref 46–116)
ALT SERPL W P-5'-P-CCNC: 8 U/L (ref 12–78)
ANION GAP SERPL CALCULATED.3IONS-SCNC: 7 MMOL/L (ref 4–13)
AST SERPL W P-5'-P-CCNC: 11 U/L (ref 5–45)
ATRIAL RATE: 78 BPM
BILIRUB SERPL-MCNC: 0.4 MG/DL (ref 0.2–1)
BUN SERPL-MCNC: 13 MG/DL (ref 5–25)
CALCIUM SERPL-MCNC: 8.1 MG/DL (ref 8.3–10.1)
CHLORIDE SERPL-SCNC: 103 MMOL/L (ref 100–108)
CO2 SERPL-SCNC: 25 MMOL/L (ref 21–32)
CREAT SERPL-MCNC: 1.06 MG/DL (ref 0.6–1.3)
ERYTHROCYTE [DISTWIDTH] IN BLOOD BY AUTOMATED COUNT: 13.2 % (ref 11.6–15.1)
GFR SERPL CREATININE-BSD FRML MDRD: 61 ML/MIN/1.73SQ M
GLUCOSE SERPL-MCNC: 127 MG/DL (ref 65–140)
GLUCOSE SERPL-MCNC: 67 MG/DL (ref 65–140)
GLUCOSE SERPL-MCNC: 70 MG/DL (ref 65–140)
GLUCOSE SERPL-MCNC: 73 MG/DL (ref 65–140)
GLUCOSE SERPL-MCNC: 78 MG/DL (ref 65–140)
HCT VFR BLD AUTO: 36.2 % (ref 36.5–49.3)
HGB BLD-MCNC: 12.2 G/DL (ref 12–17)
LACTATE SERPL-SCNC: 1 MMOL/L (ref 0.5–2)
MCH RBC QN AUTO: 31.4 PG (ref 26.8–34.3)
MCHC RBC AUTO-ENTMCNC: 33.7 G/DL (ref 31.4–37.4)
MCV RBC AUTO: 93 FL (ref 82–98)
P AXIS: 63 DEGREES
PLATELET # BLD AUTO: 359 THOUSANDS/UL (ref 149–390)
PMV BLD AUTO: 9.1 FL (ref 8.9–12.7)
POTASSIUM SERPL-SCNC: 3.8 MMOL/L (ref 3.5–5.3)
PR INTERVAL: 210 MS
PROT SERPL-MCNC: 6.8 G/DL (ref 6.4–8.2)
QRS AXIS: -76 DEGREES
QRSD INTERVAL: 178 MS
QT INTERVAL: 460 MS
QTC INTERVAL: 524 MS
RBC # BLD AUTO: 3.89 MILLION/UL (ref 3.88–5.62)
SODIUM SERPL-SCNC: 135 MMOL/L (ref 136–145)
T WAVE AXIS: 96 DEGREES
VENTRICULAR RATE: 78 BPM
WBC # BLD AUTO: 12.54 THOUSAND/UL (ref 4.31–10.16)

## 2019-08-20 PROCEDURE — 80053 COMPREHEN METABOLIC PANEL: CPT | Performed by: INTERNAL MEDICINE

## 2019-08-20 PROCEDURE — 75635 CT ANGIO ABDOMINAL ARTERIES: CPT

## 2019-08-20 PROCEDURE — 93010 ELECTROCARDIOGRAM REPORT: CPT | Performed by: INTERNAL MEDICINE

## 2019-08-20 PROCEDURE — 99233 SBSQ HOSP IP/OBS HIGH 50: CPT | Performed by: INTERNAL MEDICINE

## 2019-08-20 PROCEDURE — 82948 REAGENT STRIP/BLOOD GLUCOSE: CPT

## 2019-08-20 PROCEDURE — 99232 SBSQ HOSP IP/OBS MODERATE 35: CPT | Performed by: PHYSICIAN ASSISTANT

## 2019-08-20 PROCEDURE — 85027 COMPLETE CBC AUTOMATED: CPT | Performed by: INTERNAL MEDICINE

## 2019-08-20 PROCEDURE — 83605 ASSAY OF LACTIC ACID: CPT | Performed by: PHYSICIAN ASSISTANT

## 2019-08-20 RX ORDER — CEFAZOLIN SODIUM 2 G/50ML
2000 SOLUTION INTRAVENOUS EVERY 8 HOURS
Status: DISCONTINUED | OUTPATIENT
Start: 2019-08-20 | End: 2019-08-21

## 2019-08-20 RX ADMIN — CEFAZOLIN SODIUM 2000 MG: 2 SOLUTION INTRAVENOUS at 14:33

## 2019-08-20 RX ADMIN — INSULIN ASPART 30 UNITS: 100 INJECTION, SUSPENSION SUBCUTANEOUS at 08:16

## 2019-08-20 RX ADMIN — IOHEXOL 120 ML: 350 INJECTION, SOLUTION INTRAVENOUS at 17:09

## 2019-08-20 RX ADMIN — METRONIDAZOLE 500 MG: 500 TABLET ORAL at 06:21

## 2019-08-20 RX ADMIN — FUROSEMIDE 20 MG: 20 TABLET ORAL at 08:15

## 2019-08-20 RX ADMIN — ASPIRIN 81 MG: 81 TABLET, COATED ORAL at 08:15

## 2019-08-20 RX ADMIN — HEPARIN SODIUM 5000 UNITS: 5000 INJECTION INTRAVENOUS; SUBCUTANEOUS at 14:33

## 2019-08-20 RX ADMIN — HEPARIN SODIUM 5000 UNITS: 5000 INJECTION INTRAVENOUS; SUBCUTANEOUS at 22:09

## 2019-08-20 RX ADMIN — HEPARIN SODIUM 5000 UNITS: 5000 INJECTION INTRAVENOUS; SUBCUTANEOUS at 06:20

## 2019-08-20 RX ADMIN — CEFAZOLIN SODIUM 2000 MG: 2 SOLUTION INTRAVENOUS at 06:20

## 2019-08-20 RX ADMIN — CEFAZOLIN SODIUM 2000 MG: 2 SOLUTION INTRAVENOUS at 22:12

## 2019-08-20 RX ADMIN — METRONIDAZOLE 500 MG: 500 TABLET ORAL at 14:33

## 2019-08-20 RX ADMIN — METRONIDAZOLE 500 MG: 500 TABLET ORAL at 22:09

## 2019-08-20 NOTE — ASSESSMENT & PLAN NOTE
Reported history of BPH without active symptoms Afib    HTN (hypertension)    Overactive thyroid gland

## 2019-08-20 NOTE — PROGRESS NOTES
Progress Note - Delbert Bonilla 11/11/1928, 80 y o  male MRN: [id number]    Unit/Bed#: -Jyothi Encounter: [id number]    Primary Care Provider: Ailyn Romero DPM   Date and time admitted to hospital: 8/19/2019 11:45 AM        * Diabetic foot ulcer ([address] )  Assessment & Plan  Diabetic foot ulcer has been seen podiatrist Ailyn Romero DPM for 3-4 weeks  Wound is currently foul-smelling  X-ray images suggest this of osteomyelitis without abscess  Wound culture sent in the emergency department  ID is onboard, he will need surgery, however there is evidence of PAD  Will ask vascular to weigh in  Continue Ancef and flagyl for now  Follow up cultures  CAD (coronary artery disease)  Assessment & Plan  Coronary artery disease with history of CABG  No active chest pain  Continue aspirin    CKD stage 3 secondary to diabetes [facility])  Assessment & Plan  CKD 3 likely secondary to diabetes mellitus  Appears to be at baseline  Results from last 7 days   Lab Units 08/20/19  0644 08/19/19  1259   BUN mg/dL 13 18   CREATININE mg/dL 1 06 1 36*   EGFR ml/min/1 73sq m 61 45       Type 2 diabetes mellitus with diabetic chronic kidney disease [facility])  Assessment & Plan  Lab Results   Component Value Date    HGBA1C 7 0 (H) 05/10/2019       Recent Labs     08/19/19  1609 08/19/19  2116 08/20/19  0705   POCGLU 277* 91 78       Type 2 diabetes mellitus on 70/30 30 units b i d , continue to monitor q i d  Blood glucose and adjust accordingly    Congestive heart failure (CHF) (HCC)  Assessment & Plan  Wt Readings from Last 3 Encounters:   08/19/19 77 1 kg (170 lb)   05/28/19 80 7 kg (178 lb)   05/08/19 72 6 kg (160 lb)     Chronic diastolic CHF on furosemide    Previously on lisinopril but no longer taking    Hyponatremia  Assessment & Plan  Hyponatremia likely in setting of diuretic use, corrected with IV fluids and TSH is normal    Results from last 7 days   Lab Units 08/20/19  0644 08/19/19  1259   SODIUM mmol/L 135* 129*       Dementia  Assessment & Plan  Dementia reportedly previously on donepezil  Monitor for any signs of sundowning    SSS (sick sinus syndrome) (formerly [facility])  Assessment & Plan  Sick sinus syndrome status post pacer    Hyperlipidemia  Assessment & Plan  Hyperlipidemia previously on pravastatin  Statin likely of little benefit for this advanced age gentleman    BPH (benign prostatic hyperplasia)  Assessment & Plan  Reported history of BPH without active symptoms    Hypertension, essential  Assessment & Plan  BP is currently controlled    VTE Pharmacologic Prophylaxis:   Pharmacologic: Heparin  Mechanical VTE Prophylaxis in Place: No    Patient Centered Rounds: I have performed bedside rounds with nursing staff today  Discussions with Specialists or Other Care Team Provider: ID note reviewed, await podiatry and vascular input     Education and Discussions with Family / Patient: patient, daughter-in-law updated by phone     Time Spent for Care: 20 minutes  More than 50% of total time spent on counseling and coordination of care as described above  Current Length of Stay: 1 day(s)    Current Patient Status: Inpatient   Certification Statement: The patient will continue to require additional inpatient hospital stay due to osteomyelitis treatment    Discharge Plan: not medically stable     Code Status: Level 1 - Full Code      Subjective:   Patient seen this morning, he is feeling well  Denies fevers or chills  Denies any known history of neuropathy, reports that the ulcer on the foot has been present for about 3-4 weeks  He has seen a podiatrist twice outpatient for evaluation and management of the ulcer  Objective:     Vitals:   Temp (24hrs), Av 2 °F (36 8 °C), Min:97 9 °F (36 6 °C), Max:98 5 °F (36 9 °C)    Temp:  [97 9 °F (36 6 °C)-98 5 °F (36 9 °C)] 98 °F (36 7 °C)  HR:  [68-80] 68  Resp:  [16-19] 18  BP: (119-160)/(56-74) 119/56  SpO2:  [95 %-99 %] 97 %  Body mass index is 25 85 kg/m²       Input and Output Summary (last 24 hours): Intake/Output Summary (Last 24 hours) at 8/20/2019 1114  Last data filed at 8/20/2019 0844  Gross per 24 hour   Intake 650 ml   Output 100 ml   Net 550 ml       Physical Exam:     Physical Exam   Constitutional: Vital signs are normal  He appears well-developed and well-nourished  No distress  Cardiovascular: Normal rate, regular rhythm, S1 normal, S2 normal and normal heart sounds  Pulmonary/Chest: Effort normal and breath sounds normal  No respiratory distress  He has no wheezes  He has no rhonchi  He has no rales  Abdominal: Soft  Bowel sounds are normal  He exhibits no distension  There is no tenderness  Musculoskeletal: He exhibits no edema  Skin:   Dressing right foot, not removed present time  No obvious drainage coming through dressing  There is still fell over to the foot  Psychiatric: He has a normal mood and affect  Nursing note and vitals reviewed        Additional Data:     Labs:    Results from last 7 days   Lab Units 08/20/19  0644 08/19/19  1259   WBC Thousand/uL 12 54* 13 35*   HEMOGLOBIN g/dL 12 2 14 2   HEMATOCRIT % 36 2* 43 3   PLATELETS Thousands/uL 359 396*   NEUTROS PCT %  --  75   LYMPHS PCT %  --  12*   MONOS PCT %  --  6   EOS PCT %  --  6     Results from last 7 days   Lab Units 08/20/19  0644   SODIUM mmol/L 135*   POTASSIUM mmol/L 3 8   CHLORIDE mmol/L 103   CO2 mmol/L 25   BUN mg/dL 13   CREATININE mg/dL 1 06   ANION GAP mmol/L 7   CALCIUM mg/dL 8 1*   ALBUMIN g/dL 2 4*   TOTAL BILIRUBIN mg/dL 0 40   ALK PHOS U/L 52   ALT U/L 8*   AST U/L 11   GLUCOSE RANDOM mg/dL 73     Results from last 7 days   Lab Units 08/19/19  1259   INR  1 23*     Results from last 7 days   Lab Units 08/20/19  0705 08/19/19  2116 08/19/19  1609   POC GLUCOSE mg/dl 78 91 277*         Results from last 7 days   Lab Units 08/20/19  0644 08/19/19  1719 08/19/19  1259   LACTIC ACID mmol/L 1 0 3 4* 2 9*   PROCALCITONIN ng/ml  --   --  <0 05           * I Have Reviewed All Lab Data Listed Above  * Additional Pertinent Lab Tests Reviewed: [address] Admission Reviewed    Imaging:    Imaging Reports Reviewed Today Include: CT right foot, arterial duplex right leg  Imaging Personally Reviewed by Myself Includes:  Cellulitis and osteomyelitis right 5th metatarsal without abscess; diffuse lower extremity arterial occlusive disease with chronically occluded anterior tibial artery    Recent Cultures (last 7 days):     Results from last 7 days   Lab Units 08/19/19  1422   GRAM STAIN RESULT  Rare Polys*  3+ Gram positive cocci in pairs*  Rare Gram negative rods*  Rare Gram positive rods*       Last 24 Hours Medication List:     Current Facility-Administered Medications:  acetaminophen 650 mg Oral Q6H PRN Nela [place], DO    aspirin 81 mg Oral Daily Ashutosh Camargo, DO    cefazolin 2,000 mg Intravenous Q8H Lowell Dahl MD    docusate sodium 100 mg Oral BID PRN Nela [place], DO    furosemide 20 mg Oral Daily Ashutosh Camargo, DO    heparin (porcine) 5,000 Units Subcutaneous Q8H [facility] Ashutosh Camargo, DO    hydrALAZINE 10 mg Intravenous Q4H PRN Nela [place], DO    HYDROcodone-acetaminophen 1 tablet Oral Q4H PRN Nela [place], DO    HYDROmorphone 0 2 mg Intravenous Q4H PRN Ashutosh Camargo, DO    insulin aspart protamine-insulin aspart 30 Units Subcutaneous BID AC Ashutosh Camargo, DO    insulin lispro 1-6 Units Subcutaneous 4x Daily (AC & HS) Ashutosh Camargo, DO    magnesium hydroxide 30 mL Oral BID PRN Bill Macias MD    metroNIDAZOLE 500 mg Oral Q8H [facility] Lowell Dahl MD    ondansetron 4 mg Intravenous Q4H PRN Nela [place], DO    sodium chloride (PF) 3 mL Intravenous PRN Nela [place], DO    sodium chloride 50 mL/hr Intravenous Continuous Nela [place], DO Last Rate: 50 mL/hr (08/19/19 3643)        Today, Patient Was Seen By: Diaz Oglesby PA-C    ** Please Note: Dictation voice to text software may have been used in the creation of this document   **

## 2019-08-20 NOTE — PROGRESS NOTES
Progress Note - Infectious Disease   Alf Granados 80 y o  male MRN: [id number]  Unit/Bed#: -01 Encounter: [id number]      Impression/Plan:  1  Diabetic foot ulcer with cellulitis and gangrene  Patient presents at this time with progression of the previously diagnosed diabetic foot ulcer with cellulitis and gangrene  Imaging concerning for osteomyelitis  Patient is fortunately hemodynamically stable  No other prior culture data in our system  Continue patient on high-dose Ancef with Flagyl  Continue to trend fever curve/vitals  Repeat CBC/chemistry tomorrow  Serial foot exams  Podiatry evaluation appreciated  Follow up pending cultures  Additional care as per primary  Additional interventions pending clinical course     2  Fifth metatarsal osteomyelitis  Patient noted with progressive diabetic foot ulcer and now osteomyelitis on imaging  Patient has failed outpatient antibiotic therapy possibly with amoxicillin  Plan for surgery with Podiatry tomorrow  Continue high-dose Ancef with Flagyl  Continue to trend fever curve/vitals  Serial exams  Podiatry evaluation appreciated  Additional care as per primary     3  Leukocytosis  Patient with elevated white blood cell count on admission which is likely due to the above  No other obvious sources on exam   Repeat CBC tomorrow  Additional care as above     4  Chronic kidney disease  Patient with underlying chronic kidney disease  Creatinine appears to be close to baseline  Ancef increased to full dose today  No dose adjustment for Flagyl  Repeat chemistry tomorrow  Will further dose adjust antibiotics as needed     5  Poorly controlled diabetes  Patient noted with hemoglobin A1c of 7  Unfortunately this is risk factor for progressing wounds  Glucose management as per primary   Antibiotics as above      6  Sick sinus syndrome with pacemaker  No acute issues noted at pacemaker site  Follow up pending blood cultures      7  Reported dementia  Patient seems to have issues related to time  He may have underlying dementia  Currently is fully independent and lives on his own  Placement/service issues as per primary service  Recommend Case Management follow-up    Consider rehab given poor compliance with surgical shoe per Podiatry note     Above plan discussed in detail with the patient and his family at bedside      ID consult service will continue to follow         Antibiotics:  Ancef/Flagyl 2    24 hour events:  No acute events noted overnight on chart review  Patient is currently afebrile  White blood cell count 12 5  Wound cultures and blood cultures pending  No new imaging overnight  Patient's other labs are stable    Subjective:  Patient has no fever, chills, sweats; no nausea, vomiting, diarrhea; no cough, shortness of breath; no pain  No new symptoms  He is unhappy at this time about hearing the possibility of surgery but understands that he needs it  Objective:  Vitals:  Temp:  [98 °F (36 7 °C)-98 3 °F (36 8 °C)] 98 °F (36 7 °C)  HR:  [68-78] 68  Resp:  [18] 18  BP: (119-122)/(56-59) 119/56  SpO2:  [95 %-97 %] 97 %  Temp (24hrs), Av 2 °F (36 8 °C), Min:98 °F (36 7 °C), Max:98 3 °F (36 8 °C)  Current: Temperature: 98 °F (36 7 °C)    Physical Exam:   General Appearance:  Alert, interactive, nontoxic, no acute distress  Throat: Oropharynx moist without lesions  Lungs:   Clear to auscultation bilaterally; no wheezes, rhonchi or rales; respirations unlabored on room air   Heart:  RRR; no murmur, rub or gallop   Abdomen:   Soft, non-tender, non-distended, positive bowel sounds  Extremities: No clubbing, cyanosis or edema   Skin: No new rashes or lesions  No new draining wounds noted  Previously noted lesion on the patient's right foot dried out with Betadine  Changes of Dry gangrene present         Labs, Imaging, & Other studies:   All pertinent labs and imaging studies were personally reviewed  Results from last 7 days   Lab Units 08/20/19  0644 08/19/19  1259   WBC Thousand/uL 12 54* 13 35*   HEMOGLOBIN g/dL 12 2 14 2   PLATELETS Thousands/uL 359 396*     Results from last 7 days   Lab Units 08/20/19  0644 08/19/19  1259   POTASSIUM mmol/L 3 8 5 3   CHLORIDE mmol/L 103 92*   CO2 mmol/L 25 27   BUN mg/dL 13 18   CREATININE mg/dL 1 06 1 36*   EGFR ml/min/1 73sq m 61 45   CALCIUM mg/dL 8 1* 9 2   AST U/L 11 12   ALT U/L 8* 12   ALK PHOS U/L 52 64     Results from last 7 days   Lab Units 08/19/19  1422   GRAM STAIN RESULT  Rare Polys*  3+ Gram positive cocci in pairs*  Rare Gram negative rods*  Rare Gram positive rods*   WOUND CULTURE  3+ Growth of Staphylococcus aureus*

## 2019-08-20 NOTE — NURSING NOTE
Notified [facility] of pt's refusal to allow another lactic acid blood draw  Will continue to stress the importance of allowing staff to perform blood draws         Alecia Klein RN

## 2019-08-20 NOTE — TELEPHONE ENCOUNTER
Incoming Vascular Consult Telephone Note    selena from [facility] called with (consult urgency):routine    Return Phone Number: Iam Christianson     [phone]    --01 (-01) --01 (-01)     Provider Requesting:Ximena Rain PA-C    Diagnosis for Consult:pad, diabetic foot ulcer    dionte seay

## 2019-08-20 NOTE — ASSESSMENT & PLAN NOTE
Lab Results   Component Value Date    HGBA1C 7 0 (H) 05/10/2019       Recent Labs     08/19/19  1609 08/19/19  2116 08/20/19  0705   POCGLU 277* 91 78       Type 2 diabetes mellitus on 70/30 30 units b i d , continue to monitor q i d   Blood glucose and adjust accordingly

## 2019-08-20 NOTE — SOCIAL WORK
Pt not yet medically stable per [facility]  Pt awaiting podiatry and vascular surgery consults  Pt will then be seen by PT for recommendations  Pt would like to return home with hha through [facility] on discharge  May need STR vs  HHC pending hospital course  CM will continue to follow

## 2019-08-20 NOTE — ASSESSMENT & PLAN NOTE
Diabetic foot ulcer has been seen podiatrist Adrianne Adorno DPM for 3-4 weeks  Wound is currently foul-smelling  X-ray images suggest this of osteomyelitis without abscess  Wound culture sent in the emergency department  ID is onboard, he will need surgery, however there is evidence of PAD  Will ask vascular to weigh in  Continue Ancef and flagyl for now  Follow up cultures

## 2019-08-20 NOTE — PHYSICAL THERAPY NOTE
Physical Therapy Cancellation Note    PT orders received + chart review completed  Pt going to OR 8/21/19 for R 5th met head rsn  Will follow up post op once [facility] orders available       Azael Canela, PT, DPT

## 2019-08-20 NOTE — ASSESSMENT & PLAN NOTE
CKD 3 likely secondary to diabetes mellitus  Appears to be at baseline       Results from last 7 days   Lab Units 08/20/19  0644 08/19/19  1259   BUN mg/dL 13 18   CREATININE mg/dL 1 06 1 36*   EGFR ml/min/1 73sq m 61 45

## 2019-08-20 NOTE — CONSULTS
Consultation - David Clemons 80 y o  male MRN: [id number]  Unit/Bed#: -01 Encounter: [id number]        History of Present Illness   Physician Requesting Consult: David Emerson MD  Reason for Consult / Principal Problem:  Right foot gangrene and 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  HPI: Soto Felix is a 80y o  year old male who presents with right foot 5th met head gangrene and osteomyelitis  Patient has been seen in the office for possible weeks which started out as a necrotic ulcer which was progressively getting worse  The patient was advised several times to wear surgical shoe and several times to come to the hospital but he refused prior to this  Yesterday he was advised again to consider coming to the hospital and he agreed    He is experiencing considerable amount of pain while walking and that probably what made him agree to coming to the hospital    Consults    Review of Systems    Historical Information   Past Medical History:   Diagnosis Date    BPH (benign prostatic hyperplasia)     CAD (coronary artery disease)     Congest[facility]F) ([facility] )     RESOLVED:     Dementia     Diabetes mellitus ([place] [address] )     History of colonoscopy     Hyperlipidemia     SSS (sick sinus syndrome) ([place] [address] )      Past Surgical History:   Procedure Laterality Date    COLONOSCOPY      RESOLVED: 2016    CORONARY ARTERY BYPASS GRAFT      FOUR-VESSEL BYPASS GRAFT INCLUDING INTERNAL MAMMARY TO LAD; RESOLVED: SEP 2010     Social History   Social History     Substance and Sexual Activity   Alcohol Use Yes    Comment: RARELY      Social History     Substance and Sexual Activity   Drug Use No     Social History     Tobacco Use   Smoking Status Former Smoker    Packs/day:  00    Years: 25 00    Pack years: 25 00    Last attempt to quit:     Years since quittin 6   Smokeless Tobacco Never Used     Family History:   Family History   Problem Relation Age of Onset    No Known Problems Mother        Meds/Allergies   all current active meds have been reviewed, current meds:   Current Facility-Administered Medications   Medication Dose Route Frequency    acetaminophen (TYLENOL) tablet 650 mg  650 mg Oral Q6H PRN    aspirin (ECOTRIN LOW STRENGTH) EC tablet 81 mg  81 mg Oral Daily    ceFAZolin (ANCEF) IVPB (premix) 2,000 mg  2,000 mg Intravenous Q8H    docusate sodium (COLACE) capsule 100 mg  100 mg Oral BID PRN    furosemide (LASIX) tablet 20 mg  20 mg Oral Daily    heparin (porcine) subcutaneous injection 5,000 Units  5,000 Units Subcutaneous Q8H [facility]    hydrALAZINE (APRESOLINE) injection 10 mg  10 mg Intravenous Q4H PRN    HYDROcodone-acetaminophen (NORCO) 5-325 mg per tablet 1 tablet  1 tablet Oral Q4H PRN    HYDROmorphone (DILAUDID) injection 0 2 mg  0 2 mg Intravenous Q4H PRN    insulin aspart protamine-insulin aspart (NovoLOG 70/30) 100 units/mL subcutaneous injection 30 Units  30 Units Subcutaneous BID AC    insulin lispro (HumaLOG) 100 units/mL subcutaneous injection 1-6 Units  1-6 Units Subcutaneous 4x Daily (AC & HS)    magnesium hydroxide (MILK OF MAGNESIA) 400 mg/5 mL oral suspension 30 mL  30 mL Oral BID PRN    metroNIDAZOLE (FLAGYL) tablet 500 mg  500 mg Oral Q8H [facility]    ondansetron (ZOFRAN) injection 4 mg  4 mg Intravenous Q4H PRN    sodium chloride (PF) 0 9 % injection 3 mL  3 mL Intravenous PRN    sodium chloride 0 9 % infusion  50 mL/hr Intravenous Continuous    and PTA meds:    Medications Prior to Admission   Medication    aspirin 81 MG tablet    BD INSULIN SYRINGE U/F 31G X 5/16" 0 5 ML MISC    cholecalciferol (VITAMIN D3) 1,000 units tablet    furosemide (LASIX) 20 mg tablet    metFORMIN (GLUCOPHAGE-XR) 500 mg 24 hr tablet    nitroglycerin (NITROSTAT) 0 4 mg SL tablet    NOVOLIN 70/30 (70-30) 100 UNIT/ML subcutaneous injection    oxybutynin (DITROPAN) 5 mg tablet     No Known Allergies    Objective   Vitals: Blood pressure 119/56, pulse 68, temperature 98 °F (36 7 °C), resp  rate 18, height 5' 8" (1 727 m), weight 77 1 kg (170 lb), SpO2 97 %  ,Body mass index is 25 85 kg/m²  Intake/Output Summary (Last 24 hours) at 8/20/2019 1203  Last data filed at 8/20/2019 0844  Gross per 24 hour   Intake 650 ml   Output 100 ml   Net 550 ml     I/O last 24 hours: In: 0 [P O :600; IV Piggyback:50]  Out: 100 [Urine:100]    Invasive Devices     Peripheral Intravenous Line            Peripheral IV 08/19/19 Right Antecubital less than 1 day                Physical Exam    Right foot 5th met head a gangrene with a full-thickness ulcer that is down to bone there is edema erythema and malodor to the lateral aspect of the right foot  CT scan revealed osteomyelitis of the right 5th met head  There is no purulence but definitely sign of PVD right lower extremity  Lab Results: I have personally reviewed pertinent lab results    CBC:   Lab Results   Component Value Date    WBC 12 54 (H) 08/20/2019    HGB 12 2 08/20/2019    HCT 36 2 (L) 08/20/2019    MCV 93 08/20/2019     08/20/2019    MCH 31 4 08/20/2019    MCHC 33 7 08/20/2019    RDW 13 2 08/20/2019    MPV 9 1 08/20/2019    NRBC 0 08/19/2019     CMP:   Lab Results   Component Value Date     (L) 12/22/2015     08/20/2019     12/22/2015    CO2 25 08/20/2019    CO2 27 12/22/2015    ANIONGAP 8 12/22/2015    BUN 13 08/20/2019    BUN 26 (H) 12/22/2015    CREATININE 1 06 08/20/2019    CREATININE 1 17 12/22/2015    GLUCOSE 267 (H) 12/22/2015    CALCIUM 8 1 (L) 08/20/2019    CALCIUM 9 2 12/22/2015    AST 11 08/20/2019    AST 11 10/19/2015    ALT 8 (L) 08/20/2019    ALT 24 10/19/2015    ALKPHOS 52 08/20/2019    ALKPHOS 56 10/19/2015    PROT 7 8 10/19/2015    BILITOT 0 71 10/19/2015    EGFR 61 08/20/2019     PT/INR:   Lab Results   Component Value Date    INR 1 23 (H) 08/19/2019     ESR:   Lab Results   Component Value Date    ESR 34 (H) 11/10/2016     CRP: No results found for: CRP  Imaging Studies: I have personally reviewed pertinent reports  EKG, Pathology, and Other Studies: I have personally reviewed pertinent reports  VTE Prophylaxis: Sequential compression device Keyanna Miller)     Assessment/Plan     Assessment:  Right foot gangrene and 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  PVD right lower extremity  Plan: For debridement and resection of 5th met head right foot tomorrow 7:30 a m  Patient is to be NPO past midnight tonight  Code Status: Level 1 - Full Code  Advance Directive and Living Will:      Power of : Yes  POLST:      Counseling / Coordination of Care  Total floor / unit time spent today 60 minutes  Greater than 50% of total time was spent with the patient and / or family counseling and / or coordination of care  A description of the counseling / coordination of care:  Right foot gangrene and 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

## 2019-08-20 NOTE — ASSESSMENT & PLAN NOTE
Hyponatremia likely in setting of diuretic use, corrected with IV fluids and TSH is normal    Results from last 7 days   Lab Units 08/20/19  0644 08/19/19  1259   SODIUM mmol/L 135* 129*

## 2019-08-20 NOTE — UTILIZATION REVIEW
Initial Clinical Review    Admission: Date/Time/Statement: 8/19/19 @ 1407     Orders Placed This Encounter   Procedures    Inpatient Admission     Standing Status:   Standing     Number of Occurrences:   1     Order Specific Question:   Admitting Physician     Answer:   Terrance Claros [[id number]]     Order Specific Question:   Level of Care     Answer:   Med Surg [[id number]]     Order Specific Question:   Estimated length of stay     Answer:   More than 2 Midnights     Order Specific Question:   Certification     Answer:   I certify that inpatient services are medically necessary for this patient for a duration of greater than two midnights  See H&P and MD Progress Notes for additional information about the patient's course of treatment  ED Arrival Information     Expected Arrival Acuity Means of Arrival Escorted By Service Admission Type    - 8/19/2019 11:36 Urgent Walk-In Family Member Hospitalist Urgent    Arrival Complaint    Foot pain        Chief Complaint   Patient presents with    Foot Ulcer     pt has hx of diabetes and statesto have had "opening on outside of right foot for past month that has progressively gotten worse" pt was at podiatrist this morning and was informed to come to ED to be evaluated  area is open, red swollen, slough noted     Assessment/Plan: 80 y o  demented male with a history of diabetes mellitus, coronary disease status post CABG, and sick sinus syndrome who presents with worsening right foot ulcer  Patient does not remember the exact events but does note that several weeks ago had found right lateral foot ulcer  He has seen a podiatrist on several occasions and has been prescribed a salve as well as oral antibiotics but does not know which one  He denies any systemic symptoms such as fevers or chills  When he went to see his podiatrist today Lindsey Bryant DPM, he was referred to the emergency department where imaging is concerning for osteomyelitis    At this time internal medicine was asked to admit the patient  Patient has mild pain mainly with weight-bearing  He lives alone as his wife passed away last year  He has good exercise tolerance and is able to ambulate with rolling walker or cane without any angina symptoms  Diabetic foot ulcer ([address] )  Assessment & Plan  Diabetic foot ulcer has been seen podiatrist Yani Cosme DPM for 3-4 weeks  Wound is currently foul-smelling  X-ray images suggest this of osteomyelitis without abscess  Wound culture sent in the emergency department  Will start cefazolin/metronidazole and have both Podiatry and Infectious Disease evaluate  Given history of coronary disease and diabetes mellitus, will also check lower extremity arterial duplex  Should the patient knee surgery he is of acceptable risk as his medical comorbidities are stable and further testing will not decrease this risk  Hyponatremia  Assessment & Plan  Hyponatremia likely in setting of diuretic use  For completeness at [facility] to available labs to rule out hypothyroidism as cause          Results from last 7 days   Lab Units 08/19/19  1259   SODIUM mmol/L 129*      CKD stage 3 secondary to diabetes [facility])  Assessment & Plan  CKD 3 likely secondary to diabetes mellitus  Appears to be at baseline           Results from last 7 days   Lab Units 08/19/19  1259   BUN mg/dL 18   CREATININE mg/dL 1 36*   EGFR ml/min/1 73sq m 39       BPH (benign prostatic hyperplasia)  Assessment & Plan  Reported history of BPH without active symptoms   CAD (coronary artery disease)  Assessment & Plan  Coronary artery disease with history of CABG  No active chest pain  Continue aspirin   Dementia  Assessment & Plan  Dementia reportedly previously on donepezil    Monitor for any signs of sundowning   SSS (sick sinus syndrome) [facility])  Assessment & Plan  Sick sinus syndrome status post pacer   Congestive heart failure (CHF) ([facility])  Assessment & Plan  Wt Readings from Last 3 Encounters:   08/19/19 77 1 kg (170 lb) 05/28/19 80 7 kg (178 lb)   05/08/19 72 6 kg (160 lb)      Chronic diastolic CHF on furosemide  Previously on lisinopril but no longer taking   Hyperlipidemia  Assessment & Plan  Hyperlipidemia previously on pravastatin  Statin likely of little benefit for this advanced age gentleman     Type 2 diabetes mellitus with diabetic chronic kidney disease ([facility] [address] )  Assessment & Plan        Lab Results   Component Value Date     HGBA1C 7 0 (H) 05/10/2019    No results for input(s): POCGLU in the last 72 hours    Type 2 diabetes mellitus on 70/30 30 units b i d   Add sliding scale   VTE Prophylaxis: Heparin  Code Status: Level 1 - Full Code  Anticipated Length of Stay:  Patient will be admitted on an Inpatient basis with an anticipated length of stay of  greater than 2 midnights  ID consult  8/19  IMPRESSION & RECOMMENDATIONS:   1  Diabetic foot ulcer with cellulitis  Patient presents at this time with progression of the previously diagnosed diabetic foot ulcer with cellulitis  Imaging concerning for osteomyelitis  Patient is fortunately hemodynamically stable  No other prior culture data in our system  Continue patient on renally dosed Ancef with Flagyl  Continue to trend fever curve/vitals  Repeat CBC/chemistry tomorrow  Serial foot exams  Podiatry evaluation pending  Vascular studies pending  Follow up pending cultures  Additional care as per primary  Additional interventions pending clinical course   2  Fifth metatarsal osteomyelitis  Patient noted with progressive diabetic foot ulcer and now osteomyelitis on imaging  Patient has failed outpatient antibiotic therapy possibly with amoxicillin  Continue renally dosed Ancef with Flagyl  Continue to trend fever curve/vitals  Serial exams  Podiatry evaluation pending  Additional care as per primary   3  Leukocytosis  Patient with elevated white blood cell count on admission which is likely due to the above    No other obvious sources on exam   Repeat CBC tomorrow  Additional care as above   4  Chronic kidney disease  Patient with underlying chronic kidney disease  Creatinine appears to be close to baseline  Ancef currently dose reduced  No dose adjustment for Flagyl  Repeat chemistry tomorrow  Will further dose adjust antibiotics as needed   5  Poorly controlled diabetes  Patient noted with hemoglobin A1c of 7  Unfortunately this is risk factor for progressing wounds  Glucose management as per primary   Antibiotics as above    6  Sick sinus syndrome with pacemaker  No acute issues noted at pacemaker site  Follow up pending blood cultures    7  Reported dementia  Patient seems to have issues related to time  He may have underlying dementia  Currently is fully independent and lives on his own  Placement/service issues as per primary service  Recommend Case Management follow-up       8/20 Podiatry consult   Assessment:  Right foot gangrene and 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  PVD right lower extremity  Plan: For debridement and resection of 5th met head right foot tomorrow 7:30 a m  Patient is to be NPO past midnight tonight  ED Triage Vitals [08/19/19 1138]   Temperature Pulse Respirations Blood Pressure SpO2   98 5 °F (36 9 °C) 80 19 160/74 98 %      Temp Source Heart Rate Source Patient Position - Orthostatic VS BP Location FiO2 (%)   Oral Monitor Sitting Left arm --      Pain Score       8        Wt Readings from Last 1 Encounters:   08/20/19 77 1 kg (170 lb)     Additional Vital Signs:   08/20/19 07:40:54  98 °F (36 7 °C)  68  18  119/56  77  97 %       08/19/19 23:17:51  98 3 °F (36 8 °C)  78  18  122/59  80  95 %       08/19/19 1627            96 %  None (Room air)     08/19/19 14:54:43  97 9 °F (36 6 °C)  72  18  152/68  96  99 %       08/19/19 1315    76  16  133/60    98 %  None (Room          Pertinent Labs/Diagnostic Test Results:   8/19 CXR No acute cardiopulmonary disease  8/19 CT R foot    1    Ulcer in the lateral aspect of the right forefoot, adjacent to the head of the right 5th metatarsal with surrounding cellulitis  2   Osteomyelitis of the head of the right 5th metatarsal   3   Adjacent cellulitis with no evidence of abscess or soft tissue gas    8/19 venous duplex - wnl   8/19 EKG- atrial sensed ventricular paced rhythm  Results from last 7 days   Lab Units 08/20/19  0644 08/19/19  1259   WBC Thousand/uL 12 54* 13 35*   HEMOGLOBIN g/dL 12 2 14 2   HEMATOCRIT % 36 2* 43 3   PLATELETS Thousands/uL 359 396*   NEUTROS ABS Thousands/µL  --  9 95*     Results from last 7 days   Lab Units 08/20/19  0644 08/19/19  1259   SODIUM mmol/L 135* 129*   POTASSIUM mmol/L 3 8 5 3   CHLORIDE mmol/L 103 92*   CO2 mmol/L 25 27   ANION GAP mmol/L 7 10   BUN mg/dL 13 18   CREATININE mg/dL 1 06 1 36*   EGFR ml/min/1 73sq m 61 45   CALCIUM mg/dL 8 1* 9 2   MAGNESIUM mg/dL  --  1 9     Results from last 7 days   Lab Units 08/20/19  0644 08/19/19  1259   AST U/L 11 12   ALT U/L 8* 12   ALK PHOS U/L 52 64   TOTAL PROTEIN g/dL 6 8 8 3*   ALBUMIN g/dL 2 4* 2 9*   TOTAL BILIRUBIN mg/dL 0 40 0 60     Results from last 7 days   Lab Units 08/20/19  1201 08/20/19  0705 08/19/19  2116 08/19/19  1609   POC GLUCOSE mg/dl 70 78 91 277*     Results from last 7 days   Lab Units 08/20/19  0644 08/19/19  1259   GLUCOSE RANDOM mg/dL 73 319*     Results from last 7 days   Lab Units 08/19/19  1259   PH GABRIEL  7 396   PCO2 GABRIEL mm Hg 43 5   PO2 GABRIEL mm Hg 23 3*   HCO3 GABRIEL mmol/L 26 1   BASE EXC GABRIEL mmol/L 0 9   O2 CONTENT GABRIEL ml/dL 8 6   O2 HGB, VENOUS % 41 0*     Results from last 7 days   Lab Units 08/19/19  1259   TROPONIN I ng/mL <0 02     Results from last 7 days   Lab Units 08/19/19  1259   PROTIME seconds 15 6*   INR  1 23*   PTT seconds 34     Results from last 7 days   Lab Units 08/19/19  1259   TSH 3RD GENERATON uIU/mL 2 068     Results from last 7 days   Lab Units 08/19/19  1259   PROCALCITONIN ng/ml <0 05     Results from last 7 days   Lab Units 08/20/19  6376 08/19/19  1719 08/19/19  1259   LACTIC ACID mmol/L 1 0 3 4* 2 9*     Results from last 7 days   Lab Units 08/19/19  1259   NT-PRO BNP pg/mL 430     Results from last 7 days   Lab Units 08/19/19  1259   LIPASE u/L 57*     Results from last 7 days   Lab Units 08/19/19  1627   CLARITY UA  Clear   COLOR UA  Yellow   SPEC GRAV UA  <=1 005   PH UA  7 5   GLUCOSE UA mg/dl 250 (1/4%)*   KETONES UA mg/dl Negative   BLOOD UA  Negative   PROTEIN UA mg/dl Negative   NITRITE UA  Negative   BILIRUBIN UA  Negative   UROBILINOGEN UA E U /dl 0 2   LEUKOCYTES UA  Negative     Results from last 7 days   Lab Units 08/19/19  1422   GRAM STAIN RESULT  Rare Polys*  3+ Gram positive cocci in pairs*  Rare Gram negative rods*  Rare Gram positive rods*   WOUND CULTURE  3+ Growth of Staphylococcus aureus*     ED Treatment:   Medication Administration from 08/19/2019 1136 to 08/19/2019 1447       Date/Time Order Dose Route Action     08/19/2019 1304 piperacillin-tazobactam (ZOSYN) 3 375 g in sodium chloride 0 9 % 50 mL IVPB 3 375 g Intravenous New Bag     08/19/2019 1400 sodium chloride 0 9 % bolus 1,000 mL 1,000 mL Intravenous New Bag        Past Medical History:   Diagnosis Date    BPH (benign prostatic hyperplasia)     CAD (coronary artery disease)     Congestive heart failure (CHF) ([place] [address] )     RESOLVED: [id number]    Dementia     Diabetes mellitus ([place] [address] )     History of colonoscopy     Hyperlipidemia     SSS (sick sinus syndrome) ([place] [address] )      Present on Admission:   Hypertension, essential   Hyperlipidemia   SSS (sick sinus syndrome) ([place] [address] )   CAD (coronary artery disease)   BPH (benign prostatic hyperplasia)   Dementia   Diabetic foot ulcer (HCC)   Congestive heart failure (CHF) ([place] [address] )   CKD stage 3 secondary to diabetes ([place] [address] )   Type 2 diabetes mellitus with diabetic chronic kidney disease ([place] [address] )   Hyponatremia      Admitting Diagnosis: Foot pain [M79 673]  Diabetic foot ulcer ([place] [address] ) [H09 734, L97 509]  Cellulitis of right foot [Q42 678]  Age/Sex: 80 y o  male  Admission Orders:    Current Facility-Administered Medications:  acetaminophen 650 mg Oral Q6H PRN     aspirin 81 mg Oral Daily     cefazolin 2,000 mg Intravenous Q8H     docusate sodium 100 mg Oral BID PRN     furosemide 20 mg Oral Daily     heparin (porcine) 5,000 Units Subcutaneous Q8H [facility]     hydrALAZINE 10 mg Intravenous Q4H PRN     HYDROcodone-acetaminophen 1 tablet Oral Q4H PRN     HYDROmorphone 0 2 mg Intravenous Q4H PRN     insulin aspart protamine-insulin aspart 30 Units Subcutaneous BID AC     insulin lispro 1-6 Units Subcutaneous 4x Daily (AC & HS)     magnesium hydroxide 30 mL Oral BID PRN     metroNIDAZOLE 500 mg Oral Q8H FRANCES     ondansetron 4 mg Intravenous Q4H PRN x1    sodium chloride (PF) 3 mL Intravenous PRN     sodium chloride 50 mL/hr Intravenous Continuous       NPO   Fingerstick ac and hs   PT eval   Up w/ assist    IP CONSULT TO INFECTIOUS DISEASES  IP CONSULT TO PODIATRY  IP CONSULT TO VASCULAR SURGERY    Network Utilization Review Department  Phone: [phone]; Fax [phone]  [email or web address]  ATTENTION: Please call with any questions or concerns to [phone]  and carefully listen to the prompts so that you are directed to the right person  Send all requests for admission clinical reviews, approved or denied determinations and any other requests to fax [phone]   All voicemails are confidential

## 2019-08-21 ENCOUNTER — APPOINTMENT (INPATIENT)
Dept: INTERVENTIONAL RADIOLOGY/VASCULAR | Facility: HOSPITAL | Age: 84
DRG: 617 | End: 2019-08-21
Payer: MEDICARE

## 2019-08-21 LAB
ANION GAP SERPL CALCULATED.3IONS-SCNC: 8 MMOL/L (ref 4–13)
BASOPHILS # BLD AUTO: 0.04 THOUSANDS/ΜL (ref 0–0.1)
BASOPHILS NFR BLD AUTO: 0 % (ref 0–1)
BUN SERPL-MCNC: 12 MG/DL (ref 5–25)
CALCIUM SERPL-MCNC: 8.4 MG/DL (ref 8.3–10.1)
CHLORIDE SERPL-SCNC: 99 MMOL/L (ref 100–108)
CO2 SERPL-SCNC: 27 MMOL/L (ref 21–32)
CREAT SERPL-MCNC: 1.14 MG/DL (ref 0.6–1.3)
EOSINOPHIL # BLD AUTO: 1.12 THOUSAND/ΜL (ref 0–0.61)
EOSINOPHIL NFR BLD AUTO: 10 % (ref 0–6)
ERYTHROCYTE [DISTWIDTH] IN BLOOD BY AUTOMATED COUNT: 13.2 % (ref 11.6–15.1)
GFR SERPL CREATININE-BSD FRML MDRD: 56 ML/MIN/1.73SQ M
GLUCOSE SERPL-MCNC: 155 MG/DL (ref 65–140)
GLUCOSE SERPL-MCNC: 161 MG/DL (ref 65–140)
GLUCOSE SERPL-MCNC: 188 MG/DL (ref 65–140)
GLUCOSE SERPL-MCNC: 214 MG/DL (ref 65–140)
GLUCOSE SERPL-MCNC: 247 MG/DL (ref 65–140)
HCT VFR BLD AUTO: 37.3 % (ref 36.5–49.3)
HGB BLD-MCNC: 12.2 G/DL (ref 12–17)
IMM GRANULOCYTES # BLD AUTO: 0.1 THOUSAND/UL (ref 0–0.2)
IMM GRANULOCYTES NFR BLD AUTO: 1 % (ref 0–2)
LYMPHOCYTES # BLD AUTO: 1.54 THOUSANDS/ΜL (ref 0.6–4.47)
LYMPHOCYTES NFR BLD AUTO: 14 % (ref 14–44)
MCH RBC QN AUTO: 30.7 PG (ref 26.8–34.3)
MCHC RBC AUTO-ENTMCNC: 32.7 G/DL (ref 31.4–37.4)
MCV RBC AUTO: 94 FL (ref 82–98)
MONOCYTES # BLD AUTO: 1.04 THOUSAND/ΜL (ref 0.17–1.22)
MONOCYTES NFR BLD AUTO: 10 % (ref 4–12)
NEUTROPHILS # BLD AUTO: 7.09 THOUSANDS/ΜL (ref 1.85–7.62)
NEUTS SEG NFR BLD AUTO: 65 % (ref 43–75)
NRBC BLD AUTO-RTO: 0 /100 WBCS
PLATELET # BLD AUTO: 377 THOUSANDS/UL (ref 149–390)
PMV BLD AUTO: 9.3 FL (ref 8.9–12.7)
POTASSIUM SERPL-SCNC: 4.4 MMOL/L (ref 3.5–5.3)
RBC # BLD AUTO: 3.98 MILLION/UL (ref 3.88–5.62)
SODIUM SERPL-SCNC: 134 MMOL/L (ref 136–145)
WBC # BLD AUTO: 10.93 THOUSAND/UL (ref 4.31–10.16)

## 2019-08-21 PROCEDURE — C1769 GUIDE WIRE: HCPCS

## 2019-08-21 PROCEDURE — 37228 PR REVASCULARIZE TIBIAL/PERON ARTERY,ANGIOPLASTY INITIAL: CPT | Performed by: RADIOLOGY

## 2019-08-21 PROCEDURE — 82948 REAGENT STRIP/BLOOD GLUCOSE: CPT

## 2019-08-21 PROCEDURE — C1887 CATHETER, GUIDING: HCPCS

## 2019-08-21 PROCEDURE — C1894 INTRO/SHEATH, NON-LASER: HCPCS

## 2019-08-21 PROCEDURE — 99152 MOD SED SAME PHYS/QHP 5/>YRS: CPT | Performed by: RADIOLOGY

## 2019-08-21 PROCEDURE — 37228 HB TIB/PER REVASC W/TLA: CPT

## 2019-08-21 PROCEDURE — 85025 COMPLETE CBC W/AUTO DIFF WBC: CPT | Performed by: INTERNAL MEDICINE

## 2019-08-21 PROCEDURE — 99233 SBSQ HOSP IP/OBS HIGH 50: CPT | Performed by: INTERNAL MEDICINE

## 2019-08-21 PROCEDURE — 99152 MOD SED SAME PHYS/QHP 5/>YRS: CPT

## 2019-08-21 PROCEDURE — C1725 CATH, TRANSLUMIN NON-LASER: HCPCS

## 2019-08-21 PROCEDURE — 99232 SBSQ HOSP IP/OBS MODERATE 35: CPT | Performed by: PHYSICIAN ASSISTANT

## 2019-08-21 PROCEDURE — B41DYZZ FLUOROSCOPY OF AORTA AND BILATERAL LOWER EXTREMITY ARTERIES USING OTHER CONTRAST: ICD-10-PCS | Performed by: RADIOLOGY

## 2019-08-21 PROCEDURE — 75625 CONTRAST EXAM ABDOMINL AORTA: CPT

## 2019-08-21 PROCEDURE — 80048 BASIC METABOLIC PNL TOTAL CA: CPT | Performed by: INTERNAL MEDICINE

## 2019-08-21 PROCEDURE — 99153 MOD SED SAME PHYS/QHP EA: CPT

## 2019-08-21 PROCEDURE — 047R3ZZ DILATION OF RIGHT POSTERIOR TIBIAL ARTERY, PERCUTANEOUS APPROACH: ICD-10-PCS | Performed by: RADIOLOGY

## 2019-08-21 PROCEDURE — 36247 INS CATH ABD/L-EXT ART 3RD: CPT

## 2019-08-21 PROCEDURE — 75710 ARTERY X-RAYS ARM/LEG: CPT

## 2019-08-21 PROCEDURE — NC001 PR NO CHARGE: Performed by: SURGERY

## 2019-08-21 RX ORDER — HEPARIN SODIUM 1000 [USP'U]/ML
INJECTION, SOLUTION INTRAVENOUS; SUBCUTANEOUS CODE/TRAUMA/SEDATION MEDICATION
Status: COMPLETED | OUTPATIENT
Start: 2019-08-21 | End: 2019-08-21

## 2019-08-21 RX ORDER — MIDAZOLAM HYDROCHLORIDE 1 MG/ML
INJECTION INTRAMUSCULAR; INTRAVENOUS CODE/TRAUMA/SEDATION MEDICATION
Status: COMPLETED | OUTPATIENT
Start: 2019-08-21 | End: 2019-08-21

## 2019-08-21 RX ORDER — DOCUSATE SODIUM 100 MG/1
100 CAPSULE, LIQUID FILLED ORAL 2 TIMES DAILY PRN
Status: DISCONTINUED | OUTPATIENT
Start: 2019-08-21 | End: 2019-08-27 | Stop reason: HOSPADM

## 2019-08-21 RX ORDER — LIDOCAINE HYDROCHLORIDE 10 MG/ML
INJECTION, SOLUTION INFILTRATION; PERINEURAL CODE/TRAUMA/SEDATION MEDICATION
Status: COMPLETED | OUTPATIENT
Start: 2019-08-21 | End: 2019-08-21

## 2019-08-21 RX ORDER — FENTANYL CITRATE 50 UG/ML
INJECTION, SOLUTION INTRAMUSCULAR; INTRAVENOUS CODE/TRAUMA/SEDATION MEDICATION
Status: COMPLETED | OUTPATIENT
Start: 2019-08-21 | End: 2019-08-21

## 2019-08-21 RX ADMIN — METRONIDAZOLE 500 MG: 500 TABLET ORAL at 21:07

## 2019-08-21 RX ADMIN — ASPIRIN 81 MG: 81 TABLET, COATED ORAL at 08:31

## 2019-08-21 RX ADMIN — METRONIDAZOLE 500 MG: 500 TABLET ORAL at 14:58

## 2019-08-21 RX ADMIN — MIDAZOLAM HYDROCHLORIDE 0.5 MG: 1 INJECTION, SOLUTION INTRAMUSCULAR; INTRAVENOUS at 11:46

## 2019-08-21 RX ADMIN — HEPARIN SODIUM 5000 UNITS: 5000 INJECTION INTRAVENOUS; SUBCUTANEOUS at 21:07

## 2019-08-21 RX ADMIN — INSULIN LISPRO 2 UNITS: 100 INJECTION, SOLUTION INTRAVENOUS; SUBCUTANEOUS at 18:24

## 2019-08-21 RX ADMIN — MIDAZOLAM HYDROCHLORIDE 0.5 MG: 1 INJECTION, SOLUTION INTRAMUSCULAR; INTRAVENOUS at 11:49

## 2019-08-21 RX ADMIN — HEPARIN SODIUM 5000 UNITS: 5000 INJECTION INTRAVENOUS; SUBCUTANEOUS at 14:58

## 2019-08-21 RX ADMIN — CEFAZOLIN SODIUM 2000 MG: 2 SOLUTION INTRAVENOUS at 05:32

## 2019-08-21 RX ADMIN — HEPARIN SODIUM 5000 UNITS: 5000 INJECTION INTRAVENOUS; SUBCUTANEOUS at 05:08

## 2019-08-21 RX ADMIN — FENTANYL CITRATE 25 MCG: 50 INJECTION, SOLUTION INTRAMUSCULAR; INTRAVENOUS at 11:49

## 2019-08-21 RX ADMIN — INSULIN LISPRO 1 UNITS: 100 INJECTION, SOLUTION INTRAVENOUS; SUBCUTANEOUS at 14:59

## 2019-08-21 RX ADMIN — FUROSEMIDE 20 MG: 20 TABLET ORAL at 08:31

## 2019-08-21 RX ADMIN — IODIXANOL 110 ML: 320 INJECTION, SOLUTION INTRAVASCULAR at 13:44

## 2019-08-21 RX ADMIN — HEPARIN SODIUM 2000 UNITS: 1000 INJECTION INTRAVENOUS; SUBCUTANEOUS at 12:36

## 2019-08-21 RX ADMIN — VANCOMYCIN HYDROCHLORIDE 1500 MG: 1 INJECTION, POWDER, LYOPHILIZED, FOR SOLUTION INTRAVENOUS at 09:30

## 2019-08-21 RX ADMIN — FENTANYL CITRATE 50 MCG: 50 INJECTION, SOLUTION INTRAMUSCULAR; INTRAVENOUS at 12:37

## 2019-08-21 RX ADMIN — LIDOCAINE HYDROCHLORIDE 2 ML: 10 INJECTION, SOLUTION INFILTRATION; PERINEURAL at 12:02

## 2019-08-21 RX ADMIN — INSULIN LISPRO 3 UNITS: 100 INJECTION, SOLUTION INTRAVENOUS; SUBCUTANEOUS at 21:07

## 2019-08-21 RX ADMIN — FENTANYL CITRATE 25 MCG: 50 INJECTION, SOLUTION INTRAMUSCULAR; INTRAVENOUS at 11:46

## 2019-08-21 NOTE — ASSESSMENT & PLAN NOTE
Hyponatremia likely in setting of diuretic use, corrected with IV fluids and TSH is normal    Results from last 7 days   Lab Units 08/21/19  0443 08/20/19  0644 08/19/19  1259   SODIUM mmol/L 134* 135* 129*

## 2019-08-21 NOTE — PROGRESS NOTES
Progress Note - Infectious Disease   Ni Montaño 80 y o  male MRN: [id number]  Unit/Bed#: -01 Encounter: [id number]      Impression/Plan:  1  Diabetic foot ulcer with cellulitis and gangrene   Patient presents at this time with progression of the previously diagnosed diabetic foot ulcer with cellulitis and gangrene   Imaging concerning for osteomyelitis   Patient is fortunately hemodynamically stable   No other prior culture data in our system  Eosinophilia noted on labs today  Stop Ancef  Changed to vancomycin  Continue Flagyl  Continue to trend fever curve/vitals  Repeat CBC/chemistry tomorrow  Serial foot exams  Podiatry evaluation appreciated   Vascular surgery follow-up ongoing  Follow up pending cultures  Additional care as per primary  Additional interventions pending clinical course     2  Fifth metatarsal osteomyelitis   Patient noted with progressive diabetic foot ulcer and now osteomyelitis on imaging   Patient has failed outpatient antibiotic therapy possibly with amoxicillin  Antibiotics as above  IR/vascular interventions ongoing  Continue to trend fever curve/vitals  Serial exams  Podiatry evaluation appreciated  Additional care as per primary  Planned for eventual amputation      3  Leukocytosis and peripheral eosinophilia   Patient with elevated white blood cell count on admission which is likely due to the above   No other obvious sources on exam   White count down trending  Eosinophilia noted possibly due to Ancef  Repeat CBC tomorrow  Antibiotics changed as above  Additional care as above     4  Chronic kidney disease   Patient with underlying chronic kidney disease   Creatinine appears to be close to baseline  Pharmacy consult for vancomycin monitoring  No dose adjustment for Flagyl  Repeat chemistry tomorrow  Additional care as per primary     5  Poorly controlled diabetes   Patient noted with hemoglobin A1c of 7  Unfortunately this is risk factor for progressing wounds  Glucose management as per primary   Antibiotics as above      6  Sick sinus syndrome with pacemaker   No acute issues noted at pacemaker site   Follow up pending blood cultures      7  Reported dementia   Patient seems to have issues related to time  Francisco Javier Porter may have underlying dementia   Currently is fully independent and lives on his own  Placement/service issues as per primary service  Ongoing follow-up by case management  Consider rehab given poor compliance as outpatient     Above plan discussed in detail with the patient and with primary service      ID consult service will continue to follow  Antibiotics:  Ancef discontinued  Vancomycin 1  Flagyl 3    24 hour events:  Patient is currently afebrile  White blood cell count has declined  Blood cultures pending  Wound cultures with Staph  Angiogram with interventional Radiology today  Patient's other vitals are stable  No other acute events noted overnight on chart review  CBC with significant eosinophilia    Subjective:  Patient currently denies having any nausea, vomiting, chest pain or shortness of breath  He denies having any subjective fevers  He denies having any significant pain in his foot at the moment  Patient is currently tolerating antibiotic without development of rash  Objective:  Vitals:  Temp:  [98 °F (36 7 °C)-99 °F (37 2 °C)] 98 3 °F (36 8 °C)  HR:  [59-73] 59  Resp:  [16-20] 18  BP: (114-158)/(53-88) 158/75  SpO2:  [98 %-100 %] 100 %  Temp (24hrs), Av 4 °F (36 9 °C), Min:98 °F (36 7 °C), Max:99 °F (37 2 °C)  Current: Temperature: 98 3 °F (36 8 °C)    Physical Exam:   General Appearance:  Alert, interactive, nontoxic, no acute distress  Throat: Oropharynx moist without lesions  Lungs:   Clear to auscultation bilaterally; no wheezes, rhonchi or rales; respirations unlabored on room air   Heart:  RRR; no murmur, rub or gallop   Abdomen:   Soft, non-tender, non-distended, positive bowel sounds       Extremities: No clubbing, cyanosis or edema   Skin: No new rashes or lesions  No New draining wounds noted  Patient's right foot wound remains stable  Labs, Imaging, & Other studies:   All pertinent labs and imaging studies were personally reviewed  Results from last 7 days   Lab Units 08/21/19  0443 08/20/19  0644 08/19/19  1259   WBC Thousand/uL 10 93* 12 54* 13 35*   HEMOGLOBIN g/dL 12 2 12 2 14 2   PLATELETS Thousands/uL 377 359 396*     Results from last 7 days   Lab Units 08/21/19  0443 08/20/19  0644 08/19/19  1259   POTASSIUM mmol/L 4 4 3 8 5 3   CHLORIDE mmol/L 99* 103 92*   CO2 mmol/L 27 25 27   BUN mg/dL 12 13 18   CREATININE mg/dL 1 14 1 06 1 36*   EGFR ml/min/1 73sq m 56 61 45   CALCIUM mg/dL 8 4 8 1* 9 2   AST U/L  --  11 12   ALT U/L  --  8* 12   ALK PHOS U/L  --  52 64     Results from last 7 days   Lab Units 08/19/19  1422 08/19/19  1300   BLOOD CULTURE   --  No Growth at 24 hrs     GRAM STAIN RESULT  Rare Polys*  3+ Gram positive cocci in pairs*  Rare Gram negative rods*  Rare Gram positive rods*  --    WOUND CULTURE  4+ Growth of Methicillin Resistant Staphylococcus aureus*  2+ Growth of Proteus vulgaris group*  --

## 2019-08-21 NOTE — PROGRESS NOTES
Patient transported to [place] [address] in bed on monitor  Report give to Cuco Davis, care assumed  Assessed L femoral puncture site with RN, site is clean, dry and intact with no signs or symptoms of bleeding or hematoma noted  Distal pulses remain intact  VSS

## 2019-08-21 NOTE — BRIEF OP NOTE (RAD/CATH)
Aortogram and right lower extremity angiography Procedure Note    PATIENT NAME: Marcel Marvin  : 1928  MRN: [id number]     Pre-op Diagnosis:   1  Cellulitis of right foot    2  Diabetic foot ulcer ([facility] [address] )    3  Peripheral arterial disease (HCC)      Post-op Diagnosis:   1  Cellulitis of right foot    2  Diabetic foot ulcer ([facility] [address] )    3  Peripheral arterial disease ([facility] )        Surgeon:   Maria Del Rosario Ervin DO  Assistants:     No qualified resident was available, Resident is only observing    Estimated Blood Loss: minimal    Findings: occlusion of distal right posterior tibial artery  Successfully recanalized and balloon dilated to 2 mm    Tandem stenoses of proximal and mid posterior tibial artery balloon dilated to 2 5 mm    Specimens: none    Complications:  None apparent    Anesthesia: Conscious sedation and Local    Maria Del Rosario Ervin DO     Date: 2019  Time: 1:36 PM

## 2019-08-21 NOTE — ASSESSMENT & PLAN NOTE
CKD 3 likely secondary to diabetes mellitus  Appears to be at baseline       Results from last 7 days   Lab Units 08/21/19  0443 08/20/19  0644 08/19/19  1259   BUN mg/dL 12 13 18   CREATININE mg/dL 1 14 1 06 1 36*   EGFR ml/min/1 73sq m 56 61 45

## 2019-08-21 NOTE — PROGRESS NOTES
VASCULAR SURGERY NOTE APPRECIATED  AT THIS POINT AWAITING CLEARANCE FROM VASCULAR SURGERY FOR DEBRIDEMENT OF THE 5TH METATARSAL HEAD

## 2019-08-21 NOTE — ASSESSMENT & PLAN NOTE
Wt Readings from Last 3 Encounters:   08/20/19 77 1 kg (170 lb)   05/28/19 80 7 kg (178 lb)   05/08/19 72 6 kg (160 lb)     Chronic diastolic CHF on furosemide    Previously on lisinopril but no longer taking

## 2019-08-21 NOTE — PROGRESS NOTES
Vancomycin Assessment    Hilda Venegas is a 80 y o  male who is currently receiving vancomycin 1250 mg q24h for other Bone/joint infection   Relevant clinical data and objective history reviewed:  Creatinine   Date Value Ref Range Status   08/21/2019 1 14 0 60 - 1 30 mg/dL Final     Comment:     Standardized to IDMS reference method   08/20/2019 1 06 0 60 - 1 30 mg/dL Final     Comment:     Standardized to IDMS reference method   08/19/2019 1 36 (H) 0 60 - 1 30 mg/dL Final     Comment:     Standardized to IDMS reference method   12/22/2015 1 17 0 60 - 1 30 mg/dL Final     Comment:     Standardized to IDMS reference method   12/04/2015 1 26 0 60 - 1 30 mg/dL Final     Comment:     Standardized to IDMS reference method   10/19/2015 1 24 0 60 - 1 30 mg/dL Final     Comment:     Standardized to IDMS reference method     /56   Pulse 71   Temp 98 3 °F (36 8 °C)   Resp 18   Ht 5' 8" (1 727 m)   Wt 77 1 kg (170 lb)   SpO2 98%   BMI 25 85 kg/m²   I/O last 3 completed shifts: In: 560 [P O :560]  Out: 700 [Urine:700]  Lab Results   Component Value Date/Time    BUN 12 08/21/2019 04:43 AM    BUN 26 (H) 12/22/2015 12:33 PM    WBC 10 93 (H) 08/21/2019 04:43 AM    WBC 8 20 10/19/2015 12:16 PM    HGB 12 2 08/21/2019 04:43 AM    HGB 14 8 10/19/2015 12:16 PM    HCT 37 3 08/21/2019 04:43 AM    HCT 44 0 10/19/2015 12:16 PM    MCV 94 08/21/2019 04:43 AM    MCV 92 10/19/2015 12:16 PM     08/21/2019 04:43 AM     10/19/2015 12:16 PM     Temp Readings from Last 3 Encounters:   08/21/19 98 3 °F (36 8 °C)   05/28/19 97 9 °F (36 6 °C)   10/01/18 97 7 °F (36 5 °C)     Vancomycin Days of Therapy: 1    Assessment/Plan  The patient is currently on vancomycin utilizing scheduled dosing based on actual body weight  Baseline risks associated with therapy include: advanced age  The patient is currently receiving 1250 mg q24h and after clinical evaluation will be changed to 1500 mg q24h    Pharmacy will also follow closely for s/sx of nephrotoxicity, infusion reactions and appropriateness of therapy  BMP and CBC will be ordered per protocol  Plan for trough as patient approaches steady state, prior to the 4th  dose at approximately 8/  Due to infection severity, will target a trough of 15-20 (appropriate for most indications)   Pharmacy will continue to follow the patients culture results and clinical progress daily      Natalia Gauthier, Pharmacist

## 2019-08-21 NOTE — ASSESSMENT & PLAN NOTE
Diabetic foot ulcer has been seen podiatrist Adrianne Adorno DPM for 3-4 weeks  Wound is currently foul-smelling  CT images suggesting osteomyelitis without abscess  Wound culture sent in the emergency department  ID is onboard, he will need surgery, however there is evidence of PAD on arterial u/s  Await results of a-gram from last night to determine ability for OR (reportedly bad disease, and planning for vascular intervention today)  D/C Ancef  Follow up cultures, showing staph  ID switching to vanco today, continue flagyl

## 2019-08-21 NOTE — ASSESSMENT & PLAN NOTE
Lab Results   Component Value Date    HGBA1C 7 0 (H) 05/10/2019       Recent Labs     08/20/19  0705 08/20/19  1201 08/20/19  1534 08/20/19 2042   POCGLU 78 70 67 127       Type 2 diabetes mellitus on 70/30 30 units b i d  - currently on hold due to hypoglycemia, resume when more reliable PO intake  Continue to monitor q i d   Blood glucose and adjust accordingly

## 2019-08-21 NOTE — PROGRESS NOTES
CTA reviewed  Right lower extremity has popliteal artery focal stenosis and then and distal posterior tibial artery high grade stenosis versus focal occlusion  Entire AT is occluded  Patient will need arteriogram with intervention, balloon angioplasty of popliteal artery and distal PT to optimize wound healing  Will need iv hydration prior to procedure eGFR of 56 and recent contrast exposure for CTA  Ok to perform toe amp for source control if patient is septic but he will likely not heal due to low perfusion pressure of the toe

## 2019-08-21 NOTE — PROGRESS NOTES
Progress Note - Unique Demarco 11/11/1928, 80 y o  male MRN: [id number]    Unit/Bed#: -01 Encounter: [id number]    Primary Care Provider: Yony Yuen DPM   Date and time admitted to hospital: 8/19/2019 11:45 AM      * Diabetic foot ulcer ([address] )  Assessment & Plan  Diabetic foot ulcer has been seen podiatrist Yony Yuen DPM for 3-4 weeks  Wound is currently foul-smelling  CT images suggesting osteomyelitis without abscess  Wound culture sent in the emergency department  ID is onboard, he will need surgery, however there is evidence of PAD on arterial u/s  Await results of a-gram from last night to determine ability for OR (reportedly bad disease, and planning for vascular intervention today)  D/C Ancef  Follow up cultures, showing staph  ID switching to vanco today, continue flagyl  CAD (coronary artery disease)  Assessment & Plan  Coronary artery disease with history of CABG  No active chest pain  Continue aspirin    CKD stage 3 secondary to diabetes [facility])  Assessment & Plan  CKD 3 likely secondary to diabetes mellitus  Appears to be at baseline  Results from last 7 days   Lab Units 08/21/19  0443 08/20/19  0644 08/19/19  1259   BUN mg/dL 12 13 18   CREATININE mg/dL 1 14 1 06 1 36*   EGFR ml/min/1 73sq m 56 61 45       Type 2 diabetes mellitus with diabetic chronic kidney disease [facility])  Assessment & Plan  Lab Results   Component Value Date    HGBA1C 7 0 (H) 05/10/2019       Recent Labs     08/20/19  0705 08/20/19  1201 08/20/19  1534 08/20/19  2042   POCGLU 78 70 67 127       Type 2 diabetes mellitus on 70/30 30 units b i d  - currently on hold due to hypoglycemia, resume when more reliable PO intake  Continue to monitor q i d   Blood glucose and adjust accordingly      Congestive heart failure (CHF) [facility])  Assessment & Plan  Wt Readings from Last 3 Encounters:   08/20/19 77 1 kg (170 lb)   05/28/19 80 7 kg (178 lb)   05/08/19 72 6 kg (160 lb)     Chronic diastolic CHF on furosemide  Previously on lisinopril but no longer taking    Hyponatremia  Assessment & Plan  Hyponatremia likely in setting of diuretic use, corrected with IV fluids and TSH is normal    Results from last 7 days   Lab Units 08/21/19  0443 08/20/19  0644 08/19/19  1259   SODIUM mmol/L 134* 135* 129*       Dementia  Assessment & Plan  Dementia reportedly previously on donepezil  Monitor for any signs of sundowning    SSS (sick sinus syndrome) ([facility])  Assessment & Plan  Sick sinus syndrome status post pacer    Hyperlipidemia  Assessment & Plan  Hyperlipidemia previously on pravastatin  Statin likely of little benefit for this advanced age gentleman    BPH (benign prostatic hyperplasia)  Assessment & Plan  Reported history of BPH without active symptoms    Hypertension, essential  Assessment & Plan  BP is currently controlled      VTE Pharmacologic Prophylaxis:   Pharmacologic: Heparin  Mechanical VTE Prophylaxis in Place: No    Patient Centered Rounds: I have performed bedside rounds with nursing staff today  Discussions with Specialists or Other Care Team Provider: vascular, ID     Education and Discussions with Family / Patient: patient, also LMOM for his DIL    Time Spent for Care: 20 minutes  More than 50% of total time spent on counseling and coordination of care as described above  Current Length of Stay: 2 day(s)    Current Patient Status: Inpatient   Certification Statement: The patient will continue to require additional inpatient hospital stay due to await definitive treatment of osteomyelitis    Discharge Plan: pending surgery and post-op course, PT/OT pending     Code Status: Level 1 - Full Code    Subjective:   Patient seen and examined, he is feeling well  He would like to eat, but understands he needs to continue to undergo additional workup to be able to have the amputation  The or chills  No chest pain or shortness of breath    Does request that we contact his daughter-in-law to disseminate any information to his family  Objective:     Vitals:   Temp (24hrs), Av 3 °F (36 8 °C), Min:98 °F (36 7 °C), Max:99 °F (37 2 °C)    Temp:  [98 °F (36 7 °C)-99 °F (37 2 °C)] 98 °F (36 7 °C)  HR:  [68-73] 73  Resp:  [18-20] 18  BP: (115-141)/(53-86) 115/53  SpO2:  [97 %-99 %] 99 %  Body mass index is 25 85 kg/m²  Input and Output Summary (last 24 hours): Intake/Output Summary (Last 24 hours) at 2019 0631  Last data filed at 2019 0300  Gross per 24 hour   Intake 560 ml   Output 700 ml   Net -140 ml       Physical Exam:     Physical Exam   Constitutional: Vital signs are normal  He appears well-developed  No distress  Cardiovascular: Normal rate, regular rhythm, S1 normal, S2 normal and normal heart sounds  Pulmonary/Chest: Effort normal and breath sounds normal  No respiratory distress  He has no wheezes  He has no rhonchi  He has no rales  Abdominal: Soft  Bowel sounds are normal  He exhibits no distension  There is no tenderness  Musculoskeletal: He exhibits no edema  Skin:   Dressing to the right foot without drainage    Psychiatric: He has a normal mood and affect  Nursing note and vitals reviewed         Additional Data:     Labs:    Results from last 7 days   Lab Units 19  0443   WBC Thousand/uL 10 93*   HEMOGLOBIN g/dL 12 2   HEMATOCRIT % 37 3   PLATELETS Thousands/uL 377   NEUTROS PCT % 65   LYMPHS PCT % 14   MONOS PCT % 10   EOS PCT % 10*     Results from last 7 days   Lab Units 19  0443 19  0644   SODIUM mmol/L 134* 135*   POTASSIUM mmol/L 4 4 3 8   CHLORIDE mmol/L 99* 103   CO2 mmol/L 27 25   BUN mg/dL 12 13   CREATININE mg/dL 1 14 1 06   ANION GAP mmol/L 8 7   CALCIUM mg/dL 8 4 8 1*   ALBUMIN g/dL  --  2 4*   TOTAL BILIRUBIN mg/dL  --  0 40   ALK PHOS U/L  --  52   ALT U/L  --  8*   AST U/L  --  11   GLUCOSE RANDOM mg/dL 155* 73     Results from last 7 days   Lab Units 19  1259   INR  1 23*     Results from last 7 days   Lab Units 19  2569 08/20/19  1534 08/20/19  1201 08/20/19  0705 08/19/19  2116 08/19/19  1609   POC GLUCOSE mg/dl 127 67 70 78 91 277*         Results from last 7 days   Lab Units 08/20/19  0644 08/19/19  1719 08/19/19  1259   LACTIC ACID mmol/L 1 0 3 4* 2 9*   PROCALCITONIN ng/ml  --   --  <0 05           * I Have Reviewed All Lab Data Listed Above  * Additional Pertinent Lab Tests Reviewed: All Labs Within Last 24 Hours Reviewed    Imaging:    Imaging Reports Reviewed Today Include: A-gram pending report  Imaging Personally Reviewed by Myself Includes:      Recent Cultures (last 7 days):     Results from last 7 days   Lab Units 08/19/19  1422 08/19/19  1300   BLOOD CULTURE   --  No Growth at 24 hrs     GRAM STAIN RESULT  Rare Polys*  3+ Gram positive cocci in pairs*  Rare Gram negative rods*  Rare Gram positive rods*  --    WOUND CULTURE  3+ Growth of Staphylococcus aureus*  --        Last 24 Hours Medication List:     Current Facility-Administered Medications:  acetaminophen 650 mg Oral Q6H PRN Bel Lo, DO    aspirin 81 mg Oral Daily Ashutosh Camargo, DO    cefazolin 2,000 mg Intravenous Q8H Kash Webber MD Last Rate: 2,000 mg (08/21/19 0532)   docusate sodium 100 mg Oral BID PRN Bel Lo, DO    furosemide 20 mg Oral Daily Ashutosh Camargo DO    heparin (porcine) 5,000 Units Subcutaneous [facility] Ashutosh Camargo, DO    hydrALAZINE 10 mg Intravenous Q4H PRN Bel Lo, DO    HYDROcodone-acetaminophen 1 tablet Oral Q4H PRN Bel Lo, DO    HYDROmorphone 0 2 mg Intravenous Q4H PRN Ashutosh Camargo, DO    insulin lispro 1-6 Units Subcutaneous 4x Daily (AC & HS) Ashutosh Camargo DO    magnesium hydroxide 30 mL Oral BID PRN Radha Bunch MD    metroNIDAZOLE 500 mg Oral [facility] Kash Webber MD    ondansetron 4 mg Intravenous Q4H PRN Ashutosh Camargo, DO    sodium chloride (PF) 3 mL Intravenous PRN Bel Lo, DO         Today, Patient Was Seen By: Dolores Guardado PA-C    ** Please Note: Dictation voice to text software may have been used in the creation of this document   **

## 2019-08-21 NOTE — PLAN OF CARE
Problem: Nutrition/Hydration-ADULT  Goal: Nutrient/Hydration intake appropriate for improving, restoring or maintaining nutritional needs  Description  Monitor and assess patient's nutrition/hydration status for malnutrition  Collaborate with interdisciplinary team and initiate plan and interventions as ordered  Monitor patient's weight and dietary intake as ordered or per policy  Utilize nutrition screening tool and intervene as necessary  Determine patient's food preferences and provide high-protein, high-caloric foods as appropriate       INTERVENTIONS:  - Monitor oral intake, urinary output, labs, and treatment plans  - Assess nutrition and hydration status and recommend course of action  - Evaluate amount of meals eaten  - Assist patient with eating if necessary   - Allow adequate time for meals  - Recommend/ encourage appropriate diets, oral nutritional supplements, and vitamin/mineral supplements  - Order, calculate, and assess calorie counts as needed  - Recommend, monitor, and adjust tube feedings and TPN/PPN based on assessed needs  - Assess need for intravenous fluids  - Provide specific nutrition/hydration education as appropriate  - Include patient/family/caregiver in decisions related to nutrition  Outcome: Progressing     Problem: PAIN - ADULT  Goal: Verbalizes/displays adequate comfort level or baseline comfort level  Description  Interventions:  - Encourage patient to monitor pain and request assistance  - Assess pain using appropriate pain scale  - Administer analgesics based on type and severity of pain and evaluate response  - Implement non-pharmacological measures as appropriate and evaluate response  - Consider cultural and social influences on pain and pain management  - Notify physician/advanced practitioner if interventions unsuccessful or patient reports new pain  Outcome: Progressing     Problem: INFECTION - ADULT  Goal: Absence or prevention of progression during hospitalization  Description  INTERVENTIONS:  - Assess and monitor for signs and symptoms of infection  - Monitor lab/diagnostic results  - Monitor all insertion sites, i e  indwelling lines, tubes, and drains  - Monitor endotracheal if appropriate and nasal secretions for changes in amount and color  - [place] appropriate cooling/warming therapies per order  - Administer medications as ordered  - Instruct and encourage patient and family to use good hand hygiene technique  - Identify and instruct in appropriate isolation precautions for identified infection/condition  Outcome: Progressing     Problem: SAFETY ADULT  Goal: Patient will remain free of falls  Description  INTERVENTIONS:  - Assess patient frequently for physical needs  -  Identify cognitive and physical deficits and behaviors that affect risk of falls    -  [place] fall precautions as indicated by assessment   - Educate patient/family on patient safety including physical limitations  - Instruct patient to call for assistance with activity based on assessment  - Modify environment to reduce risk of injury  - Consider OT/PT consult to assist with strengthening/mobility  Outcome: Progressing  Goal: Maintain or return to baseline ADL function  Description  INTERVENTIONS:  -  Assess patient's ability to carry out ADLs; assess patient's baseline for ADL function and identify physical deficits which impact ability to perform ADLs (bathing, care of mouth/teeth, toileting, grooming, dressing, etc )  - Assess/evaluate cause of self-care deficits   - Assess range of motion  - Assess patient's mobility; develop plan if impaired  - Assess patient's need for assistive devices and provide as appropriate  - Encourage maximum independence but intervene and supervise when necessary  - Involve family in performance of ADLs  - Assess for home care needs following discharge   - Consider OT consult to assist with ADL evaluation and planning for discharge  - Provide patient education as appropriate  Outcome: Progressing  Goal: Maintain or return mobility status to optimal level  Description  INTERVENTIONS:  - Assess patient's baseline mobility status (ambulation, transfers, stairs, etc )    - Identify cognitive and physical deficits and behaviors that affect mobility  - Identify mobility aids required to assist with transfers and/or ambulation (gait belt, sit-to-stand, lift, walker, cane, etc )  - [place] fall precautions as indicated by assessment  - Record patient progress and toleration of activity level on Mobility SBAR; progress patient to next Phase/Stage  - Instruct patient to call for assistance with activity based on assessment  - Consider rehabilitation consult to assist with strengthening/weightbearing, etc   Outcome: Progressing     Problem: DISCHARGE PLANNING  Goal: Discharge to home or other facility with appropriate resources  Description  INTERVENTIONS:  - Identify barriers to discharge w/patient and caregiver  - Arrange for needed discharge resources and transportation as appropriate  - Identify discharge learning needs (meds, wound care, etc )  - Arrange for interpretive services to assist at discharge as needed  - Refer to Case Management Department for coordinating discharge planning if the patient needs post-hospital services based on physician/advanced practitioner order or complex needs related to functional status, cognitive ability, or social support system  Outcome: Progressing     Problem: Knowledge Deficit  Goal: Patient/family/caregiver demonstrates understanding of disease process, treatment plan, medications, and discharge instructions  Description  Complete learning assessment and assess knowledge base    Interventions:  - Provide teaching at level of understanding  - Provide teaching via preferred learning methods  Outcome: Progressing  Note:   Pt  Educated on required test prior to scheduled procedure this AM      Problem: Potential for Falls  Goal: Patient will remain free of falls  Description  INTERVENTIONS:  - Assess patient frequently for physical needs  -  Identify cognitive and physical deficits and behaviors that affect risk of falls    -  [place] fall precautions as indicated by assessment   - Educate patient/family on patient safety including physical limitations  - Instruct patient to call for assistance with activity based on assessment  - Modify environment to reduce risk of injury  - Consider OT/PT consult to assist with strengthening/mobility  Outcome: Progressing

## 2019-08-22 ENCOUNTER — ANESTHESIA EVENT (INPATIENT)
Dept: PERIOP | Facility: HOSPITAL | Age: 84
DRG: 617 | End: 2019-08-22
Payer: MEDICARE

## 2019-08-22 DIAGNOSIS — N40.0 BENIGN PROSTATIC HYPERPLASIA, UNSPECIFIED WHETHER LOWER URINARY TRACT SYMPTOMS PRESENT: ICD-10-CM

## 2019-08-22 LAB
ANION GAP SERPL CALCULATED.3IONS-SCNC: 8 MMOL/L (ref 4–13)
BASOPHILS # BLD AUTO: 0.05 THOUSANDS/ΜL (ref 0–0.1)
BASOPHILS NFR BLD AUTO: 1 % (ref 0–1)
BUN SERPL-MCNC: 13 MG/DL (ref 5–25)
CALCIUM SERPL-MCNC: 8.3 MG/DL (ref 8.3–10.1)
CHLORIDE SERPL-SCNC: 101 MMOL/L (ref 100–108)
CO2 SERPL-SCNC: 26 MMOL/L (ref 21–32)
CREAT SERPL-MCNC: 1.09 MG/DL (ref 0.6–1.3)
EOSINOPHIL # BLD AUTO: 1.07 THOUSAND/ΜL (ref 0–0.61)
EOSINOPHIL NFR BLD AUTO: 11 % (ref 0–6)
ERYTHROCYTE [DISTWIDTH] IN BLOOD BY AUTOMATED COUNT: 13.2 % (ref 11.6–15.1)
GFR SERPL CREATININE-BSD FRML MDRD: 59 ML/MIN/1.73SQ M
GLUCOSE SERPL-MCNC: 182 MG/DL (ref 65–140)
GLUCOSE SERPL-MCNC: 189 MG/DL (ref 65–140)
GLUCOSE SERPL-MCNC: 221 MG/DL (ref 65–140)
GLUCOSE SERPL-MCNC: 237 MG/DL (ref 65–140)
GLUCOSE SERPL-MCNC: 248 MG/DL (ref 65–140)
HCT VFR BLD AUTO: 37 % (ref 36.5–49.3)
HGB BLD-MCNC: 12.1 G/DL (ref 12–17)
IMM GRANULOCYTES # BLD AUTO: 0.09 THOUSAND/UL (ref 0–0.2)
IMM GRANULOCYTES NFR BLD AUTO: 1 % (ref 0–2)
LYMPHOCYTES # BLD AUTO: 1.39 THOUSANDS/ΜL (ref 0.6–4.47)
LYMPHOCYTES NFR BLD AUTO: 14 % (ref 14–44)
MCH RBC QN AUTO: 30.8 PG (ref 26.8–34.3)
MCHC RBC AUTO-ENTMCNC: 32.7 G/DL (ref 31.4–37.4)
MCV RBC AUTO: 94 FL (ref 82–98)
MONOCYTES # BLD AUTO: 0.91 THOUSAND/ΜL (ref 0.17–1.22)
MONOCYTES NFR BLD AUTO: 9 % (ref 4–12)
NEUTROPHILS # BLD AUTO: 6.59 THOUSANDS/ΜL (ref 1.85–7.62)
NEUTS SEG NFR BLD AUTO: 64 % (ref 43–75)
NRBC BLD AUTO-RTO: 0 /100 WBCS
PLATELET # BLD AUTO: 394 THOUSANDS/UL (ref 149–390)
PMV BLD AUTO: 9.5 FL (ref 8.9–12.7)
POTASSIUM SERPL-SCNC: 4.2 MMOL/L (ref 3.5–5.3)
RBC # BLD AUTO: 3.93 MILLION/UL (ref 3.88–5.62)
SODIUM SERPL-SCNC: 135 MMOL/L (ref 136–145)
WBC # BLD AUTO: 10.1 THOUSAND/UL (ref 4.31–10.16)

## 2019-08-22 PROCEDURE — 80048 BASIC METABOLIC PNL TOTAL CA: CPT | Performed by: INTERNAL MEDICINE

## 2019-08-22 PROCEDURE — 82948 REAGENT STRIP/BLOOD GLUCOSE: CPT

## 2019-08-22 PROCEDURE — 99233 SBSQ HOSP IP/OBS HIGH 50: CPT | Performed by: INTERNAL MEDICINE

## 2019-08-22 PROCEDURE — 85025 COMPLETE CBC W/AUTO DIFF WBC: CPT | Performed by: INTERNAL MEDICINE

## 2019-08-22 PROCEDURE — NC001 PR NO CHARGE: Performed by: SURGERY

## 2019-08-22 PROCEDURE — 99232 SBSQ HOSP IP/OBS MODERATE 35: CPT | Performed by: PHYSICIAN ASSISTANT

## 2019-08-22 RX ORDER — CEFTRIAXONE 2 G/50ML
2000 INJECTION, SOLUTION INTRAVENOUS EVERY 24 HOURS
Status: DISCONTINUED | OUTPATIENT
Start: 2019-08-22 | End: 2019-08-23

## 2019-08-22 RX ADMIN — FUROSEMIDE 20 MG: 20 TABLET ORAL at 08:34

## 2019-08-22 RX ADMIN — INSULIN LISPRO 2 UNITS: 100 INJECTION, SOLUTION INTRAVENOUS; SUBCUTANEOUS at 13:13

## 2019-08-22 RX ADMIN — METRONIDAZOLE 500 MG: 500 TABLET ORAL at 14:28

## 2019-08-22 RX ADMIN — INSULIN LISPRO 2 UNITS: 100 INJECTION, SOLUTION INTRAVENOUS; SUBCUTANEOUS at 16:23

## 2019-08-22 RX ADMIN — HEPARIN SODIUM 5000 UNITS: 5000 INJECTION INTRAVENOUS; SUBCUTANEOUS at 21:59

## 2019-08-22 RX ADMIN — HEPARIN SODIUM 5000 UNITS: 5000 INJECTION INTRAVENOUS; SUBCUTANEOUS at 05:48

## 2019-08-22 RX ADMIN — INSULIN LISPRO 1 UNITS: 100 INJECTION, SOLUTION INTRAVENOUS; SUBCUTANEOUS at 08:34

## 2019-08-22 RX ADMIN — METRONIDAZOLE 500 MG: 500 TABLET ORAL at 05:48

## 2019-08-22 RX ADMIN — VANCOMYCIN HYDROCHLORIDE 1500 MG: 1 INJECTION, POWDER, LYOPHILIZED, FOR SOLUTION INTRAVENOUS at 08:34

## 2019-08-22 RX ADMIN — METRONIDAZOLE 500 MG: 500 TABLET ORAL at 21:59

## 2019-08-22 RX ADMIN — INSULIN LISPRO 3 UNITS: 100 INJECTION, SOLUTION INTRAVENOUS; SUBCUTANEOUS at 21:59

## 2019-08-22 RX ADMIN — CEFTRIAXONE 2000 MG: 2 INJECTION, SOLUTION INTRAVENOUS at 14:28

## 2019-08-22 RX ADMIN — HEPARIN SODIUM 5000 UNITS: 5000 INJECTION INTRAVENOUS; SUBCUTANEOUS at 14:27

## 2019-08-22 RX ADMIN — ASPIRIN 81 MG: 81 TABLET, COATED ORAL at 08:34

## 2019-08-22 NOTE — PROGRESS NOTES
Discussed with patient the plan for surgery tomorrow  The patient understood and is agreeable to the procedure  Patient is a 5th met head resection right foot tomorrow at 7:30 a m

## 2019-08-22 NOTE — ASSESSMENT & PLAN NOTE
Lab Results   Component Value Date    HGBA1C 7 0 (H) 05/10/2019       Recent Labs     08/21/19  1357 08/21/19  1556 08/21/19 2033 08/22/19  0547   POCGLU 188* 214* 247* 189*       · Type 2 diabetes mellitus on 70/30 30 units b i d  - currently on hold due to hypoglycemia, resume when more reliable PO intake  · Continue to monitor q i d   Blood glucose and adjust accordingly

## 2019-08-22 NOTE — ASSESSMENT & PLAN NOTE
· Hyperlipidemia previously on pravastatin    Statin likely of little benefit for this advanced age gentleman

## 2019-08-22 NOTE — PROGRESS NOTES
Progress Note - Stephani Fowler 11/11/1928, 80 y o  male MRN: [id number]    Unit/Bed#: -01 Encounter: [id number]    Primary Care Provider: Yani Cosme DPM   Date and time admitted to hospital: 8/19/2019 11:45 AM    * Diabetic foot ulcer ([address] )  Assessment & Plan  · Diabetic foot ulcer has been seen podiatrist Yani Cosme DPM for 3-4 weeks  CT images suggesting osteomyelitis without abscess  Wound culture sent in the emergency department, (+) MRSA  · ID is onboard, Vanco and flagyl  · Vascular is onboard, s/p angio 8/21, now cleared for surgery  · Podiatry is onboard, planning for OR  · Remains hemodynamically stable, afebrile, no leukocytosis    CAD (coronary artery disease)  Assessment & Plan  · Coronary artery disease with history of CABG  No active chest pain  Continue aspirin    CKD stage 3 secondary to diabetes [facility])  Assessment & Plan  · CKD 3 likely secondary to diabetes mellitus  Appears to be at baseline  Results from last 7 days   Lab Units 08/22/19  0541 08/21/19  0443 08/20/19  0644 08/19/19  1259   BUN mg/dL 13 12 13 18   CREATININE mg/dL 1 09 1 14 1 06 1 36*   EGFR ml/min/1 73sq m 59 56 61 45       Type 2 diabetes mellitus with diabetic chronic kidney disease [facility])  Assessment & Plan  Lab Results   Component Value Date    HGBA1C 7 0 (H) 05/10/2019       Recent Labs     08/21/19  1357 08/21/19  1556 08/21/19  2033 08/22/19  0547   POCGLU 188* 214* 247* 189*       · Type 2 diabetes mellitus on 70/30 30 units b i d  - currently on hold due to hypoglycemia, resume when more reliable PO intake  · Continue to monitor q i d  Blood glucose and adjust accordingly      Congestive heart failure (CHF) ([facility])  Assessment & Plan  Wt Readings from Last 3 Encounters:   08/20/19 77 1 kg (170 lb)   05/28/19 80 7 kg (178 lb)   05/08/19 72 6 kg (160 lb)     · Chronic diastolic CHF on furosemide    Previously on lisinopril but no longer taking    Hyponatremia  Assessment & Plan  Hyponatremia likely in setting of diuretic use, corrected with IV fluids and TSH is normal    Results from last 7 days   Lab Units 08/22/19  0541 08/21/19  0443 08/20/19  0644 08/19/19  1259   SODIUM mmol/L 135* 134* 135* 129*       Dementia  Assessment & Plan  · Dementia reportedly previously on donepezil  Monitor for any signs of sundowning    SSS (sick sinus syndrome) ([facility])  Assessment & Plan  · Sick sinus syndrome status post pacer    Hyperlipidemia  Assessment & Plan  · Hyperlipidemia previously on pravastatin  Statin likely of little benefit for this advanced age gentleman    BPH (benign prostatic hyperplasia)  Assessment & Plan  · Reported history of BPH without active symptoms    Hypertension, essential  Assessment & Plan  · BP is currently controlled      VTE Pharmacologic Prophylaxis:   Pharmacologic: Heparin  Mechanical VTE Prophylaxis in Place: No    Patient Centered Rounds: I have performed bedside rounds with nursing staff today  Discussions with Specialists or Other Care Team Provider: vascular, ID; left message for podiatry    Education and Discussions with Family / Patient: patient, LMOM for DIL by phone     Time Spent for Care: 20 minutes  More than 50% of total time spent on counseling and coordination of care as described above  Current Length of Stay: 3 day(s)    Current Patient Status: Inpatient   Certification Statement: The patient will continue to require additional inpatient hospital stay due to await surgery for osteomyelitis    Discharge Plan: not medically cleared, PT/OT after OR     Code Status: Level 1 - Full Code      Subjective:   Patient seen, feeling well  Denies chest pain, palpitations, shortness of breath  Reports he has not had any fevers and has felt well the entire time he is here  Denies any pain in the right foot  We did discuss that his wound culture is growing MRSA and that his antibiotics were changed yesterday in anticipation of that      Objective:     Vitals:   Temp (24hrs), Av 1 °F (36 7 °C), Min:97 9 °F (36 6 °C), Max:98 2 °F (36 8 °C)    Temp:  [97 9 °F (36 6 °C)-98 2 °F (36 8 °C)] 98 2 °F (36 8 °C)  HR:  [59-80] 74  Resp:  [16-19] 19  BP: (115-158)/(57-96) 135/60  SpO2:  [96 %-100 %] 97 %  Body mass index is 25 85 kg/m²  Input and Output Summary (last 24 hours): Intake/Output Summary (Last 24 hours) at 2019 3497  Last data filed at 2019 2312  Gross per 24 hour   Intake 648 42 ml   Output 1625 ml   Net -976 58 ml       Physical Exam:     Physical Exam   Constitutional: Vital signs are normal  He appears well-developed and well-nourished  No distress  Cardiovascular: Normal rate, regular rhythm, S1 normal, S2 normal, normal heart sounds and intact distal pulses  No murmur heard  Pulmonary/Chest: Effort normal and breath sounds normal  No respiratory distress  He has no wheezes  He has no rhonchi  He has no rales  Abdominal: Soft  Bowel sounds are normal  He exhibits no distension  There is no tenderness  Musculoskeletal: He exhibits no edema or tenderness  Skin:   Dressing intact right foot   Psychiatric: He has a normal mood and affect  Nursing note and vitals reviewed        Additional Data:     Labs:    Results from last 7 days   Lab Units 19  0541   WBC Thousand/uL 10 10   HEMOGLOBIN g/dL 12 1   HEMATOCRIT % 37 0   PLATELETS Thousands/uL 394*   NEUTROS PCT % 64   LYMPHS PCT % 14   MONOS PCT % 9   EOS PCT % 11*     Results from last 7 days   Lab Units 19  0541  19  0644   SODIUM mmol/L 135*   < > 135*   POTASSIUM mmol/L 4 2   < > 3 8   CHLORIDE mmol/L 101   < > 103   CO2 mmol/L 26   < > 25   BUN mg/dL 13   < > 13   CREATININE mg/dL 1 09   < > 1 06   ANION GAP mmol/L 8   < > 7   CALCIUM mg/dL 8 3   < > 8 1*   ALBUMIN g/dL  --   --  2 4*   TOTAL BILIRUBIN mg/dL  --   --  0 40   ALK PHOS U/L  --   --  52   ALT U/L  --   --  8*   AST U/L  --   --  11   GLUCOSE RANDOM mg/dL 182*   < > 73    < > = values in this interval not displayed  Results from last 7 days   Lab Units 08/19/19  1259   INR  1 23*     Results from last 7 days   Lab Units 08/22/19  0547 08/21/19  2033 08/21/19  1556 08/21/19  1357 08/21/19  0457 08/20/19  2042 08/20/19  1534 08/20/19  1201 08/20/19  0705 08/19/19  2116 08/19/19  1609   POC GLUCOSE mg/dl 189* 247* 214* 188* 161* 127 67 70 78 91 277*         Results from last 7 days   Lab Units 08/20/19  0644 08/19/19  1719 08/19/19  1259   LACTIC ACID mmol/L 1 0 3 4* 2 9*   PROCALCITONIN ng/ml  --   --  <0 05           * I Have Reviewed All Lab Data Listed Above  * Additional Pertinent Lab Tests Reviewed: All Labs Within Last 24 Hours Reviewed      Recent Cultures (last 7 days):     Results from last 7 days   Lab Units 08/19/19  1422 08/19/19  1300   BLOOD CULTURE   --  No Growth at 48 hrs     GRAM STAIN RESULT  Rare Polys*  3+ Gram positive cocci in pairs*  Rare Gram negative rods*  Rare Gram positive rods*  --    WOUND CULTURE  4+ Growth of Methicillin Resistant Staphylococcus aureus*  2+ Growth of Proteus vulgaris group*  --        Last 24 Hours Medication List:     Current Facility-Administered Medications:  acetaminophen 650 mg Oral Q6H PRN Kavitha Bailey, DO    aspirin 81 mg Oral Daily Ashutosh Camargo DO    docusate sodium 100 mg Oral BID PRN Kavitha Bailey, DO    furosemide 20 mg Oral Daily Ashutosh Camargo, DO    heparin (porcine) 5,000 Units Subcutaneous Q8H [facility] Ashutosh Camargo DO    hydrALAZINE 10 mg Intravenous Q4H PRN Kavitha Bailey, DO    HYDROcodone-acetaminophen 1 tablet Oral Q4H PRN Kavitha Bailey, DO    HYDROmorphone 0 2 mg Intravenous Q4H PRN Ashutosh Camargo, DO    insulin lispro 1-6 Units Subcutaneous 4x Daily (AC & HS) Ashutosh Camargo DO    magnesium hydroxide 30 mL Oral BID PRN Gunnar Johnson MD    metroNIDAZOLE 500 mg Oral [facility] Marco Eagle MD    ondansetron 4 mg Intravenous Q4H PRN Kavitha Bailey, DO    sodium chloride (PF) 3 mL Intravenous PRN Ashutosh Camargo, DO    vancomycin 20 mg/kg Intravenous Q24H Verónica Vu DO Last Rate: Stopped (08/21/19 1157)        Today, Patient Was Seen By: Janis Ny PA-C    ** Please Note: Dictation voice to text software may have been used in the creation of this document   **

## 2019-08-22 NOTE — PROGRESS NOTES
Patient underwent successful angiogram and angioplasty of the distal PT with good results  He is clear for further podiatric interventions from vascular standpoint

## 2019-08-22 NOTE — PLAN OF CARE
Problem: Nutrition/Hydration-ADULT  Goal: Nutrient/Hydration intake appropriate for improving, restoring or maintaining nutritional needs  Description  Monitor and assess patient's nutrition/hydration status for malnutrition  Collaborate with interdisciplinary team and initiate plan and interventions as ordered  Monitor patient's weight and dietary intake as ordered or per policy  Utilize nutrition screening tool and intervene as necessary  Determine patient's food preferences and provide high-protein, high-caloric foods as appropriate  INTERVENTIONS:  - Monitor oral intake, urinary output, labs, and treatment plans  - Assess nutrition and hydration status and recommend course of action  - Evaluate amount of meals eaten  - Assist patient with eating if necessary   - Allow adequate time for meals  - Recommend/ encourage appropriate diets, oral nutritional supplements, and vitamin/mineral supplements  - Order, calculate, and assess calorie counts as needed  - Recommend, monitor, and adjust tube feedings and TPN/PPN based on assessed needs  - Assess need for intravenous fluids  - Provide specific nutrition/hydration education as appropriate  - Include patient/family/caregiver in decisions related to nutrition  Outcome: Progressing  Note:   Pt  Eating 100% of meals       Problem: PAIN - ADULT  Goal: Verbalizes/displays adequate comfort level or baseline comfort level  Description  Interventions:  - Encourage patient to monitor pain and request assistance  - Assess pain using appropriate pain scale  - Administer analgesics based on type and severity of pain and evaluate response  - Implement non-pharmacological measures as appropriate and evaluate response  - Consider cultural and social influences on pain and pain management  - Notify physician/advanced practitioner if interventions unsuccessful or patient reports new pain  Outcome: Progressing     Problem: INFECTION - ADULT  Goal: Absence or prevention of progression during hospitalization  Description  INTERVENTIONS:  - Assess and monitor for signs and symptoms of infection  - Monitor lab/diagnostic results  - Monitor all insertion sites, i e  indwelling lines, tubes, and drains  - Monitor endotracheal if appropriate and nasal secretions for changes in amount and color  - [place] appropriate cooling/warming therapies per order  - Administer medications as ordered  - Instruct and encourage patient and family to use good hand hygiene technique  - Identify and instruct in appropriate isolation precautions for identified infection/condition  Outcome: Progressing     Problem: SAFETY ADULT  Goal: Patient will remain free of falls  Description  INTERVENTIONS:  - Assess patient frequently for physical needs  -  Identify cognitive and physical deficits and behaviors that affect risk of falls    -  [place] fall precautions as indicated by assessment   - Educate patient/family on patient safety including physical limitations  - Instruct patient to call for assistance with activity based on assessment  - Modify environment to reduce risk of injury  - Consider OT/PT consult to assist with strengthening/mobility  Outcome: Progressing  Goal: Maintain or return to baseline ADL function  Description  INTERVENTIONS:  -  Assess patient's ability to carry out ADLs; assess patient's baseline for ADL function and identify physical deficits which impact ability to perform ADLs (bathing, care of mouth/teeth, toileting, grooming, dressing, etc )  - Assess/evaluate cause of self-care deficits   - Assess range of motion  - Assess patient's mobility; develop plan if impaired  - Assess patient's need for assistive devices and provide as appropriate  - Encourage maximum independence but intervene and supervise when necessary  - Involve family in performance of ADLs  - Assess for home care needs following discharge   - Consider OT consult to assist with ADL evaluation and planning for discharge  - Provide patient education as appropriate  Outcome: Progressing  Goal: Maintain or return mobility status to optimal level  Description  INTERVENTIONS:  - Assess patient's baseline mobility status (ambulation, transfers, stairs, etc )    - Identify cognitive and physical deficits and behaviors that affect mobility  - Identify mobility aids required to assist with transfers and/or ambulation (gait belt, sit-to-stand, lift, walker, cane, etc )  - [place] fall precautions as indicated by assessment  - Record patient progress and toleration of activity level on Mobility SBAR; progress patient to next Phase/Stage  - Instruct patient to call for assistance with activity based on assessment  - Consider rehabilitation consult to assist with strengthening/weightbearing, etc   Outcome: Progressing     Problem: DISCHARGE PLANNING  Goal: Discharge to home or other facility with appropriate resources  Description  INTERVENTIONS:  - Identify barriers to discharge w/patient and caregiver  - Arrange for needed discharge resources and transportation as appropriate  - Identify discharge learning needs (meds, wound care, etc )  - Arrange for interpretive services to assist at discharge as needed  - Refer to Case Management Department for coordinating discharge planning if the patient needs post-hospital services based on physician/advanced practitioner order or complex needs related to functional status, cognitive ability, or social support system  Outcome: Progressing     Problem: Knowledge Deficit  Goal: Patient/family/caregiver demonstrates understanding of disease process, treatment plan, medications, and discharge instructions  Description  Complete learning assessment and assess knowledge base    Interventions:  - Provide teaching at level of understanding  - Provide teaching via preferred learning methods  Outcome: Progressing     Problem: Potential for Falls  Goal: Patient will remain free of falls  Description  INTERVENTIONS:  - Assess patient frequently for physical needs  -  Identify cognitive and physical deficits and behaviors that affect risk of falls    -  [place] fall precautions as indicated by assessment   - Educate patient/family on patient safety including physical limitations  - Instruct patient to call for assistance with activity based on assessment  - Modify environment to reduce risk of injury  - Consider OT/PT consult to assist with strengthening/mobility  Outcome: Progressing

## 2019-08-22 NOTE — PROGRESS NOTES
Vancomycin IV Pharmacy-to-Dose Consultation    Tg Dennis is a 80 y o   who is currently receiving Vancomycin IV with management by the Pharmacy Consult service  Assessment/Plan:  The patient was reviewed  Renal function is stable and no signs or symptoms of nephrotoxicity and/or infusion reactions were documented in the chart  Based on todays assessment, continue current vancomycin day # 2 of regimen 1500 mg q24h,  with a plan for trough to be drawn at 0830 on 8/24/19  Total Days of Vancomycin therapy received to date: 2 days  We will continue to follow the patients culture results and clinical progress daily      Bushra Ramires, Pharmacist

## 2019-08-22 NOTE — PROGRESS NOTES
Progress Note - Infectious Disease   Lorean Distance 80 y o  male MRN: [id number]  Unit/Bed#: -01 Encounter: [id number]      Impression/Plan:  1  Diabetic foot ulcer with cellulitis and gangrene   Patient presents at this time with progression of the previously diagnosed diabetic foot ulcer with cellulitis and gangrene   Imaging concerning for osteomyelitis   Patient is fortunately hemodynamically stable   No other prior culture data in our system  Eosinophilia noted on labs today  Wound cultures noted to be polymicrobial   Continue vancomycin with Flagyl  Will add ceftriaxone  Continue to trend fever curve/vitals  Repeat CBC/chemistry tomorrow  Serial foot exams  Podiatry evaluation appreciated   Vascular surgery follow-up ongoing  Follow up pending cultures  Will follow up OR cultures and OR findings  Additional care as per primary  If surgical cure is achieved will hopefully transition to oral antibiotic for residual skin infection      2  Fifth metatarsal osteomyelitis   Patient noted with progressive diabetic foot ulcer and now osteomyelitis on imaging   Patient has failed outpatient antibiotic therapy possibly with amoxicillin  Wound cultures polymicrobial increasing patient's risk of relapse  Hopeful for aggressive amputation with clean margins with Podiatry  Antibiotics as above  Continue to trend fever curve/vitals  Serial exams  Podiatry evaluation appreciated  Additional care as per primary  Plan for OR tomorrow      3  Leukocytosis and peripheral eosinophilia   Patient with elevated white blood cell count on admission which is likely due to the above   No other obvious sources on exam   White count down trending  Eosinophilia persisted despite stopping Ancef  Creatinine stable  Repeat CBC tomorrow  Antibiotics as above  Additional care as above     4  Chronic kidney disease   Patient with underlying chronic kidney disease   Creatinine appears to be close to baseline    Pharmacy consult for vancomycin monitoring  No dose adjustment for Flagyl  No dose adjustment for ceftriaxone  Repeat chemistry tomorrow  Additional care as per primary     5  Poorly controlled diabetes   Patient noted with hemoglobin A1c of 7  Unfortunately this is risk factor for progressing wounds  Glucose management as per primary   Antibiotics as above      6  Sick sinus syndrome with pacemaker   No acute issues noted at pacemaker site  Blood cultures fortunately without growth      7  Reported dementia   Patient seems to have issues related to time  [facility] may have underlying dementia   Currently is fully independent and lives on his own  Placement/service issues as per primary service  Ongoing follow-up by case management  Consider rehab given poor compliance as outpatient     Above plan discussed in detail with the patient and with primary service      ID consult service will continue to follow  Antibiotics:  Vancomycin/Flagyl  Ceftriaxone added  Total antibiotic 4    24 hour events:  No acute events noted overnight on chart review  Patient is currently afebrile  He is without leukocytosis  No new imaging overnight  Cultures noted to be polymicrobial  Patient's creatinine is stable  Peripheral eosinophilia persist    Subjective:  Patient currently denies having any nausea, vomiting, chest pain or shortness of breath  He denies any rash secondary to antibiotics  He is tolerating therapy without acute issue  He is aware for plans of surgery tomorrow  Objective:  Vitals:  Temp:  [98 2 °F (36 8 °C)-98 4 °F (36 9 °C)] 98 4 °F (36 9 °C)  HR:  [74-80] 76  Resp:  [19] 19  BP: (115-135)/(59-96) 130/59  SpO2:  [97 %-99 %] 99 %  Temp (24hrs), Av 3 °F (36 8 °C), Min:98 2 °F (36 8 °C), Max:98 4 °F (36 9 °C)  Current: Temperature: 98 4 °F (36 9 °C)    Physical Exam:   General Appearance:  Alert, interactive, nontoxic, no acute distress  Chronically ill-appearing  Throat: Oropharynx moist without lesions      Lungs:   Clear to auscultation bilaterally; no wheezes, rhonchi or rales; respirations unlabored on room air   Heart:  RRR; no murmur, rub or gallop   Abdomen:   Soft, non-tender, non-distended, positive bowel sounds  Extremities: No clubbing, cyanosis or edema   Skin: No new rashes or lesions  No New draining wounds noted  Patient's foot wound noted on palpation I am able to express pus from under the dry eschar  Labs, Imaging, & Other studies:   All pertinent labs and imaging studies were personally reviewed  Results from last 7 days   Lab Units 08/22/19  0541 08/21/19  0443 08/20/19  0644   WBC Thousand/uL 10 10 10 93* 12 54*   HEMOGLOBIN g/dL 12 1 12 2 12 2   PLATELETS Thousands/uL 394* 377 359     Results from last 7 days   Lab Units 08/22/19  0541  08/20/19  0644 08/19/19  1259   POTASSIUM mmol/L 4 2   < > 3 8 5 3   CHLORIDE mmol/L 101   < > 103 92*   CO2 mmol/L 26   < > 25 27   BUN mg/dL 13   < > 13 18   CREATININE mg/dL 1 09   < > 1 06 1 36*   EGFR ml/min/1 73sq m 59   < > 61 45   CALCIUM mg/dL 8 3   < > 8 1* 9 2   AST U/L  --   --  11 12   ALT U/L  --   --  8* 12   ALK PHOS U/L  --   --  52 64    < > = values in this interval not displayed  Results from last 7 days   Lab Units 08/19/19  1422 08/19/19  1300   BLOOD CULTURE   --  No Growth at 48 hrs     GRAM STAIN RESULT  Rare Polys*  3+ Gram positive cocci in pairs*  Rare Gram negative rods*  Rare Gram positive rods*  --    WOUND CULTURE  4+ Growth of Methicillin Resistant Staphylococcus aureus*  2+ Growth of Proteus vulgaris group*  1 colony Klebsiella oxytoca*  --

## 2019-08-22 NOTE — ASSESSMENT & PLAN NOTE
· Diabetic foot ulcer has been seen podiatrist Rula Weaver DPM for 3-4 weeks  CT images suggesting osteomyelitis without abscess    Wound culture sent in the emergency department, (+) MRSA  · ID is onboard, Vanco and flagyl  · Vascular is onboard, s/p angio 8/21, now cleared for surgery  · Podiatry is onboard, planning for OR  · Remains hemodynamically stable, afebrile, no leukocytosis

## 2019-08-22 NOTE — ASSESSMENT & PLAN NOTE
Hyponatremia likely in setting of diuretic use, corrected with IV fluids and TSH is normal    Results from last 7 days   Lab Units 08/22/19  0541 08/21/19  0443 08/20/19  0644 08/19/19  1259   SODIUM mmol/L 135* 134* 135* 129*

## 2019-08-22 NOTE — PHYSICAL THERAPY NOTE
Physical Therapy Cancellation Note    PT order received  Chart review performed which reveals pt is now cleared for podiatry intervention per vascular  PT discussed with RN Viviane Yun- reports plan is now awaiting determination for when podiatry will schedule surgery  At this time, PT evaluation cancelled  PT will follow and evaluate as appropriate post op with appropriate WBing orders      Jenna Ramon, PT, DPT

## 2019-08-22 NOTE — ASSESSMENT & PLAN NOTE
Wt Readings from Last 3 Encounters:   08/20/19 77 1 kg (170 lb)   05/28/19 80 7 kg (178 lb)   05/08/19 72 6 kg (160 lb)     · Chronic diastolic CHF on furosemide    Previously on lisinopril but no longer taking

## 2019-08-22 NOTE — PLAN OF CARE
Problem: Nutrition/Hydration-ADULT  Goal: Nutrient/Hydration intake appropriate for improving, restoring or maintaining nutritional needs  Description  Monitor and assess patient's nutrition/hydration status for malnutrition  Collaborate with interdisciplinary team and initiate plan and interventions as ordered  Monitor patient's weight and dietary intake as ordered or per policy  Utilize nutrition screening tool and intervene as necessary  Determine patient's food preferences and provide high-protein, high-caloric foods as appropriate       INTERVENTIONS:  - Monitor oral intake, urinary output, labs, and treatment plans  - Assess nutrition and hydration status and recommend course of action  - Evaluate amount of meals eaten  - Assist patient with eating if necessary   - Allow adequate time for meals  - Recommend/ encourage appropriate diets, oral nutritional supplements, and vitamin/mineral supplements  - Order, calculate, and assess calorie counts as needed  - Recommend, monitor, and adjust tube feedings and TPN/PPN based on assessed needs  - Assess need for intravenous fluids  - Provide specific nutrition/hydration education as appropriate  - Include patient/family/caregiver in decisions related to nutrition  Outcome: Progressing     Problem: PAIN - ADULT  Goal: Verbalizes/displays adequate comfort level or baseline comfort level  Description  Interventions:  - Encourage patient to monitor pain and request assistance  - Assess pain using appropriate pain scale  - Administer analgesics based on type and severity of pain and evaluate response  - Implement non-pharmacological measures as appropriate and evaluate response  - Consider cultural and social influences on pain and pain management  - Notify physician/advanced practitioner if interventions unsuccessful or patient reports new pain  Outcome: Progressing  Note:   Patient reports no pain     Problem: INFECTION - ADULT  Goal: Absence or prevention of progression during hospitalization  Description  INTERVENTIONS:  - Assess and monitor for signs and symptoms of infection  - Monitor lab/diagnostic results  - Monitor all insertion sites, i e  indwelling lines, tubes, and drains  - Monitor endotracheal if appropriate and nasal secretions for changes in amount and color  - [place] appropriate cooling/warming therapies per order  - Administer medications as ordered  - Instruct and encourage patient and family to use good hand hygiene technique  - Identify and instruct in appropriate isolation precautions for identified infection/condition  Outcome: Progressing     Problem: SAFETY ADULT  Goal: Patient will remain free of falls  Description  INTERVENTIONS:  - Assess patient frequently for physical needs  -  Identify cognitive and physical deficits and behaviors that affect risk of falls    -  [place] fall precautions as indicated by assessment   - Educate patient/family on patient safety including physical limitations  - Instruct patient to call for assistance with activity based on assessment  - Modify environment to reduce risk of injury  - Consider OT/PT consult to assist with strengthening/mobility  Outcome: Progressing  Goal: Maintain or return to baseline ADL function  Description  INTERVENTIONS:  -  Assess patient's ability to carry out ADLs; assess patient's baseline for ADL function and identify physical deficits which impact ability to perform ADLs (bathing, care of mouth/teeth, toileting, grooming, dressing, etc )  - Assess/evaluate cause of self-care deficits   - Assess range of motion  - Assess patient's mobility; develop plan if impaired  - Assess patient's need for assistive devices and provide as appropriate  - Encourage maximum independence but intervene and supervise when necessary  - Involve family in performance of ADLs  - Assess for home care needs following discharge   - Consider OT consult to assist with ADL evaluation and planning for discharge  - Provide patient education as appropriate  Outcome: Progressing  Goal: Maintain or return mobility status to optimal level  Description  INTERVENTIONS:  - Assess patient's baseline mobility status (ambulation, transfers, stairs, etc )    - Identify cognitive and physical deficits and behaviors that affect mobility  - Identify mobility aids required to assist with transfers and/or ambulation (gait belt, sit-to-stand, lift, walker, cane, etc )  - [place] fall precautions as indicated by assessment  - Record patient progress and toleration of activity level on Mobility SBAR; progress patient to next Phase/Stage  - Instruct patient to call for assistance with activity based on assessment  - Consider rehabilitation consult to assist with strengthening/weightbearing, etc   Outcome: Progressing     Problem: DISCHARGE PLANNING  Goal: Discharge to home or other facility with appropriate resources  Description  INTERVENTIONS:  - Identify barriers to discharge w/patient and caregiver  - Arrange for needed discharge resources and transportation as appropriate  - Identify discharge learning needs (meds, wound care, etc )  - Arrange for interpretive services to assist at discharge as needed  - Refer to Case Management Department for coordinating discharge planning if the patient needs post-hospital services based on physician/advanced practitioner order or complex needs related to functional status, cognitive ability, or social support system  Outcome: Progressing     Problem: Knowledge Deficit  Goal: Patient/family/caregiver demonstrates understanding of disease process, treatment plan, medications, and discharge instructions  Description  Complete learning assessment and assess knowledge base    Interventions:  - Provide teaching at level of understanding  - Provide teaching via preferred learning methods  Outcome: Progressing     Problem: Potential for Falls  Goal: Patient will remain free of falls  Description  INTERVENTIONS:  - Assess patient frequently for physical needs  -  Identify cognitive and physical deficits and behaviors that affect risk of falls    -  [place] fall precautions as indicated by assessment   - Educate patient/family on patient safety including physical limitations  - Instruct patient to call for assistance with activity based on assessment  - Modify environment to reduce risk of injury  - Consider OT/PT consult to assist with strengthening/mobility  Outcome: Progressing

## 2019-08-22 NOTE — ASSESSMENT & PLAN NOTE
· CKD 3 likely secondary to diabetes mellitus  Appears to be at baseline       Results from last 7 days   Lab Units 08/22/19  0541 08/21/19  0443 08/20/19  0644 08/19/19  1259   BUN mg/dL 13 12 13 18   CREATININE mg/dL 1 09 1 14 1 06 1 36*   EGFR ml/min/1 73sq m 59 56 61 45

## 2019-08-23 ENCOUNTER — APPOINTMENT (INPATIENT)
Dept: RADIOLOGY | Facility: HOSPITAL | Age: 84
DRG: 617 | End: 2019-08-23
Payer: MEDICARE

## 2019-08-23 ENCOUNTER — ANESTHESIA (INPATIENT)
Dept: PERIOP | Facility: HOSPITAL | Age: 84
DRG: 617 | End: 2019-08-23
Payer: MEDICARE

## 2019-08-23 LAB
BACTERIA WND AEROBE CULT: ABNORMAL
BASOPHILS # BLD AUTO: 0.04 THOUSANDS/ΜL (ref 0–0.1)
BASOPHILS NFR BLD AUTO: 0 % (ref 0–1)
EOSINOPHIL # BLD AUTO: 1.08 THOUSAND/ΜL (ref 0–0.61)
EOSINOPHIL NFR BLD AUTO: 12 % (ref 0–6)
ERYTHROCYTE [DISTWIDTH] IN BLOOD BY AUTOMATED COUNT: 13.2 % (ref 11.6–15.1)
GLUCOSE SERPL-MCNC: 184 MG/DL (ref 65–140)
GLUCOSE SERPL-MCNC: 199 MG/DL (ref 65–140)
GLUCOSE SERPL-MCNC: 199 MG/DL (ref 65–140)
GLUCOSE SERPL-MCNC: 328 MG/DL (ref 65–140)
GLUCOSE SERPL-MCNC: 350 MG/DL (ref 65–140)
GRAM STN SPEC: ABNORMAL
HCT VFR BLD AUTO: 37.2 % (ref 36.5–49.3)
HGB BLD-MCNC: 12.2 G/DL (ref 12–17)
IMM GRANULOCYTES # BLD AUTO: 0.1 THOUSAND/UL (ref 0–0.2)
IMM GRANULOCYTES NFR BLD AUTO: 1 % (ref 0–2)
LYMPHOCYTES # BLD AUTO: 1.78 THOUSANDS/ΜL (ref 0.6–4.47)
LYMPHOCYTES NFR BLD AUTO: 19 % (ref 14–44)
MCH RBC QN AUTO: 30.6 PG (ref 26.8–34.3)
MCHC RBC AUTO-ENTMCNC: 32.8 G/DL (ref 31.4–37.4)
MCV RBC AUTO: 93 FL (ref 82–98)
MONOCYTES # BLD AUTO: 0.81 THOUSAND/ΜL (ref 0.17–1.22)
MONOCYTES NFR BLD AUTO: 9 % (ref 4–12)
NEUTROPHILS # BLD AUTO: 5.62 THOUSANDS/ΜL (ref 1.85–7.62)
NEUTS SEG NFR BLD AUTO: 59 % (ref 43–75)
NRBC BLD AUTO-RTO: 0 /100 WBCS
PLATELET # BLD AUTO: 391 THOUSANDS/UL (ref 149–390)
PMV BLD AUTO: 9.2 FL (ref 8.9–12.7)
RBC # BLD AUTO: 3.99 MILLION/UL (ref 3.88–5.62)
WBC # BLD AUTO: 9.43 THOUSAND/UL (ref 4.31–10.16)

## 2019-08-23 PROCEDURE — 85025 COMPLETE CBC W/AUTO DIFF WBC: CPT | Performed by: INTERNAL MEDICINE

## 2019-08-23 PROCEDURE — G8979 MOBILITY GOAL STATUS: HCPCS

## 2019-08-23 PROCEDURE — G8978 MOBILITY CURRENT STATUS: HCPCS

## 2019-08-23 PROCEDURE — 73630 X-RAY EXAM OF FOOT: CPT

## 2019-08-23 PROCEDURE — 87075 CULTR BACTERIA EXCEPT BLOOD: CPT | Performed by: PODIATRIST

## 2019-08-23 PROCEDURE — 87077 CULTURE AEROBIC IDENTIFY: CPT | Performed by: PODIATRIST

## 2019-08-23 PROCEDURE — 99232 SBSQ HOSP IP/OBS MODERATE 35: CPT | Performed by: INTERNAL MEDICINE

## 2019-08-23 PROCEDURE — 97163 PT EVAL HIGH COMPLEX 45 MIN: CPT

## 2019-08-23 PROCEDURE — 0Y6P0Z0 DETACHMENT AT RIGHT 1ST TOE, COMPLETE, OPEN APPROACH: ICD-10-PCS | Performed by: PODIATRIST

## 2019-08-23 PROCEDURE — 87176 TISSUE HOMOGENIZATION CULTR: CPT | Performed by: PODIATRIST

## 2019-08-23 PROCEDURE — 99232 SBSQ HOSP IP/OBS MODERATE 35: CPT | Performed by: PHYSICIAN ASSISTANT

## 2019-08-23 PROCEDURE — 87186 SC STD MICRODIL/AGAR DIL: CPT | Performed by: PODIATRIST

## 2019-08-23 PROCEDURE — 82948 REAGENT STRIP/BLOOD GLUCOSE: CPT

## 2019-08-23 PROCEDURE — 87070 CULTURE OTHR SPECIMN AEROBIC: CPT | Performed by: PODIATRIST

## 2019-08-23 PROCEDURE — 87205 SMEAR GRAM STAIN: CPT | Performed by: PODIATRIST

## 2019-08-23 PROCEDURE — 87147 CULTURE TYPE IMMUNOLOGIC: CPT | Performed by: PODIATRIST

## 2019-08-23 RX ORDER — MAGNESIUM HYDROXIDE 1200 MG/15ML
LIQUID ORAL AS NEEDED
Status: DISCONTINUED | OUTPATIENT
Start: 2019-08-23 | End: 2019-08-23 | Stop reason: HOSPADM

## 2019-08-23 RX ORDER — SODIUM CHLORIDE, SODIUM LACTATE, POTASSIUM CHLORIDE, CALCIUM CHLORIDE 600; 310; 30; 20 MG/100ML; MG/100ML; MG/100ML; MG/100ML
50 INJECTION, SOLUTION INTRAVENOUS CONTINUOUS
Status: DISCONTINUED | OUTPATIENT
Start: 2019-08-23 | End: 2019-08-24

## 2019-08-23 RX ORDER — SODIUM CHLORIDE, SODIUM LACTATE, POTASSIUM CHLORIDE, CALCIUM CHLORIDE 600; 310; 30; 20 MG/100ML; MG/100ML; MG/100ML; MG/100ML
INJECTION, SOLUTION INTRAVENOUS CONTINUOUS PRN
Status: DISCONTINUED | OUTPATIENT
Start: 2019-08-23 | End: 2019-08-23 | Stop reason: SURG

## 2019-08-23 RX ORDER — LANOLIN ALCOHOL/MO/W.PET/CERES
CREAM (GRAM) TOPICAL 2 TIMES DAILY
Status: DISCONTINUED | OUTPATIENT
Start: 2019-08-23 | End: 2019-08-27 | Stop reason: HOSPADM

## 2019-08-23 RX ORDER — AMOXICILLIN AND CLAVULANATE POTASSIUM 875; 125 MG/1; MG/1
1 TABLET, FILM COATED ORAL EVERY 12 HOURS SCHEDULED
Status: COMPLETED | OUTPATIENT
Start: 2019-08-23 | End: 2019-08-26

## 2019-08-23 RX ORDER — ONDANSETRON 2 MG/ML
4 INJECTION INTRAMUSCULAR; INTRAVENOUS ONCE AS NEEDED
Status: DISCONTINUED | OUTPATIENT
Start: 2019-08-23 | End: 2019-08-23 | Stop reason: HOSPADM

## 2019-08-23 RX ORDER — FENTANYL CITRATE/PF 50 MCG/ML
25 SYRINGE (ML) INJECTION
Status: DISCONTINUED | OUTPATIENT
Start: 2019-08-23 | End: 2019-08-23 | Stop reason: HOSPADM

## 2019-08-23 RX ORDER — FENTANYL CITRATE 50 UG/ML
INJECTION, SOLUTION INTRAMUSCULAR; INTRAVENOUS AS NEEDED
Status: DISCONTINUED | OUTPATIENT
Start: 2019-08-23 | End: 2019-08-23 | Stop reason: SURG

## 2019-08-23 RX ORDER — PROPOFOL 10 MG/ML
INJECTION, EMULSION INTRAVENOUS CONTINUOUS PRN
Status: DISCONTINUED | OUTPATIENT
Start: 2019-08-23 | End: 2019-08-23 | Stop reason: SURG

## 2019-08-23 RX ORDER — DOXYCYCLINE HYCLATE 100 MG/1
100 CAPSULE ORAL EVERY 12 HOURS SCHEDULED
Status: COMPLETED | OUTPATIENT
Start: 2019-08-23 | End: 2019-08-26

## 2019-08-23 RX ADMIN — INSULIN LISPRO 6 UNITS: 100 INJECTION, SOLUTION INTRAVENOUS; SUBCUTANEOUS at 21:34

## 2019-08-23 RX ADMIN — INSULIN LISPRO 2 UNITS: 100 INJECTION, SOLUTION INTRAVENOUS; SUBCUTANEOUS at 06:36

## 2019-08-23 RX ADMIN — INSULIN HUMAN 15 UNITS: 100 INJECTION, SUSPENSION SUBCUTANEOUS at 17:58

## 2019-08-23 RX ADMIN — SODIUM CHLORIDE, SODIUM LACTATE, POTASSIUM CHLORIDE, AND CALCIUM CHLORIDE 50 ML/HR: .6; .31; .03; .02 INJECTION, SOLUTION INTRAVENOUS at 11:44

## 2019-08-23 RX ADMIN — FENTANYL CITRATE 25 MCG: 50 INJECTION, SOLUTION INTRAMUSCULAR; INTRAVENOUS at 07:37

## 2019-08-23 RX ADMIN — INSULIN LISPRO 5 UNITS: 100 INJECTION, SOLUTION INTRAVENOUS; SUBCUTANEOUS at 17:27

## 2019-08-23 RX ADMIN — METRONIDAZOLE 500 MG: 500 TABLET ORAL at 15:03

## 2019-08-23 RX ADMIN — CEFTRIAXONE 2000 MG: 2 INJECTION, SOLUTION INTRAVENOUS at 13:00

## 2019-08-23 RX ADMIN — AMOXICILLIN AND CLAVULANATE POTASSIUM 1 TABLET: 875; 125 TABLET, FILM COATED ORAL at 21:28

## 2019-08-23 RX ADMIN — FENTANYL CITRATE 25 MCG: 50 INJECTION, SOLUTION INTRAMUSCULAR; INTRAVENOUS at 07:41

## 2019-08-23 RX ADMIN — Medication: at 15:03

## 2019-08-23 RX ADMIN — HYDROCODONE BITARTRATE AND ACETAMINOPHEN 1 TABLET: 5; 325 TABLET ORAL at 11:44

## 2019-08-23 RX ADMIN — PROPOFOL 30 MCG/KG/MIN: 10 INJECTION, EMULSION INTRAVENOUS at 07:39

## 2019-08-23 RX ADMIN — HEPARIN SODIUM 5000 UNITS: 5000 INJECTION INTRAVENOUS; SUBCUTANEOUS at 15:04

## 2019-08-23 RX ADMIN — SODIUM CHLORIDE, SODIUM LACTATE, POTASSIUM CHLORIDE, AND CALCIUM CHLORIDE: .6; .31; .03; .02 INJECTION, SOLUTION INTRAVENOUS at 07:37

## 2019-08-23 RX ADMIN — INSULIN LISPRO 1 UNITS: 100 INJECTION, SOLUTION INTRAVENOUS; SUBCUTANEOUS at 11:35

## 2019-08-23 RX ADMIN — Medication: at 17:28

## 2019-08-23 RX ADMIN — HEPARIN SODIUM 5000 UNITS: 5000 INJECTION INTRAVENOUS; SUBCUTANEOUS at 21:28

## 2019-08-23 RX ADMIN — DOXYCYCLINE 100 MG: 100 CAPSULE ORAL at 21:28

## 2019-08-23 RX ADMIN — SODIUM CHLORIDE, SODIUM LACTATE, POTASSIUM CHLORIDE, AND CALCIUM CHLORIDE 50 ML/HR: .6; .31; .03; .02 INJECTION, SOLUTION INTRAVENOUS at 01:44

## 2019-08-23 NOTE — ASSESSMENT & PLAN NOTE
· Diabetic foot ulcer has been seen podiatrist Shan Cueto DPM for 3-4 weeks  CT images suggesting osteomyelitis without abscess    Wound culture sent in the emergency department, (+) MRSA  · ID is onboard, Vanco and flagyl  · Vascular is onboard, s/p angio 8/21  · Podiatry is onboard, OR 8/23  · Remains hemodynamically stable, afebrile, no leukocytosis

## 2019-08-23 NOTE — PROGRESS NOTES
Progress Note - Infectious Disease   Evelin Farrell 80 y o  male MRN: [id number]  Unit/Bed#: -01 Encounter: [id number]      Impression/Plan:  1  Diabetic foot ulcer with cellulitis and gangrene   Patient presents at this time with progression of the previously diagnosed diabetic foot ulcer with cellulitis and gangrene   Imaging concerning for osteomyelitis   Patient is fortunately hemodynamically stable   No other prior culture data in our system   Eosinophilia noted on labs  Wound cultures noted to be polymicrobial   Patient is now post debridement the OR with removal of all nonviable tissue  OR cultures pending  Will transition patient to oral Augmentin with doxycycline  Continue to trend fever curve/vitals  Repeat CBC/chemistry tomorrow  Serial foot exams  Ongoing follow-up by Podiatry  Follow up OR cultures  Additional care as per primary  Will plan to treat for total of 7 days through 8/29 for residual SSTI  Further tailor antibiotics based on OR cultures      2  Fifth metatarsal osteomyelitis   Patient noted with progressive diabetic foot ulcer and now osteomyelitis on imaging   Patient has failed outpatient antibiotic therapy possibly with amoxicillin  Wound cultures polymicrobial increasing patient's risk of relapse  Patient now status post amputation  Antibiotics as above  Continue to trend fever curve/vitals  Serial exams  Podiatry evaluation appreciated  Additional care as per primary     3  Leukocytosis and peripheral eosinophilia   Patient with elevated white blood cell count on admission which is likely due to the above   No other obvious sources on exam   White count down trending   Eosinophilia persisted despite stopping Ancef  Creatinine stable  Repeat CBC tomorrow  Antibiotics changed as above  Additional care as above     4  Chronic kidney disease   Patient with underlying chronic kidney disease   Creatinine appears to be close to baseline    Will dose adjust antibiotics as needed  Repeat chemistry tomorrow  Additional care as per primary     5  Poorly controlled diabetes   Patient noted with hemoglobin A1c of 7  Unfortunately this is risk factor for progressing wounds  Glucose management as per primary   Antibiotics as above      6  Sick sinus syndrome with pacemaker   No acute issues noted at pacemaker site  Blood cultures fortunately without growth      7  Reported dementia   Patient seems to have issues related to time  [facility] may have underlying dementia   Currently is fully independent and lives on his own  Placement/service issues as per primary service  Ongoing follow-up by case management  Consider rehab given poor compliance as outpatient     Above plan discussed in detail with the patient and with primary service      ID consult service will formally re-evaluate the patient again on Monday if he is still admitted  Please contact ID attending on call if any additional questions or concerns over the weekend        Antibiotics:  Ceftriaxone/Flagyl/vancomycin  Total antibiotic 5    24 hour events:  No acute events noted overnight on chart review  Patient is currently afebrile  He is without leukocytosis  OR cultures pending  Patient's other vitals are stable  Patient underwent amputation and resection of the 5th metatarsal head and all nonviable tissue as per OR note has been removed  Subjective: On exam after procedure patient denied having any nausea, vomiting, chest pain or shortness of breath  He reports that he has been tolerating antibiotic without acute issue  He has no other complaints at this time      Objective:  Vitals:  Temp:  [96 2 °F (35 7 °C)-99 7 °F (37 6 °C)] 99 7 °F (37 6 °C)  HR:  [] 106  Resp:  [7-22] 22  BP: (101-165)/(54-81) 165/81  SpO2:  [95 %-99 %] 98 %  Temp (24hrs), Av 8 °F (36 6 °C), Min:96 2 °F (35 7 °C), Max:99 7 °F (37 6 °C)  Current: Temperature: 99 7 °F (37 6 °C)    Physical Exam:   General Appearance:  Alert, interactive, nontoxic, no acute distress  Throat: Oropharynx moist without lesions  Lungs:   Clear to auscultation bilaterally; no wheezes, rhonchi or rales; respirations unlabored   Heart:  RRR; no murmur, rub or gallop   Abdomen:   Soft, non-tender, non-distended, positive bowel sounds  Extremities: No clubbing, cyanosis or edema   Skin: No new rashes or lesions  No New draining wounds noted  Surgical site is currently wrapped at this time  Labs, Imaging, & Other studies:   All pertinent labs and imaging studies were personally reviewed  Results from last 7 days   Lab Units 08/23/19  0542 08/22/19  0541 08/21/19  0443   WBC Thousand/uL 9 43 10 10 10 93*   HEMOGLOBIN g/dL 12 2 12 1 12 2   PLATELETS Thousands/uL 391* 394* 377     Results from last 7 days   Lab Units 08/22/19  0541  08/20/19  0644 08/19/19  1259   POTASSIUM mmol/L 4 2   < > 3 8 5 3   CHLORIDE mmol/L 101   < > 103 92*   CO2 mmol/L 26   < > 25 27   BUN mg/dL 13   < > 13 18   CREATININE mg/dL 1 09   < > 1 06 1 36*   EGFR ml/min/1 73sq m 59   < > 61 45   CALCIUM mg/dL 8 3   < > 8 1* 9 2   AST U/L  --   --  11 12   ALT U/L  --   --  8* 12   ALK PHOS U/L  --   --  52 64    < > = values in this interval not displayed  Results from last 7 days   Lab Units 08/19/19  1422 08/19/19  1300   BLOOD CULTURE   --  No Growth at 72 hrs     GRAM STAIN RESULT  Rare Polys*  3+ Gram positive cocci in pairs*  Rare Gram negative rods*  Rare Gram positive rods*  --    WOUND CULTURE  4+ Growth of Methicillin Resistant Staphylococcus aureus*  2+ Growth of Proteus vulgaris group*  3 colonies Klebsiella oxytoca*  --

## 2019-08-23 NOTE — PROGRESS NOTES
Progress Note - Valdene Cabot 11/11/1928, 80 y o  male MRN: [id number]    Unit/Bed#: OR POOL Encounter: [id number]    Primary Care Provider: Sammy Fabry, DPM   Date and time admitted to hospital: 8/19/2019 11:45 AM      * Diabetic foot ulcer ([facility] [address] )  Assessment & Plan  · Diabetic foot ulcer has been seen podiatrist Sammy Fabry, DPM for 3-4 weeks  CT images suggesting osteomyelitis without abscess  Wound culture sent in the emergency department, (+) MRSA  · ID is onboard, Vanco and flagyl  · Vascular is onboard, s/p angio 8/21  · Podiatry is onboard, OR 8/23  · Remains hemodynamically stable, afebrile, no leukocytosis    CAD (coronary artery disease)  Assessment & Plan  · Coronary artery disease with history of CABG  No active chest pain  Continue aspirin, statin therapy recommended based on risk factors, however given age likely of little benefit  · Last cholesterol 05/2019 showing total 170, LDL 93, HDL 48,     CKD stage 3 secondary to diabetes [facility])  Assessment & Plan  · CKD 3 likely secondary to diabetes mellitus  Appears to be at baseline  Results from last 7 days   Lab Units 08/22/19  0541 08/21/19  0443 08/20/19  0644 08/19/19  1259   BUN mg/dL 13 12 13 18   CREATININE mg/dL 1 09 1 14 1 06 1 36*   EGFR ml/min/1 73sq m 59 56 61 45       Type 2 diabetes mellitus with diabetic chronic kidney disease [facility])  Assessment & Plan  Lab Results   Component Value Date    HGBA1C 7 0 (H) 05/10/2019       Recent Labs     08/22/19  1200 08/22/19  1602 08/22/19  2131 08/23/19  0544   POCGLU 248* 221* 237* 199*       · Type 2 diabetes mellitus on 70/30 30 units b i d  - resume at lower dose  · Continue to monitor q i d   Blood glucose and adjust accordingly  · Resume consistent carbohydrate diet    Congestive heart failure (CHF) (HCC)  Assessment & Plan  Wt Readings from Last 3 Encounters:   08/20/19 77 1 kg (170 lb)   05/28/19 80 7 kg (178 lb)   05/08/19 72 6 kg (160 lb)     · Chronic diastolic CHF on furosemide  Previously on lisinopril but no longer taking    Hyponatremia  Assessment & Plan  Hyponatremia likely in setting of diuretic use, corrected with IV fluids and TSH is normal    Results from last 7 days   Lab Units 19  0541 19  0443 19  0644 19  1259   SODIUM mmol/L 135* 134* 135* 129*       Dementia  Assessment & Plan  · Dementia reportedly previously on donepezil  Monitor for any signs of sundowning    SSS (sick sinus syndrome) (HCC)  Assessment & Plan  · Sick sinus syndrome status post pacer    Hyperlipidemia  Assessment & Plan  · Hyperlipidemia previously on pravastatin  Statin likely of little benefit for this advanced age gentleman        VTE Pharmacologic Prophylaxis:   Pharmacologic: Heparin  Mechanical VTE Prophylaxis in Place: No - start SCDs    Patient Centered Rounds: I have performed bedside rounds with nursing staff today  Discussions with Specialists or Other Care Team Provider: podiatry OR note reviewed; discussed with ID regarding treatment course, CM     Education and Discussions with Family / Patient: patient, BEATRIZ Mendoza present at bedside     Time Spent for Care: 20 minutes  More than 50% of total time spent on counseling and coordination of care as described above  Current Length of Stay: 4 day(s)    Current Patient Status: Inpatient   Certification Statement: The patient will continue to require additional inpatient hospital stay due to OR today, PT/OT evals for discharge planning    Discharge Plan: pending - likely to rehab     Code Status: Level 1 - Full Code      Subjective:   Patient seen postop, feeling well  He wants to get out of the hospital as soon as possible  He understands he cannot go home and will need rehab prior to going home  Denies any new complaints, no chest pain or palpitations  No shortness of breath      Objective:     Vitals:   Temp (24hrs), Av 1 °F (36 7 °C), Min:97 5 °F (36 4 °C), Max:98 4 °F (36 9 °C)    Temp:  [97 5 °F (36 4 °C)-98 4 °F (36 9 °C)] 97 5 °F (36 4 °C)  HR:  [63-76] 63  Resp:  [12-18] 12  BP: (130-151)/(59-70) 151/70  SpO2:  [98 %-99 %] 98 %  Body mass index is 25 85 kg/m²  Input and Output Summary (last 24 hours): Intake/Output Summary (Last 24 hours) at 8/23/2019 0757  Last data filed at 8/23/2019 0500  Gross per 24 hour   Intake 600 ml   Output 1900 ml   Net -1300 ml       Physical Exam:     Physical Exam   Constitutional: Vital signs are normal  He appears well-developed and well-nourished  No distress  Cardiovascular: Normal rate, regular rhythm, S1 normal, S2 normal and normal heart sounds  Pulmonary/Chest: Effort normal and breath sounds normal  No respiratory distress  He has no wheezes  He has no rhonchi  He has no rales  Abdominal: Soft  Bowel sounds are normal  He exhibits no distension  There is no tenderness  Musculoskeletal: He exhibits no edema  Neurological: He is alert  Skin:   Dressing to the right foot c/d/i  Dry scab to the scalp - has been present on admit for several months, need derm eval outpatient   Psychiatric: He has a normal mood and affect  Nursing note and vitals reviewed  Additional Data:     Labs:    Results from last 7 days   Lab Units 08/23/19  0542   WBC Thousand/uL 9 43   HEMOGLOBIN g/dL 12 2   HEMATOCRIT % 37 2   PLATELETS Thousands/uL 391*   NEUTROS PCT % 59   LYMPHS PCT % 19   MONOS PCT % 9   EOS PCT % 12*     Results from last 7 days   Lab Units 08/22/19  0541  08/20/19  0644   SODIUM mmol/L 135*   < > 135*   POTASSIUM mmol/L 4 2   < > 3 8   CHLORIDE mmol/L 101   < > 103   CO2 mmol/L 26   < > 25   BUN mg/dL 13   < > 13   CREATININE mg/dL 1 09   < > 1 06   ANION GAP mmol/L 8   < > 7   CALCIUM mg/dL 8 3   < > 8 1*   ALBUMIN g/dL  --   --  2 4*   TOTAL BILIRUBIN mg/dL  --   --  0 40   ALK PHOS U/L  --   --  52   ALT U/L  --   --  8*   AST U/L  --   --  11   GLUCOSE RANDOM mg/dL 182*   < > 73    < > = values in this interval not displayed       Results from last 7 days   Lab Units 08/19/19  1259   INR  1 23*     Results from last 7 days   Lab Units 08/23/19  0544 08/22/19  2131 08/22/19  1602 08/22/19  1200 08/22/19  0547 08/21/19  2033 08/21/19  1556 08/21/19  1357 08/21/19  0457 08/20/19  2042 08/20/19  1534 08/20/19  1201   POC GLUCOSE mg/dl 199* 237* 221* 248* 189* 247* 214* 188* 161* 127 67 70         Results from last 7 days   Lab Units 08/20/19  0644 08/19/19  1719 08/19/19  1259   LACTIC ACID mmol/L 1 0 3 4* 2 9*   PROCALCITONIN ng/ml  --   --  <0 05           * I Have Reviewed All Lab Data Listed Above  * Additional Pertinent Lab Tests Reviewed: All Labs Within Last 24 Hours Reviewed      Recent Cultures (last 7 days):     Results from last 7 days   Lab Units 08/19/19  1422 08/19/19  1300   BLOOD CULTURE   --  No Growth at 72 hrs     GRAM STAIN RESULT  Rare Polys*  3+ Gram positive cocci in pairs*  Rare Gram negative rods*  Rare Gram positive rods*  --    WOUND CULTURE  4+ Growth of Methicillin Resistant Staphylococcus aureus*  2+ Growth of Proteus vulgaris group*  1 colony Klebsiella oxytoca*  --        Last 24 Hours Medication List:     Current Facility-Administered Medications:  [MAR Hold] acetaminophen 650 mg Oral Q6H PRN Angeline Chamberlain DO    [MAR Hold] aspirin 81 mg Oral Daily Ashutosh Camargo DO    [MAR Hold] cefTRIAXone 2,000 mg Intravenous Q24H Estella Malone MD Last Rate: 2,000 mg (08/22/19 1428)   [MAR Hold] docusate sodium 100 mg Oral BID PRN Angeline Chamberlain DO    [MAR Hold] furosemide 20 mg Oral Daily Ashutosh Camargo DO    [facility] Hold] heparin (porcine) 5,000 Units Subcutaneous Q8H [facility] Ashutosh Camargo DO    [MAR Hold] hydrALAZINE 10 mg Intravenous Q4H PRN Angeline Chamberlain DO    [MAR Hold] HYDROcodone-acetaminophen 1 tablet Oral Q4H PRN Ashutosh Camargo DO    [MAR Hold] HYDROmorphone 0 2 mg Intravenous Q4H PRN Ashutosh Camargo DO    [MAR Hold] insulin lispro 1-6 Units Subcutaneous 4x Daily (AC & HS) Ashutosh Camargo DO    insulin NPH 15 Units Subcutaneous BID AC Trupti Rain PA-C    lactated ringers 50 mL/hr Intravenous Continuous Sergo Castaneda MD Last Rate: 50 mL/hr (08/23/19 0144)   [MAR Hold] magnesium hydroxide 30 mL Oral BID PRN Joaquim Lujan MD    [facility] Hold] metroNIDAZOLE 500 mg Oral [facility] Arielle Rodgers MD    [MAR Hold] ondansetron 4 mg Intravenous Q4H PRN Xenia Mock DO    [facility] Hold] sodium chloride (PF) 3 mL Intravenous PRN Xenia Mock, DO    [facility] Hold] vancomycin 20 mg/kg Intravenous Q24H Verónica Vu DO Last Rate: 1,500 mg (08/22/19 0834)     Facility-Administered Medications Ordered in Other Encounters:  fentanyl citrate (PF)  Intravenous PRN Sina Cordero, CRNA    lactated ringers  Intravenous Continuous PRN Sina Cordero, CRNA    propofol  Intravenous Continuous PRN Sina Cordero, CRNA Last Rate: 30 mcg/kg/min (08/23/19 0739)        Today, Patient Was Seen By: Kwasi Aguila PA-C    ** Please Note: Dictation voice to text software may have been used in the creation of this document   **

## 2019-08-23 NOTE — OP NOTE
OPERATIVE REPORT  PATIENT NAME: Jennifer Orozco    :  1928  MRN: [id number]  Pt Location: [facility] OR ROOM 02    SURGERY DATE: 2019    Surgeon(s) and Role:     Tesha Del Angel DPM - Primary    Preop Diagnosis:  Right foot 5th metatarsal head osteomyelitis    Post-Op Diagnosis Codes: With gangrene cause of injury, initial encounter [X58  XXXA]    Procedure  Right foot met head resection with debridement  Specimen(s):  * No specimens in log *    Estimated Blood Loss:   Minimal    Drains:  * No LDAs found *    Anesthesia Type:   IV Sedation with Anesthesia    Operative Indications:    Right foot 5th metatarsal head osteomyelitis with gangrene    Operative Findings:  same    Complications:   None    Procedure and Technique:  Patient was transported to the OR and placed on the table in normal supine position  After adequate IV sedation was achieved to the right foot was injected with 50 50 mixture of 1% lidocaine plain and 0 5% Marcaine plain a total of 10 cc  Patient was then prepped and draped in the usual sterile manner  Attention was then directed to the lateral aspect of the 5th metatarsal with the gangrenous ulcer was located and around the 5th metatarsal head  A 10 blade was used to resect all gangrenous tissue sharply  The incision was then deepened to the level of 5th metatarsal head and neck  All soft tissue structures were reflected away from the 5th metatarsal head and neck  Using a sagittal saw the head of the 5th metatarsal was then resected  The wound was then flushed with copious amount of sterile saline using a pulse lavage  The skin was then reapproximated using 3 0 vicryl in a simple interrupted manner  The wound was then dressed with Xeroform 4 x 4, abd pads, [place] Kerlix and Ace wrap  Patient tolerated all aspects the procedure and anesthesia well with vital signs stable and neurovascular status intact to right lower extremity     I was present for the entire procedure    Patient Disposition:  PACU     SIGNATURE: Rula Weaver DPM  DATE: August 23, 2019  TIME: 7:23 AM

## 2019-08-23 NOTE — ANESTHESIA POSTPROCEDURE EVALUATION
Post-Op Assessment Note    CV Status:  Stable  Pain Score: 0    Pain management: adequate     Mental Status:  Alert and awake   Hydration Status:  Euvolemic   PONV Controlled:  Controlled   Airway Patency:  Patent   Post Op Vitals Reviewed: Yes      Staff: CRNA, Anesthesiologist           BP   101/54   Temp  96 2   Pulse  65   Resp   11   SpO2   95%

## 2019-08-23 NOTE — SOCIAL WORK
ANTHONY spoke with pt's son Jyotsna Ramirez via telephone to discuss PT rec which is STR  During this time, he stated that distance of the SNF is not an issue as they travel from [facility] anyway  ANTHONY reviewed SNF options and Jyotsna Ramirez is okay with referrals being sent to multiple SNFs  He asked ANTHONY to follow up with his brother Sissy Hay at [phone] to review accepting SNFs  SNF referrals placed

## 2019-08-23 NOTE — PROGRESS NOTES
Vancomycin IV Pharmacy-to-Dose Consultation    Nichole Landa is a 80 y o   who is currently receiving Vancomycin IV with management by the Pharmacy Consult service  Assessment/Plan:  The patient was reviewed  Renal function is stable and no signs or symptoms of nephrotoxicity and/or infusion reactions were documented in the chart  Based on todays assessment, continue current vancomycin day # 3 of regimen 1500 mg q24h,  with a plan for trough to be drawn at 0830 on 8/24  Total Days of Vancomycin therapy received to date: 3 days  We will continue to follow the patients culture results and clinical progress daily      Efrain Dhaliwal, Pharmacist

## 2019-08-23 NOTE — CONSULTS
The patient's Vancomycin therapy has been completed / discontinued  Thank you for this consult  Pharmacy will sign-off now

## 2019-08-23 NOTE — ASSESSMENT & PLAN NOTE
Lab Results   Component Value Date    HGBA1C 7 0 (H) 05/10/2019       Recent Labs     08/22/19  1200 08/22/19  1602 08/22/19  2131 08/23/19  0544   POCGLU 248* 221* 237* 199*       · Type 2 diabetes mellitus on 70/30 30 units b i d  - resume at lower dose  · Continue to monitor q i d   Blood glucose and adjust accordingly  · Resume consistent carbohydrate diet

## 2019-08-23 NOTE — PLAN OF CARE
Problem: Nutrition/Hydration-ADULT  Goal: Nutrient/Hydration intake appropriate for improving, restoring or maintaining nutritional needs  Description  Monitor and assess patient's nutrition/hydration status for malnutrition  Collaborate with interdisciplinary team and initiate plan and interventions as ordered  Monitor patient's weight and dietary intake as ordered or per policy  Utilize nutrition screening tool and intervene as necessary  Determine patient's food preferences and provide high-protein, high-caloric foods as appropriate  INTERVENTIONS:  - Monitor oral intake, urinary output, labs, and treatment plans  - Assess nutrition and hydration status and recommend course of action  - Evaluate amount of meals eaten  - Assist patient with eating if necessary   - Allow adequate time for meals  - Recommend/ encourage appropriate diets, oral nutritional supplements, and vitamin/mineral supplements  - Order, calculate, and assess calorie counts as needed  - Recommend, monitor, and adjust tube feedings and TPN/PPN based on assessed needs  - Assess need for intravenous fluids  - Provide specific nutrition/hydration education as appropriate  - Include patient/family/caregiver in decisions related to nutrition  Outcome: Progressing     Problem: PAIN - ADULT  Goal: Verbalizes/displays adequate comfort level or baseline comfort level  Description  Interventions:  - Encourage patient to monitor pain and request assistance  - Assess pain using appropriate pain scale  - Administer analgesics based on type and severity of pain and evaluate response  - Implement non-pharmacological measures as appropriate and evaluate response  - Consider cultural and social influences on pain and pain management  - Notify physician/advanced practitioner if interventions unsuccessful or patient reports new pain  Outcome: Progressing  Note:   No reports of pain       Problem: INFECTION - ADULT  Goal: Absence or prevention of progression during hospitalization  Description  INTERVENTIONS:  - Assess and monitor for signs and symptoms of infection  - Monitor lab/diagnostic results  - Monitor all insertion sites, i e  indwelling lines, tubes, and drains  - Monitor endotracheal if appropriate and nasal secretions for changes in amount and color  - [place] appropriate cooling/warming therapies per order  - Administer medications as ordered  - Instruct and encourage patient and family to use good hand hygiene technique  - Identify and instruct in appropriate isolation precautions for identified infection/condition  Outcome: Progressing     Problem: SAFETY ADULT  Goal: Patient will remain free of falls  Description  INTERVENTIONS:  - Assess patient frequently for physical needs  -  Identify cognitive and physical deficits and behaviors that affect risk of falls    -  [place] fall precautions as indicated by assessment   - Educate patient/family on patient safety including physical limitations  - Instruct patient to call for assistance with activity based on assessment  - Modify environment to reduce risk of injury  - Consider OT/PT consult to assist with strengthening/mobility  Outcome: Progressing  Goal: Maintain or return to baseline ADL function  Description  INTERVENTIONS:  -  Assess patient's ability to carry out ADLs; assess patient's baseline for ADL function and identify physical deficits which impact ability to perform ADLs (bathing, care of mouth/teeth, toileting, grooming, dressing, etc )  - Assess/evaluate cause of self-care deficits   - Assess range of motion  - Assess patient's mobility; develop plan if impaired  - Assess patient's need for assistive devices and provide as appropriate  - Encourage maximum independence but intervene and supervise when necessary  - Involve family in performance of ADLs  - Assess for home care needs following discharge   - Consider OT consult to assist with ADL evaluation and planning for discharge  - Provide patient education as appropriate  Outcome: Progressing  Goal: Maintain or return mobility status to optimal level  Description  INTERVENTIONS:  - Assess patient's baseline mobility status (ambulation, transfers, stairs, etc )    - Identify cognitive and physical deficits and behaviors that affect mobility  - Identify mobility aids required to assist with transfers and/or ambulation (gait belt, sit-to-stand, lift, walker, cane, etc )  - [place] fall precautions as indicated by assessment  - Record patient progress and toleration of activity level on Mobility SBAR; progress patient to next Phase/Stage  - Instruct patient to call for assistance with activity based on assessment  - Consider rehabilitation consult to assist with strengthening/weightbearing, etc   Outcome: Progressing  Note:   Patient able to ambulate from bed to chair     Problem: DISCHARGE PLANNING  Goal: Discharge to home or other facility with appropriate resources  Description  INTERVENTIONS:  - Identify barriers to discharge w/patient and caregiver  - Arrange for needed discharge resources and transportation as appropriate  - Identify discharge learning needs (meds, wound care, etc )  - Arrange for interpretive services to assist at discharge as needed  - Refer to Case Management Department for coordinating discharge planning if the patient needs post-hospital services based on physician/advanced practitioner order or complex needs related to functional status, cognitive ability, or social support system  Outcome: Progressing     Problem: Knowledge Deficit  Goal: Patient/family/caregiver demonstrates understanding of disease process, treatment plan, medications, and discharge instructions  Description  Complete learning assessment and assess knowledge base    Interventions:  - Provide teaching at level of understanding  - Provide teaching via preferred learning methods  Outcome: Progressing     Problem: Potential for Falls  Goal: Patient will remain free of falls  Description  INTERVENTIONS:  - Assess patient frequently for physical needs  -  Identify cognitive and physical deficits and behaviors that affect risk of falls    -  [place] fall precautions as indicated by assessment   - Educate patient/family on patient safety including physical limitations  - Instruct patient to call for assistance with activity based on assessment  - Modify environment to reduce risk of injury  - Consider OT/PT consult to assist with strengthening/mobility  Outcome: Progressing

## 2019-08-23 NOTE — PLAN OF CARE
Problem: PHYSICAL THERAPY ADULT  Goal: Performs mobility at highest level of function for planned discharge setting  See evaluation for individualized goals  Description  Treatment/Interventions: Functional transfer training, LE strengthening/ROM, Elevations, Therapeutic exercise, Endurance training, Patient/family training, Equipment eval/education, Bed mobility, Gait training, Spoke to nursing, Spoke to case management          See flowsheet documentation for full assessment, interventions and recommendations  Note:   Prognosis: Good  Problem List: Decreased strength, Decreased endurance, Impaired balance, Decreased mobility, Decreased skin integrity, Orthopedic restrictions, Pain  Assessment: pt is a 89y/o m who presents to [facility] c osteomyelitis R 5th metatarsal  s/p R 5th metatarsal rsn c debridement  ordered PWB R LE c surgical shoe  PMH significant for DM 2, CHF, CAD, BPH, + Alzheimer's dementia s behavorial disturbance  at baseline, pt mod (I) c functional mobility c SPC outside in community  resides alone in 2 story home c 1st floor set up + 2 SEA  reports having HHA to (A) c household chores  currently requires min (A)x1 to complete mobility tasks 2* deficits in strength, balance, gait quality, pain, skin integrity, WBS, + activity tolerance noted in PT exam above  Barthel Index 50/100  pt maintained PWB R LE 75% of the time c OOB mobility tasks  ambulated 7' c RW limited by pain/fatigue  able to tolerate sitting OOB in chair c chair alarm activated  would benefit from skilled PT to maximize functional mobility + improve quality of life  upon d/c, recommend STR  PT eval of high complexity 2* unstable med status c pt requiring ongoing medical management s/p R 5th metatarsal debridement + rsn  pt PWB R foot c surgical shoe  pt of advanced age c multiple co-morbidites + lives alone in 2 story home  requires (A)x1 to complete mobility tasks 2* mobility deficits above    Barriers to Discharge: Inaccessible home environment, Decreased caregiver support     Recommendation: Short-term skilled PT     PT - OK to Discharge: Yes(to STR when stable)    See flowsheet documentation for full assessment

## 2019-08-23 NOTE — ASSESSMENT & PLAN NOTE
· Coronary artery disease with history of CABG  No active chest pain    Continue aspirin, statin therapy recommended based on risk factors, however given age likely of little benefit  · Last cholesterol 05/2019 showing total 170, LDL 93, HDL 48,

## 2019-08-23 NOTE — ANESTHESIA PREPROCEDURE EVALUATION
Review of Systems/Medical History  Patient summary reviewed  Chart reviewed  No history of anesthetic complications     Cardiovascular  EKG reviewed, Negative cardio ROS Exercise comment: Lived independently prior to admission, walking with cane since his foot started to hurt Pacemaker/AICD, Hyperlipidemia, Hypertension , CAD , History of CABG (>10 years ago), Dysrhythmias (SSS and CHB s/p PPM) , CHF ,   Comment: TTE 2015:  LVEF 50-55%, no RWMA, grade 1 diastolic dysfunction,  Pulmonary  Smoker (quit 40 years ago) ex-smoker  , No recent URI ,        GI/Hepatic      Comment: Confirmed NPO appropriate     Chronic kidney disease stage 3,        Endo/Other  Diabetes type 2 ,   Comment: Admitted 8/19 with diabetic foot ulcer, +MRSA    GYN       Hematology  Negative hematology ROS      Musculoskeletal    Arthritis     Neurology    Diabetic neuropathy,    Psychology   Negative psychology ROS   Dementia (mild Alzheimers)           Physical Exam    Airway    Mallampati score: II  TM Distance: >3 FB  Neck ROM: full     Dental       Cardiovascular  Comment: Negative ROS, Rhythm: regular, Rate: normal,     Pulmonary  Breath sounds clear to auscultation,     Other Findings        Anesthesia Plan  ASA Score- 3     Anesthesia Type- IV sedation with anesthesia with ASA Monitors  Additional Monitors:   Airway Plan:     Comment: I discussed the risks and benefits of IV sedation anesthesia including the possibility of the need to convert to general anesthesia and the potential risk of awareness  The patient was given the opportunity to ask questions, which were answered  Spoke to patient's son, Kya Ching, and specifically discussed with him possibility of post procedure delirium in setting of mild baseline dementia  Plan Factors-    Induction- intravenous  Postoperative Plan-     Informed Consent- Anesthetic plan and risks discussed with patient and son  I personally reviewed this patient with the CRNA   Discussed and agreed on the Anesthesia Plan with the CRNA             Lab Results   Component Value Date    WBC 9 43 08/23/2019    HGB 12 2 08/23/2019    HCT 37 2 08/23/2019    MCV 93 08/23/2019     (H) 08/23/2019     Lab Results   Component Value Date    SODIUM 135 (L) 08/22/2019    K 4 2 08/22/2019     08/22/2019    CO2 26 08/22/2019    BUN 13 08/22/2019    CREATININE 1 09 08/22/2019    GLUC 182 (H) 08/22/2019    CALCIUM 8 3 08/22/2019     Lab Results   Component Value Date    INR 1 23 (H) 08/19/2019    INR 1 18 (H) 11/03/2016    PROTIME 15 6 (H) 08/19/2019    PROTIME 14 9 (H) 11/03/2016     Lab Results   Component Value Date    HGBA1C 7 0 (H) 05/10/2019     Lab Results   Component Value Date    ALT 8 (L) 08/20/2019    AST 11 08/20/2019    ALKPHOS 52 08/20/2019    BILITOT 0 71 10/19/2015

## 2019-08-23 NOTE — PHYSICAL THERAPY NOTE
PT Evaluation (19min)  (11:04-11:23)    Past Medical History:   Diagnosis Date    BPH (benign prostatic hyperplasia)     CAD (coronary artery disease)     [facility]     RESOLVED: [id number]    Dementia     Diabetes mellitus ([facility] )     History of colonoscopy     Hyperlipidemia     SSS (sick sinus syndrome) ([facility] )         08/23/19 1104   Note Type   Note type Eval only   Pain Assessment   Pain Assessment 0-10   Pain Score 2   Pain Type Surgical pain   Pain Location Foot   Pain Orientation Right   Hospital Pain Intervention(s) Ambulation/increased activity   Home Living   Type of [address] Two level; Able to live on main level with bedroom/bathroom; Performs ADLs on one level;Stairs to enter with rails  (2 SEA)   Bathroom Shower/Tub Tub/shower unit   Bathroom Toilet Standard   Bathroom Equipment Shower chair;Commode   Bathroom Accessibility Accessible   Home Equipment Walker;Cane   Prior Function   Level of [place] Independent with ADLs and functional mobility  (ambulates c SPC in community)   Lives With [address] [facility] Help From Other (Comment); Home health; Family  (pt reports having cleaning ladies to (A) c household chores)   ADL Assistance Independent   IADLs Needs assistance   Falls in the last 6 months 1 to 4  (reports 1 fall)   Vocational Retired   Comments (-) drive; A (A) c transportation   Restrictions/Precautions   [place] Bearing Precautions Per Order Yes   RLE Weight Bearing Per Order PWB  (c surgical shoe)   Braces or Orthoses Other (Comment)  (surgical shoe R foot)   Other Precautions Contact/isolation; Chair Alarm; Bed Alarm; Fall Risk;Pain;Telemetry;Multiple lines;WBS;Hard of hearing  (PWB R LE c surgical shoe)   General   Additional Pertinent History pt presents to [facility] c R foot 5th metatarsal head osteomyelitis  s/p R 5th metatarsal head rsn c debridement 8/23/19  ordered PWB R foot c surgical shoe  PT consulted for mobility + d/c planning  up c (A)  Family/Caregiver Present Yes  (pt's dtr in law)   Cognition   Orientation Level Oriented X4   RUE Assessment   RUE Assessment WFL  (4/5)   LUE Assessment   LUE Assessment WFL  (4/5)   RLE Assessment   RLE Assessment WFL  (4/5)   LLE Assessment   LLE Assessment WFL  (4/5)   Coordination   Sensation WFL   Bed Mobility   Supine to Sit 4  Minimal assistance   Additional items Assist x 1;HOB elevated; Increased time required;Verbal cues   Transfers   Sit to Stand 4  Minimal assistance   Additional items Assist x 1;Verbal cues; Increased time required  (maintains PWB R LE 75% of the time)   Stand to Sit 4  Minimal assistance   Additional items Assist x 1;Verbal cues; Increased time required   Ambulation/Elevation   Gait pattern Antalgic;Decreased foot clearance;Decreased R stance; Short stride; Excessively slow   Gait Assistance 4  Minimal assist   Additional items Assist x 1;Verbal cues   Assistive Device Rolling walker   Distance 7'; limited by pain/fatigue   Stair Management Assistance Not tested   Balance   Static Sitting Fair +   Dynamic Sitting Fair +   Static Standing Poor +   Dynamic Standing Poor +   Ambulatory Poor +   Endurance Deficit   Endurance Deficit Yes   Activity Tolerance   Activity Tolerance Patient limited by fatigue;Patient limited by pain   Nurse Made Aware GREGORIO Pimentel notified tp ambulates (A)x1 c RW + requires surgical shoe R foot; pt PWB R LE  Assessment   Prognosis Good   Problem List Decreased strength;Decreased endurance; Impaired balance;Decreased mobility; Decreased skin integrity;Orthopedic restrictions;Pain   Assessment pt is a 89y/o m who presents to [facility] c osteomyelitis R 5th metatarsal  s/p R 5th metatarsal rsn c debridement  ordered PWB R LE c surgical shoe  PMH significant for DM 2, CHF, CAD, BPH, + Alzheimer's dementia s behavorial disturbance  at baseline, pt mod (I) c functional mobility c SPC outside in community  resides alone in 2 story home c 1st floor set up + 2 SEA   reports having HHA to (A) c household chores  currently requires min (A)x1 to complete mobility tasks 2* deficits in strength, balance, gait quality, pain, skin integrity, WBS, + activity tolerance noted in PT exam above  Barthel Index 50/100  pt maintained PWB R LE 75% of the time c OOB mobility tasks  ambulated 7' c RW limited by pain/fatigue  able to tolerate sitting OOB in chair c chair alarm activated  would benefit from skilled PT to maximize functional mobility + improve quality of life  upon d/c, recommend STR  PT eval of high complexity 2* unstable med status c pt requiring ongoing medical management s/p R 5th metatarsal debridement + rsn  pt PWB R foot c surgical shoe  pt of advanced age c multiple co-morbidites + lives alone in 2 story home  requires (A)x1 to complete mobility tasks 2* mobility deficits above  Barriers to Discharge Inaccessible home environment;Decreased caregiver support   Goals   Patient Goals "to finish working on my antique dresser"  STG Expiration Date 09/02/19   Short Term Goal #1 1  increase strength 1/2 grade to improve overall functional mobility, 2  perform bed mobility c (S) to decrease caregiver burden, 3  perform tranfsers c (S) while maintaining PWB R % of the time to safely perform ADLs, 4  ambulate 150' c (S) + RW while maintaining PWB R % of the time to safely navigate home environment, 5  negotiate 2 stairs c (S) to safely enter home c appropriate LE sequencing technique 100% of the time   Plan   Treatment/Interventions Functional transfer training;LE strengthening/ROM; Elevations; Therapeutic exercise; Endurance training;Patient/family training;Equipment eval/education; Bed mobility;Gait training;Spoke to nursing;Spoke to case management   PT Frequency Other (Comment)  (3-5x/wk)   Recommendation   Recommendation Short-term skilled PT   PT - OK to Discharge Yes  (to STR when stable)   Modified [place] Scale   Modified [place] Scale 4   Barthel Index   Feeding 10   Bathing 0 Grooming Score 5   Dressing Score 5   Bladder Score 10   Bowels Score 10   Toilet Use Score 5   Transfers (Bed/Chair) Score 5   Mobility (Level Surface) Score 0   Stairs Score 0   Barthel Index Score 50     Azael Canela, PT, DPT

## 2019-08-24 LAB
ANION GAP SERPL CALCULATED.3IONS-SCNC: 8 MMOL/L (ref 4–13)
BACTERIA BLD CULT: NORMAL
BASOPHILS # BLD AUTO: 0.04 THOUSANDS/ΜL (ref 0–0.1)
BASOPHILS NFR BLD AUTO: 0 % (ref 0–1)
BUN SERPL-MCNC: 12 MG/DL (ref 5–25)
CALCIUM SERPL-MCNC: 8.3 MG/DL (ref 8.3–10.1)
CHLORIDE SERPL-SCNC: 99 MMOL/L (ref 100–108)
CO2 SERPL-SCNC: 25 MMOL/L (ref 21–32)
CREAT SERPL-MCNC: 1.04 MG/DL (ref 0.6–1.3)
EOSINOPHIL # BLD AUTO: 0.78 THOUSAND/ΜL (ref 0–0.61)
EOSINOPHIL NFR BLD AUTO: 6 % (ref 0–6)
ERYTHROCYTE [DISTWIDTH] IN BLOOD BY AUTOMATED COUNT: 13.3 % (ref 11.6–15.1)
GFR SERPL CREATININE-BSD FRML MDRD: 63 ML/MIN/1.73SQ M
GLUCOSE SERPL-MCNC: 239 MG/DL (ref 65–140)
GLUCOSE SERPL-MCNC: 252 MG/DL (ref 65–140)
GLUCOSE SERPL-MCNC: 256 MG/DL (ref 65–140)
GLUCOSE SERPL-MCNC: 260 MG/DL (ref 65–140)
GLUCOSE SERPL-MCNC: 315 MG/DL (ref 65–140)
HCT VFR BLD AUTO: 36.1 % (ref 36.5–49.3)
HGB BLD-MCNC: 12.1 G/DL (ref 12–17)
IMM GRANULOCYTES # BLD AUTO: 0.14 THOUSAND/UL (ref 0–0.2)
IMM GRANULOCYTES NFR BLD AUTO: 1 % (ref 0–2)
LYMPHOCYTES # BLD AUTO: 1.32 THOUSANDS/ΜL (ref 0.6–4.47)
LYMPHOCYTES NFR BLD AUTO: 10 % (ref 14–44)
MCH RBC QN AUTO: 31.2 PG (ref 26.8–34.3)
MCHC RBC AUTO-ENTMCNC: 33.5 G/DL (ref 31.4–37.4)
MCV RBC AUTO: 93 FL (ref 82–98)
MONOCYTES # BLD AUTO: 1.35 THOUSAND/ΜL (ref 0.17–1.22)
MONOCYTES NFR BLD AUTO: 11 % (ref 4–12)
NEUTROPHILS # BLD AUTO: 9.19 THOUSANDS/ΜL (ref 1.85–7.62)
NEUTS SEG NFR BLD AUTO: 72 % (ref 43–75)
NRBC BLD AUTO-RTO: 0 /100 WBCS
PLATELET # BLD AUTO: 389 THOUSANDS/UL (ref 149–390)
PMV BLD AUTO: 9.3 FL (ref 8.9–12.7)
POTASSIUM SERPL-SCNC: 4.3 MMOL/L (ref 3.5–5.3)
RBC # BLD AUTO: 3.88 MILLION/UL (ref 3.88–5.62)
SODIUM SERPL-SCNC: 132 MMOL/L (ref 136–145)
WBC # BLD AUTO: 12.82 THOUSAND/UL (ref 4.31–10.16)

## 2019-08-24 PROCEDURE — 99232 SBSQ HOSP IP/OBS MODERATE 35: CPT | Performed by: PHYSICIAN ASSISTANT

## 2019-08-24 PROCEDURE — 82948 REAGENT STRIP/BLOOD GLUCOSE: CPT

## 2019-08-24 PROCEDURE — 85025 COMPLETE CBC W/AUTO DIFF WBC: CPT | Performed by: PHYSICIAN ASSISTANT

## 2019-08-24 PROCEDURE — 80048 BASIC METABOLIC PNL TOTAL CA: CPT | Performed by: PHYSICIAN ASSISTANT

## 2019-08-24 RX ADMIN — HEPARIN SODIUM 5000 UNITS: 5000 INJECTION INTRAVENOUS; SUBCUTANEOUS at 21:22

## 2019-08-24 RX ADMIN — INSULIN LISPRO 3 UNITS: 100 INJECTION, SOLUTION INTRAVENOUS; SUBCUTANEOUS at 11:57

## 2019-08-24 RX ADMIN — Medication: at 17:32

## 2019-08-24 RX ADMIN — AMOXICILLIN AND CLAVULANATE POTASSIUM 1 TABLET: 875; 125 TABLET, FILM COATED ORAL at 21:22

## 2019-08-24 RX ADMIN — ASPIRIN 81 MG: 81 TABLET, COATED ORAL at 08:14

## 2019-08-24 RX ADMIN — HEPARIN SODIUM 5000 UNITS: 5000 INJECTION INTRAVENOUS; SUBCUTANEOUS at 17:32

## 2019-08-24 RX ADMIN — INSULIN HUMAN 15 UNITS: 100 INJECTION, SUSPENSION SUBCUTANEOUS at 08:00

## 2019-08-24 RX ADMIN — INSULIN LISPRO 5 UNITS: 100 INJECTION, SOLUTION INTRAVENOUS; SUBCUTANEOUS at 17:00

## 2019-08-24 RX ADMIN — DOXYCYCLINE 100 MG: 100 CAPSULE ORAL at 08:14

## 2019-08-24 RX ADMIN — INSULIN LISPRO 3 UNITS: 100 INJECTION, SOLUTION INTRAVENOUS; SUBCUTANEOUS at 08:00

## 2019-08-24 RX ADMIN — INSULIN LISPRO 3 UNITS: 100 INJECTION, SOLUTION INTRAVENOUS; SUBCUTANEOUS at 21:22

## 2019-08-24 RX ADMIN — SODIUM CHLORIDE, SODIUM LACTATE, POTASSIUM CHLORIDE, AND CALCIUM CHLORIDE 50 ML/HR: .6; .31; .03; .02 INJECTION, SOLUTION INTRAVENOUS at 05:36

## 2019-08-24 RX ADMIN — AMOXICILLIN AND CLAVULANATE POTASSIUM 1 TABLET: 875; 125 TABLET, FILM COATED ORAL at 08:14

## 2019-08-24 RX ADMIN — DOXYCYCLINE 100 MG: 100 CAPSULE ORAL at 21:22

## 2019-08-24 RX ADMIN — Medication: at 08:15

## 2019-08-24 RX ADMIN — HEPARIN SODIUM 5000 UNITS: 5000 INJECTION INTRAVENOUS; SUBCUTANEOUS at 05:33

## 2019-08-24 RX ADMIN — FUROSEMIDE 20 MG: 20 TABLET ORAL at 08:14

## 2019-08-24 NOTE — ASSESSMENT & PLAN NOTE
Hyponatremia likely in setting of diuretic use, corrected with IV fluids and TSH is normal    Decrease from 132 from 135 yesterday likely in setting of hyperglycemia as glucose 256 (corrected sodium for hyperglycemia is 134-136)      Results from last 7 days   Lab Units 08/24/19  0441 08/22/19  0541 08/21/19  0443 08/20/19  0644 08/19/19  1259   SODIUM mmol/L 132* 135* 134* 135* 129*

## 2019-08-24 NOTE — PROGRESS NOTES
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  CT images suggesting osteomyelitis without abscess  Wound culture sent in the emergency department, (+) MRSA  Patient is s/p OR 8/23 for Right foot metatarsal head resection with debridement  · ID input appreciated: Vanco and Flagyl IV transitioned to Augmentin and Doxycycline po through 8/29  · Vascular input appreciated: s/p angio 8/21  · Podiatry input appreciated: s/p OR 8/23, serial foot exams  · Remains hemodynamically stable, afebrile  Monitor CBC  Follow up OR cultures  Hyponatremia  Assessment & Plan  Hyponatremia likely in setting of diuretic use, corrected with IV fluids and TSH is normal    Decrease from 132 from 135 yesterday likely in setting of hyperglycemia as glucose 256 (corrected sodium for hyperglycemia is 134-136)  Results from last 7 days   Lab Units 08/24/19  0441 08/22/19  0541 08/21/19  0443 08/20/19  0644 08/19/19  1259   SODIUM mmol/L 132* 135* 134* 135* 129*       CKD stage 3 secondary to diabetes [facility])  Assessment & Plan  · CKD 3 likely secondary to diabetes mellitus  Appears to be at baseline  Results from last 7 days   Lab Units 08/24/19  0441 08/22/19  0541 08/21/19  0443 08/20/19  0644 08/19/19  1259   BUN mg/dL 12 13 12 13 18   CREATININE mg/dL 1 04 1 09 1 14 1 06 1 36*   EGFR ml/min/1 73sq m 63 59 56 61 39       BPH (benign prostatic hyperplasia)  Assessment & Plan  · Reported history of BPH without active symptoms  CAD (coronary artery disease)  Assessment & Plan  · Coronary artery disease with history of CABG  No active chest pain    Continue aspirin, statin therapy recommended based on risk factors, however given age likely of little benefit  · Last cholesterol 05/2019 showing total 170, LDL 93, HDL 48,   Dementia  Assessment & Plan  · Dementia reportedly previously on Donepezil  Monitor for any signs of sundowning  SSS (sick sinus syndrome) (HCC)  Assessment & Plan  · Sick sinus syndrome status post pacemaker  Congestive heart failure (CHF) [facility])  Assessment & Plan  Wt Readings from Last 3 Encounters:   08/20/19 77 1 kg (170 lb)   05/28/19 80 7 kg (178 lb)   05/08/19 72 6 kg (160 lb)     · Chronic diastolic CHF on Furosemide  · Monitor I&Os, daily weights  Hyperlipidemia  Assessment & Plan  · Hyperlipidemia, previously on pravastatin  Statin likely of little benefit for this advanced age gentleman  Type 2 diabetes mellitus with diabetic chronic kidney disease [facility])  Assessment & Plan  Lab Results   Component Value Date    HGBA1C 7 0 (H) 05/10/2019       Recent Labs     08/23/19  1134 08/23/19  1625 08/23/19  2124 08/24/19  0645   POCGLU 184* 328* 350* 252*       · Type 2 diabetes mellitus on 70/30 30 units BID - resumed at lower dose of 15 units previously with ongoing hyperglycemia - will increase back to home dose  · Continue to monitor QID blood glucose and adjust accordingly  · Resume consistent carbohydrate diet      VTE Pharmacologic Prophylaxis:   Pharmacologic: Heparin  Mechanical VTE Prophylaxis in Place: Yes    Patient Centered Rounds: I discussed with nurse outside of patient's room  Discussions with Specialists or Other Care Team Provider: Appreciate Podiatry and ID input  Education and Discussions with Family / Patient: Patient  Time Spent for Care: 30 minutes  More than 50% of total time spent on counseling and coordination of care as described above  Current Length of Stay: 5 day(s)    Current Patient Status: Inpatient   Certification Statement: The patient will continue to require additional inpatient hospital stay due to management of above      Discharge Plan: Not medically cleared  Code Status: Level 1 - Full Code      Subjective:   Patient without complaints  States he is hungry and wants to eat breakfast   No foot pain  No fever/chills  No SOB  No abdominal pain  Objective:     Vitals:   Temp (24hrs), Av 7 °F (37 1 °C), Min:97 6 °F (36 4 °C), Max:99 7 °F (37 6 °C)    Temp:  [97 6 °F (36 4 °C)-99 7 °F (37 6 °C)] 97 6 °F (36 4 °C)  HR:  [] 79  Resp:  [18] 18  BP: (137-165)/(60-81) 148/70  SpO2:  [97 %-98 %] 98 %  Body mass index is 25 85 kg/m²  Input and Output Summary (last 24 hours): Intake/Output Summary (Last 24 hours) at 2019 1021  Last data filed at 2019 0600  Gross per 24 hour   Intake 2180 ml   Output 950 ml   Net 1230 ml       Physical Exam:     Physical Exam   Constitutional: No distress  HENT:   Head: Normocephalic and atraumatic  Eyes: No scleral icterus  Cardiovascular: Normal rate and regular rhythm  Pulmonary/Chest: Effort normal  No respiratory distress  He has no wheezes  Abdominal: Soft  Bowel sounds are normal  He exhibits no distension  There is no tenderness  Musculoskeletal:   Right foot dressing C/D/I  Neurological: He is alert  Skin: He is not diaphoretic  Vitals reviewed  Additional Data:     Labs:    Results from last 7 days   Lab Units 19  0441   WBC Thousand/uL 12 82*   HEMOGLOBIN g/dL 12 1   HEMATOCRIT % 36 1*   PLATELETS Thousands/uL 389   NEUTROS PCT % 72   LYMPHS PCT % 10*   MONOS PCT % 11   EOS PCT % 6     Results from last 7 days   Lab Units 19  0441  19  0644   POTASSIUM mmol/L 4 3   < > 3 8   CHLORIDE mmol/L 99*   < > 103   CO2 mmol/L 25   < > 25   BUN mg/dL 12   < > 13   CREATININE mg/dL 1 04   < > 1 06   CALCIUM mg/dL 8 3   < > 8 1*   ALK PHOS U/L  --   --  52   ALT U/L  --   --  8*   AST U/L  --   --  11    < > = values in this interval not displayed       Results from last 7 days   Lab Units 19  1259   INR  1 23*       * I Have Reviewed All Lab Data Listed Above   * Additional Pertinent Lab Tests Reviewed: All Labs Within Last 24 Hours Reviewed    Imaging:    Imaging Reports Reviewed Today Include: No new imaging  Recent Cultures (last 7 days):     Results from last 7 days   Lab Units 08/23/19  0806 08/19/19  1422 08/19/19  1300   BLOOD CULTURE   --   --  No Growth After 4 Days  GRAM STAIN RESULT  No Polys  No organisms seen Rare Polys*  3+ Gram positive cocci in pairs*  Rare Gram negative rods*  Rare Gram positive rods*  --    WOUND CULTURE   --  4+ Growth of Methicillin Resistant Staphylococcus aureus*  2+ Growth of Proteus vulgaris group*  3 colonies Klebsiella oxytoca*  --        Last 24 Hours Medication List:     Current Facility-Administered Medications:  acetaminophen 650 mg Oral Q6H PRN Laura Loyola PA-C   amoxicillin-clavulanate 1 tablet Oral Q12H [address] Sherice Venegas MD   aspirin 81 mg Oral Daily Trupti Rain PA-C   docusate sodium 100 mg Oral BID PRN Laura Loyola PA-C   doxycycline hyclate 100 mg Oral Q12H [address] Sherice Venegas MD   furosemide 20 mg Oral Daily Truptijess Rain PA-C   heparin (porcine) 5,000 Units Subcutaneous Q8H [address] Trupti Rain PA-C   hydrALAZINE 10 mg Intravenous Q4H PRN Laura Loyola PA-C   HYDROcodone-acetaminophen 1 tablet Oral Q4H PRN KEVIN Dorsey-LUCRECIA   HYDROmorphone 0 2 mg Intravenous Q4H PRN Trupti Rain PA-C   insulin lispro 1-6 Units Subcutaneous 4x Daily (AC & HS) Trupti Rain PA-C   insulin NPH 30 Units Subcutaneous BID AC Tali Leary PA-C   ondansetron 4 mg Intravenous Q4H PRN Trupti Rain PA-C   sodium chloride (PF) 3 mL Intravenous PRN Trupti Rain PA-C   white petrolatum-mineral oil  Topical BID Laura Loyola PA-C        Today, Patient Was Seen By: Sara Aguero PA-C    ** Please Note: Dictation voice to text software may have been used in the creation of this document   **

## 2019-08-24 NOTE — ASSESSMENT & PLAN NOTE
· Hyperlipidemia, previously on pravastatin  Statin likely of little benefit for this advanced age gentleman

## 2019-08-24 NOTE — ASSESSMENT & PLAN NOTE
Lab Results   Component Value Date    HGBA1C 7 0 (H) 05/10/2019       Recent Labs     08/23/19  1134 08/23/19  1625 08/23/19  2124 08/24/19  0645   POCGLU 184* 328* 350* 252*       · Type 2 diabetes mellitus on 70/30 30 units BID - resumed at lower dose of 15 units previously with ongoing hyperglycemia - will increase back to home dose  · Continue to monitor QID blood glucose and adjust accordingly    · Resume consistent carbohydrate diet

## 2019-08-24 NOTE — ASSESSMENT & PLAN NOTE
· Diabetic foot ulcer with celluitis and gangrene  CT images suggesting osteomyelitis without abscess  Wound culture sent in the emergency department, (+) MRSA  Patient is s/p OR 8/23 for Right foot metatarsal head resection with debridement  · ID input appreciated: Vanco and Flagyl IV transitioned to Augmentin and Doxycycline po through 8/29  · Vascular input appreciated: s/p angio 8/21  · Podiatry input appreciated: s/p OR 8/23, serial foot exams  · Remains hemodynamically stable, afebrile  Monitor CBC  Follow up OR cultures

## 2019-08-24 NOTE — PROGRESS NOTES
Progress note- Podiatry   Mary Marin 80 y o  male MRN: [id number]  Unit/Bed#: -01 Encounter: [id number]        History of Present Illness   Physician Requesting Consult: Wenceslao Sol MD  Reason for Consult / Principal Problem:  Right foot gangrene and 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  HPI: Mary Marin is a 80y o  year old male who presents with right foot 5th met head gangrene and osteomyelitis  Patient had balloon angioplasty and also right foot 5th met head and neck resection yesterday  Patient is a stable with no complaints of pain today  No fevers or chills today    Review of Systems    Historical Information   Past Medical History:   Diagnosis Date    BPH (benign prostatic hyperplasia)     CAD (coronary artery disease)     Congestive heart failure (CHF) ([facility] [address] )     RESOLVED: [id number]    Dementia     Diabetes mellitus (HCC)     History of colonoscopy     Hyperlipidemia     SSS (sick sinus syndrome) ([facility])      Past Surgical History:   Procedure Laterality Date    COLONOSCOPY      RESOLVED: 2016    CORONARY ARTERY BYPASS GRAFT      FOUR-VESSEL BYPASS GRAFT INCLUDING INTERNAL MAMMARY TO LAD; RESOLVED: SEP 2010    IR ABDOMINAL ANGIOGRAPHY  2019     Social History   Social History     Substance and Sexual Activity   Alcohol Use Yes    Comment: RARELY      Social History     Substance and Sexual Activity   Drug Use No     Social History     Tobacco Use   Smoking Status Former Smoker    Packs/day: 1 00    Years: 25 00    Pack years: 25 00    Last attempt to quit: 1973    Years since quittin 6   Smokeless Tobacco Never Used     Family History:   Family History   Problem Relation Age of Onset    No Known Problems Mother        Meds/Allergies   all current active meds have been reviewed, current meds:   Current Facility-Administered Medications   Medication Dose Route Frequency    acetaminophen (TYLENOL) tablet 650 mg  650 mg Oral Q6H PRN    amoxicillin-clavulanate (AUGMENTIN) 875-125 mg per tablet 1 tablet  1 tablet Oral Q12H [address]    aspirin (ECOTRIN LOW STRENGTH) EC tablet 81 mg  81 mg Oral Daily    docusate sodium (COLACE) capsule 100 mg  100 mg Oral BID PRN    doxycycline hyclate (VIBRAMYCIN) capsule 100 mg  100 mg Oral Q12H [address]    furosemide (LASIX) tablet 20 mg  20 mg Oral Daily    heparin (porcine) subcutaneous injection 5,000 Units  5,000 Units Subcutaneous Q8H [address]    hydrALAZINE (APRESOLINE) injection 10 mg  10 mg Intravenous Q4H PRN    HYDROcodone-acetaminophen (NORCO) 5-325 mg per tablet 1 tablet  1 tablet Oral Q4H PRN    HYDROmorphone (DILAUDID) injection 0 2 mg  0 2 mg Intravenous Q4H PRN    insulin lispro (HumaLOG) 100 units/mL subcutaneous injection 1-6 Units  1-6 Units Subcutaneous 4x Daily (AC & HS)    insulin NPH (HumuLIN N,NovoLIN N) 100 Units/mL subcutaneous injection 30 Units  30 Units Subcutaneous BID AC    ondansetron (ZOFRAN) injection 4 mg  4 mg Intravenous Q4H PRN    sodium chloride (PF) 0 9 % injection 3 mL  3 mL Intravenous PRN    white petrolatum-mineral oil (EUCERIN,HYDROCERIN) cream   Topical BID    and PTA meds:    Medications Prior to Admission   Medication    aspirin 81 MG tablet    BD INSULIN SYRINGE U/F 31G X 5/16" 0 5 ML MISC    cholecalciferol (VITAMIN D3) 1,000 units tablet    furosemide (LASIX) 20 mg tablet    metFORMIN (GLUCOPHAGE-XR) 500 mg 24 hr tablet    nitroglycerin (NITROSTAT) 0 4 mg SL tablet    NOVOLIN 70/30 (70-30) 100 UNIT/ML subcutaneous injection    oxybutynin (DITROPAN) 5 mg tablet     No Known Allergies    Objective   Vitals: Blood pressure 146/70, pulse 83, temperature 97 7 °F (36 5 °C), resp  rate 18, height 5' 8" (1 727 m), weight 77 1 kg (170 lb), SpO2 98 %  ,Body mass index is 25 85 kg/m²  Intake/Output Summary (Last 24 hours) at 8/24/2019 1741  Last data filed at 8/24/2019 1650  Gross per 24 hour   Intake 1400 ml   Output 1990 ml   Net -590 ml     I/O last 24 hours:   In: 3070 [P O :1640; I V :1430]  Out: 2590 [Urine:2590]    Invasive Devices     Peripheral Intravenous Line            Peripheral IV 08/23/19 Left Forearm 1 day                Physical Exam    Right foot status post 5th met head resection  Patient is stable with no signs of infection in the right foot  No edema or erythema noted  The sutures are intact  No purulence noted in the drainage  No active bleeding noted  There is mild drainage from yesterday that is mostly serosanguineous   Assessment/Plan     Assessment:  Right foot gangrene and 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 status post resection of 5th met head and debridement  Plan:  Dressing was changed wound site was evaluated right foot  4 x 4 ABD pad [place] and Ace wrap was used to change the dressing  Patient can use his surgical shoe to ambulate to the bathroom and back not excessive amount of walking for 1st couple of days postop  Patient lives alone at home and relies heavily on caregivers to change dressing and to care for him  Patient will mostly benefit from a rehab facility admission for proper wound healing and for him to be able to be monitored more closely  Patient is otherwise stable per Podiatry for discharge  Code Status: Level 1 - Full Code  Advance Directive and Living Will:      Power of : Yes  POLST:      Counseling / Coordination of Care  Total floor / unit time spent today 60 minutes  Greater than 50% of total time was spent with the patient and / or family counseling and / or coordination of care  A description of the counseling / coordination of care:  Right foot gangrene and 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

## 2019-08-24 NOTE — ASSESSMENT & PLAN NOTE
Wt Readings from Last 3 Encounters:   08/20/19 77 1 kg (170 lb)   05/28/19 80 7 kg (178 lb)   05/08/19 72 6 kg (160 lb)     · Chronic diastolic CHF on Furosemide  · Monitor I&Os, daily weights

## 2019-08-24 NOTE — ASSESSMENT & PLAN NOTE
· CKD 3 likely secondary to diabetes mellitus  Appears to be at baseline       Results from last 7 days   Lab Units 08/24/19  0441 08/22/19  0541 08/21/19  0443 08/20/19  0644 08/19/19  1259   BUN mg/dL 12 13 12 13 18   CREATININE mg/dL 1 04 1 09 1 14 1 06 1 36*   EGFR ml/min/1 73sq m 63 59 56 61 45

## 2019-08-25 LAB
ANION GAP SERPL CALCULATED.3IONS-SCNC: 8 MMOL/L (ref 4–13)
BACTERIA SPEC ANAEROBE CULT: NORMAL
BACTERIA TISS AEROBE CULT: ABNORMAL
BACTERIA TISS AEROBE CULT: ABNORMAL
BASOPHILS # BLD AUTO: 0.04 THOUSANDS/ΜL (ref 0–0.1)
BASOPHILS NFR BLD AUTO: 0 % (ref 0–1)
BUN SERPL-MCNC: 10 MG/DL (ref 5–25)
CALCIUM SERPL-MCNC: 8.5 MG/DL (ref 8.3–10.1)
CHLORIDE SERPL-SCNC: 99 MMOL/L (ref 100–108)
CO2 SERPL-SCNC: 24 MMOL/L (ref 21–32)
CREAT SERPL-MCNC: 0.9 MG/DL (ref 0.6–1.3)
EOSINOPHIL # BLD AUTO: 0.94 THOUSAND/ΜL (ref 0–0.61)
EOSINOPHIL NFR BLD AUTO: 7 % (ref 0–6)
ERYTHROCYTE [DISTWIDTH] IN BLOOD BY AUTOMATED COUNT: 13.6 % (ref 11.6–15.1)
GFR SERPL CREATININE-BSD FRML MDRD: 75 ML/MIN/1.73SQ M
GLUCOSE SERPL-MCNC: 108 MG/DL (ref 65–140)
GLUCOSE SERPL-MCNC: 217 MG/DL (ref 65–140)
GLUCOSE SERPL-MCNC: 244 MG/DL (ref 65–140)
GLUCOSE SERPL-MCNC: 277 MG/DL (ref 65–140)
GLUCOSE SERPL-MCNC: 98 MG/DL (ref 65–140)
GRAM STN SPEC: ABNORMAL
GRAM STN SPEC: ABNORMAL
HCT VFR BLD AUTO: 40.8 % (ref 36.5–49.3)
HGB BLD-MCNC: 13.2 G/DL (ref 12–17)
IMM GRANULOCYTES # BLD AUTO: 0.18 THOUSAND/UL (ref 0–0.2)
IMM GRANULOCYTES NFR BLD AUTO: 1 % (ref 0–2)
LYMPHOCYTES # BLD AUTO: 1.55 THOUSANDS/ΜL (ref 0.6–4.47)
LYMPHOCYTES NFR BLD AUTO: 12 % (ref 14–44)
MCH RBC QN AUTO: 30.9 PG (ref 26.8–34.3)
MCHC RBC AUTO-ENTMCNC: 32.4 G/DL (ref 31.4–37.4)
MCV RBC AUTO: 96 FL (ref 82–98)
MONOCYTES # BLD AUTO: 0.98 THOUSAND/ΜL (ref 0.17–1.22)
MONOCYTES NFR BLD AUTO: 8 % (ref 4–12)
NEUTROPHILS # BLD AUTO: 8.96 THOUSANDS/ΜL (ref 1.85–7.62)
NEUTS SEG NFR BLD AUTO: 72 % (ref 43–75)
NRBC BLD AUTO-RTO: 0 /100 WBCS
PLATELET # BLD AUTO: 328 THOUSANDS/UL (ref 149–390)
PMV BLD AUTO: 9.4 FL (ref 8.9–12.7)
POTASSIUM SERPL-SCNC: 3.9 MMOL/L (ref 3.5–5.3)
RBC # BLD AUTO: 4.27 MILLION/UL (ref 3.88–5.62)
SODIUM SERPL-SCNC: 131 MMOL/L (ref 136–145)
WBC # BLD AUTO: 12.65 THOUSAND/UL (ref 4.31–10.16)

## 2019-08-25 PROCEDURE — 85025 COMPLETE CBC W/AUTO DIFF WBC: CPT | Performed by: PHYSICIAN ASSISTANT

## 2019-08-25 PROCEDURE — 82948 REAGENT STRIP/BLOOD GLUCOSE: CPT

## 2019-08-25 PROCEDURE — 99232 SBSQ HOSP IP/OBS MODERATE 35: CPT | Performed by: PHYSICIAN ASSISTANT

## 2019-08-25 PROCEDURE — 80048 BASIC METABOLIC PNL TOTAL CA: CPT | Performed by: PHYSICIAN ASSISTANT

## 2019-08-25 RX ADMIN — DOXYCYCLINE 100 MG: 100 CAPSULE ORAL at 07:47

## 2019-08-25 RX ADMIN — INSULIN LISPRO 4 UNITS: 100 INJECTION, SOLUTION INTRAVENOUS; SUBCUTANEOUS at 11:33

## 2019-08-25 RX ADMIN — HEPARIN SODIUM 5000 UNITS: 5000 INJECTION INTRAVENOUS; SUBCUTANEOUS at 14:00

## 2019-08-25 RX ADMIN — FUROSEMIDE 20 MG: 20 TABLET ORAL at 07:47

## 2019-08-25 RX ADMIN — HEPARIN SODIUM 5000 UNITS: 5000 INJECTION INTRAVENOUS; SUBCUTANEOUS at 06:04

## 2019-08-25 RX ADMIN — AMOXICILLIN AND CLAVULANATE POTASSIUM 1 TABLET: 875; 125 TABLET, FILM COATED ORAL at 22:32

## 2019-08-25 RX ADMIN — Medication: at 17:05

## 2019-08-25 RX ADMIN — HEPARIN SODIUM 5000 UNITS: 5000 INJECTION INTRAVENOUS; SUBCUTANEOUS at 22:32

## 2019-08-25 RX ADMIN — ASPIRIN 81 MG: 81 TABLET, COATED ORAL at 07:47

## 2019-08-25 RX ADMIN — AMOXICILLIN AND CLAVULANATE POTASSIUM 1 TABLET: 875; 125 TABLET, FILM COATED ORAL at 07:47

## 2019-08-25 RX ADMIN — DOXYCYCLINE 100 MG: 100 CAPSULE ORAL at 22:32

## 2019-08-25 RX ADMIN — INSULIN LISPRO 3 UNITS: 100 INJECTION, SOLUTION INTRAVENOUS; SUBCUTANEOUS at 17:00

## 2019-08-25 RX ADMIN — Medication: at 07:53

## 2019-08-25 RX ADMIN — INSULIN LISPRO 2 UNITS: 100 INJECTION, SOLUTION INTRAVENOUS; SUBCUTANEOUS at 22:37

## 2019-08-25 NOTE — ASSESSMENT & PLAN NOTE
Hyponatremia chronic per review  TSH is normal   Monitor BMP      Results from last 7 days   Lab Units 08/25/19  0558 08/24/19  0441 08/22/19  0541 08/21/19  0443 08/20/19  0644 08/19/19  1259   SODIUM mmol/L 131* 132* 135* 134* 135* 129*

## 2019-08-25 NOTE — PROGRESS NOTES
Progress Note - Deyanira Alcantar 11/11/1928, 80 y o  male MRN: [id number]    Unit/Bed#: -Jyothi Encounter: [id number]    Primary Care Provider: Xenia Bajwa DPM   Date and time admitted to hospital: 8/19/2019 11:45 AM        * Diabetic foot ulcer ([address] )  Assessment & Plan  · Diabetic foot ulcer with celluitis and gangrene  CT images suggesting osteomyelitis without abscess  Wound culture sent in the emergency department, (+) MRSA  Patient is s/p OR 8/23 for Right foot metatarsal head resection with debridement  · ID input appreciated: Vanco and Flagyl IV transitioned to Augmentin and Doxycycline po through 8/29  · Vascular input appreciated: s/p angio 8/21  · Podiatry input appreciated: s/p OR 8/23, serial foot exams  · Remains hemodynamically stable, afebrile  Monitor CBC  Follow up final OR cultures  · Planning for STR (awaiting placement)  Type 2 diabetes mellitus with diabetic chronic kidney disease [facility])  Assessment & Plan  Lab Results   Component Value Date    HGBA1C 7 0 (H) 05/10/2019       Recent Labs     08/24/19  1134 08/24/19  1605 08/24/19  2047 08/25/19  0551   POCGLU 239* 315* 260* 108       · Type 2 diabetes mellitus on Novolin 70/30 30 units BID  · Continue to monitor QID blood glucose and adjust accordingly  · Resume consistent carbohydrate diet    CAD (coronary artery disease)  Assessment & Plan  · Coronary artery disease with history of CABG  No active chest pain  Continue aspirin, statin therapy recommended based on risk factors, however given age likely of little benefit  · Last cholesterol 05/2019 showing total 170, LDL 93, HDL 48,   Hyponatremia  Assessment & Plan  Hyponatremia chronic per review  TSH is normal   Monitor BMP      Results from last 7 days   Lab Units 08/25/19  0558 08/24/19  0441 08/22/19  0541 08/21/19  0443 08/20/19  0644 08/19/19  1259   SODIUM mmol/L 131* 132* 135* 134* 135* 129*       BPH (benign prostatic hyperplasia)  Assessment & Plan  · Reported history of BPH without active symptoms  Dementia  Assessment & Plan  · Dementia reportedly previously on Donepezil  Monitor for any signs of sundowning  SSS (sick sinus syndrome) ([facility])  Assessment & Plan  · Sick sinus syndrome status post pacemaker  Congestive heart failure (CHF) [facility])  Assessment & Plan  Wt Readings from Last 3 Encounters:   19 77 1 kg (170 lb)   19 80 7 kg (178 lb)   19 72 6 kg (160 lb)     · Chronic diastolic CHF on Furosemide  · Monitor I&Os, daily weights  Hyperlipidemia  Assessment & Plan  · Hyperlipidemia, previously on pravastatin  Statin likely of little benefit for this advanced age gentleman  VTE Pharmacologic Prophylaxis:   Pharmacologic: Heparin  Mechanical VTE Prophylaxis in Place: Yes    Patient Centered Rounds: I discussed with nurse outside of patient's room  Discussions with Specialists or Other Care Team Provider: Appreciate Podiatry input  Discussed with case management - awaiting to hear from referrals sent to STR  Education and Discussions with Family / Patient: Patient  Time Spent for Care: 30 minutes  More than 50% of total time spent on counseling and coordination of care as described above  Current Length of Stay: 6 day(s)    Current Patient Status: Inpatient   Certification Statement: The patient will continue to require additional inpatient hospital stay due to ID follow up tomorrow  Awaiting STR placement  Discharge Plan: Not medically cleared  Code Status: Level 1 - Full Code      Subjective:   Patient reports he feels well  No foot pain  No fevers  No CP or SOB  No difficulty eating or urinating  No abdominal pain      Objective:     Vitals:   Temp (24hrs), Av °F (36 7 °C), Min:97 6 °F (36 4 °C), Max:98 6 °F (37 °C)    Temp:  [97 6 °F (36 4 °C)-98 6 °F (37 °C)] 97 6 °F (36 4 °C)  HR:  [77-95] 77  Resp:  [18] 18  BP: (142-147)/(67-74) 142/67  SpO2:  [98 %-99 %] 98 %  Body mass index is 25 85 kg/m²  Input and Output Summary (last 24 hours): Intake/Output Summary (Last 24 hours) at 8/25/2019 1103  Last data filed at 8/25/2019 1006  Gross per 24 hour   Intake 1080 ml   Output 2240 ml   Net -1160 ml       Physical Exam:     Physical Exam   Constitutional: He is oriented to person, place, and time  No distress  HENT:   Head: Normocephalic and atraumatic  Eyes: No scleral icterus  Cardiovascular: Normal rate and regular rhythm  Pulmonary/Chest: Effort normal  No respiratory distress  He has no wheezes  He has no rales  Abdominal: Soft  Bowel sounds are normal  There is no tenderness  Musculoskeletal:   Left foot bandaged C/D/I   Neurological: He is alert and oriented to person, place, and time  Skin: He is not diaphoretic  Vitals reviewed  Additional Data:     Labs:    Results from last 7 days   Lab Units 08/25/19  0558   WBC Thousand/uL 12 65*   HEMOGLOBIN g/dL 13 2   HEMATOCRIT % 40 8   PLATELETS Thousands/uL 328   NEUTROS PCT % 72   LYMPHS PCT % 12*   MONOS PCT % 8   EOS PCT % 7*     Results from last 7 days   Lab Units 08/25/19  0558  08/20/19  0644   POTASSIUM mmol/L 3 9   < > 3 8   CHLORIDE mmol/L 99*   < > 103   CO2 mmol/L 24   < > 25   BUN mg/dL 10   < > 13   CREATININE mg/dL 0 90   < > 1 06   CALCIUM mg/dL 8 5   < > 8 1*   ALK PHOS U/L  --   --  52   ALT U/L  --   --  8*   AST U/L  --   --  11    < > = values in this interval not displayed  Results from last 7 days   Lab Units 08/19/19  1259   INR  1 23*       * I Have Reviewed All Lab Data Listed Above  * Additional Pertinent Lab Tests Reviewed: All Labs Within Last 24 Hours Reviewed    Imaging:    Imaging Reports Reviewed Today Include: No new imaging  Recent Cultures (last 7 days):     Results from last 7 days   Lab Units 08/23/19  0806 08/19/19  1422 08/19/19  1300   BLOOD CULTURE   --   --  No Growth After 5 Days     GRAM STAIN RESULT  No Polys  No organisms seen Rare Polys*  3+ Gram positive cocci in pairs*  Rare Gram negative rods*  Rare Gram positive rods*  --    WOUND CULTURE   --  4+ Growth of Methicillin Resistant Staphylococcus aureus*  2+ Growth of Proteus vulgaris group*  3 colonies Klebsiella oxytoca*  --        Last 24 Hours Medication List:     Current Facility-Administered Medications:  acetaminophen 650 mg Oral Q6H PRN Dolores Guardado PA-C   amoxicillin-clavulanate 1 tablet Oral Q12H [address] Kash Webber MD   aspirin 81 mg Oral Daily Trupti Rain PA-C   docusate sodium 100 mg Oral BID PRN Dolores Guardado PA-C   doxycycline hyclate 100 mg Oral Q12H [address] Kash Webber MD   furosemide 20 mg Oral Daily Trupti Rain PA-C   heparin (porcine) 5,000 Units Subcutaneous Q8H [address] Trupti Rain PA-C   hydrALAZINE 10 mg Intravenous Q4H PRN Dolores Guardado PA-C   HYDROcodone-acetaminophen 1 tablet Oral Q4H PRN Dolores Guardado PA-C   HYDROmorphone 0 2 mg Intravenous Q4H PRN Trupti Rain PA-C   insulin lispro 1-6 Units Subcutaneous 4x Daily (AC & HS) Trupti Rain PA-C   insulin NPH 30 Units Subcutaneous BID AC Tali Leary PA-C   ondansetron 4 mg Intravenous Q4H PRN Trupti Rain PA-C   sodium chloride (PF) 3 mL Intravenous PRN Dolores Guardado PA-C   white petrolatum-mineral oil  Topical BID Dolores Guardado PA-C        Today, Patient Was Seen By: Brian Burr PA-C    ** Please Note: Dictation voice to text software may have been used in the creation of this document   **

## 2019-08-25 NOTE — ASSESSMENT & PLAN NOTE
Lab Results   Component Value Date    HGBA1C 7 0 (H) 05/10/2019       Recent Labs     08/24/19  1134 08/24/19  1605 08/24/19 2047 08/25/19  0551   POCGLU 239* 315* 260* 108       · Type 2 diabetes mellitus on Novolin 70/30 30 units BID  · Continue to monitor QID blood glucose and adjust accordingly    · Resume consistent carbohydrate diet

## 2019-08-25 NOTE — ASSESSMENT & PLAN NOTE
· Diabetic foot ulcer with celluitis and gangrene  CT images suggesting osteomyelitis without abscess  Wound culture sent in the emergency department, (+) MRSA  Patient is s/p OR 8/23 for Right foot metatarsal head resection with debridement  · ID input appreciated: Vanco and Flagyl IV transitioned to Augmentin and Doxycycline po through 8/29  · Vascular input appreciated: s/p angio 8/21  · Podiatry input appreciated: s/p OR 8/23, serial foot exams  · Remains hemodynamically stable, afebrile  Monitor CBC  Follow up final OR cultures  · Planning for STR (awaiting placement)

## 2019-08-26 LAB
ANION GAP SERPL CALCULATED.3IONS-SCNC: 9 MMOL/L (ref 4–13)
BASOPHILS # BLD AUTO: 0.06 THOUSANDS/ΜL (ref 0–0.1)
BASOPHILS NFR BLD AUTO: 1 % (ref 0–1)
BUN SERPL-MCNC: 12 MG/DL (ref 5–25)
CALCIUM SERPL-MCNC: 8.6 MG/DL (ref 8.3–10.1)
CHLORIDE SERPL-SCNC: 95 MMOL/L (ref 100–108)
CHLORIDE UR-SCNC: 62 MMOL/L
CO2 SERPL-SCNC: 26 MMOL/L (ref 21–32)
CREAT SERPL-MCNC: 1.09 MG/DL (ref 0.6–1.3)
EOSINOPHIL # BLD AUTO: 1.49 THOUSAND/ΜL (ref 0–0.61)
EOSINOPHIL NFR BLD AUTO: 11 % (ref 0–6)
ERYTHROCYTE [DISTWIDTH] IN BLOOD BY AUTOMATED COUNT: 13.6 % (ref 11.6–15.1)
GFR SERPL CREATININE-BSD FRML MDRD: 59 ML/MIN/1.73SQ M
GLUCOSE SERPL-MCNC: 188 MG/DL (ref 65–140)
GLUCOSE SERPL-MCNC: 192 MG/DL (ref 65–140)
GLUCOSE SERPL-MCNC: 252 MG/DL (ref 65–140)
GLUCOSE SERPL-MCNC: 260 MG/DL (ref 65–140)
HCT VFR BLD AUTO: 42.4 % (ref 36.5–49.3)
HGB BLD-MCNC: 14 G/DL (ref 12–17)
IMM GRANULOCYTES # BLD AUTO: 0.27 THOUSAND/UL (ref 0–0.2)
IMM GRANULOCYTES NFR BLD AUTO: 2 % (ref 0–2)
LYMPHOCYTES # BLD AUTO: 2.05 THOUSANDS/ΜL (ref 0.6–4.47)
LYMPHOCYTES NFR BLD AUTO: 16 % (ref 14–44)
MCH RBC QN AUTO: 30.8 PG (ref 26.8–34.3)
MCHC RBC AUTO-ENTMCNC: 33 G/DL (ref 31.4–37.4)
MCV RBC AUTO: 93 FL (ref 82–98)
MONOCYTES # BLD AUTO: 1.02 THOUSAND/ΜL (ref 0.17–1.22)
MONOCYTES NFR BLD AUTO: 8 % (ref 4–12)
NEUTROPHILS # BLD AUTO: 8.32 THOUSANDS/ΜL (ref 1.85–7.62)
NEUTS SEG NFR BLD AUTO: 62 % (ref 43–75)
NRBC BLD AUTO-RTO: 0 /100 WBCS
OSMOLALITY UR/SERPL-RTO: 285 MMOL/KG (ref 282–298)
OSMOLALITY UR: 433 MMOL/KG
PLATELET # BLD AUTO: 444 THOUSANDS/UL (ref 149–390)
PMV BLD AUTO: 9.4 FL (ref 8.9–12.7)
POTASSIUM SERPL-SCNC: 4.2 MMOL/L (ref 3.5–5.3)
POTASSIUM UR-SCNC: 37 MMOL/L
RBC # BLD AUTO: 4.55 MILLION/UL (ref 3.88–5.62)
SODIUM 24H UR-SCNC: 64 MOL/L
SODIUM SERPL-SCNC: 130 MMOL/L (ref 136–145)
URATE SERPL-MCNC: 5.4 MG/DL (ref 4.2–8)
WBC # BLD AUTO: 13.21 THOUSAND/UL (ref 4.31–10.16)

## 2019-08-26 PROCEDURE — 82948 REAGENT STRIP/BLOOD GLUCOSE: CPT

## 2019-08-26 PROCEDURE — 84133 ASSAY OF URINE POTASSIUM: CPT | Performed by: INTERNAL MEDICINE

## 2019-08-26 PROCEDURE — 84550 ASSAY OF BLOOD/URIC ACID: CPT | Performed by: INTERNAL MEDICINE

## 2019-08-26 PROCEDURE — 80048 BASIC METABOLIC PNL TOTAL CA: CPT | Performed by: PHYSICIAN ASSISTANT

## 2019-08-26 PROCEDURE — 85025 COMPLETE CBC W/AUTO DIFF WBC: CPT | Performed by: PHYSICIAN ASSISTANT

## 2019-08-26 PROCEDURE — 83935 ASSAY OF URINE OSMOLALITY: CPT | Performed by: PHYSICIAN ASSISTANT

## 2019-08-26 PROCEDURE — 99232 SBSQ HOSP IP/OBS MODERATE 35: CPT | Performed by: PHYSICIAN ASSISTANT

## 2019-08-26 PROCEDURE — 99223 1ST HOSP IP/OBS HIGH 75: CPT | Performed by: INTERNAL MEDICINE

## 2019-08-26 PROCEDURE — 83930 ASSAY OF BLOOD OSMOLALITY: CPT | Performed by: PHYSICIAN ASSISTANT

## 2019-08-26 PROCEDURE — 99232 SBSQ HOSP IP/OBS MODERATE 35: CPT | Performed by: INTERNAL MEDICINE

## 2019-08-26 PROCEDURE — 82436 ASSAY OF URINE CHLORIDE: CPT | Performed by: INTERNAL MEDICINE

## 2019-08-26 PROCEDURE — 84300 ASSAY OF URINE SODIUM: CPT | Performed by: PHYSICIAN ASSISTANT

## 2019-08-26 RX ADMIN — Medication: at 17:29

## 2019-08-26 RX ADMIN — HEPARIN SODIUM 5000 UNITS: 5000 INJECTION INTRAVENOUS; SUBCUTANEOUS at 06:26

## 2019-08-26 RX ADMIN — DOXYCYCLINE 100 MG: 100 CAPSULE ORAL at 08:30

## 2019-08-26 RX ADMIN — Medication: at 08:30

## 2019-08-26 RX ADMIN — DOXYCYCLINE 100 MG: 100 CAPSULE ORAL at 21:07

## 2019-08-26 RX ADMIN — INSULIN LISPRO 1 UNITS: 100 INJECTION, SOLUTION INTRAVENOUS; SUBCUTANEOUS at 21:15

## 2019-08-26 RX ADMIN — ASPIRIN 81 MG: 81 TABLET, COATED ORAL at 08:30

## 2019-08-26 RX ADMIN — AMOXICILLIN AND CLAVULANATE POTASSIUM 1 TABLET: 875; 125 TABLET, FILM COATED ORAL at 08:30

## 2019-08-26 RX ADMIN — AMOXICILLIN AND CLAVULANATE POTASSIUM 1 TABLET: 875; 125 TABLET, FILM COATED ORAL at 21:07

## 2019-08-26 RX ADMIN — INSULIN LISPRO 3 UNITS: 100 INJECTION, SOLUTION INTRAVENOUS; SUBCUTANEOUS at 17:28

## 2019-08-26 RX ADMIN — INSULIN LISPRO 3 UNITS: 100 INJECTION, SOLUTION INTRAVENOUS; SUBCUTANEOUS at 13:56

## 2019-08-26 RX ADMIN — HEPARIN SODIUM 5000 UNITS: 5000 INJECTION INTRAVENOUS; SUBCUTANEOUS at 21:07

## 2019-08-26 RX ADMIN — FUROSEMIDE 20 MG: 20 TABLET ORAL at 08:30

## 2019-08-26 RX ADMIN — HEPARIN SODIUM 5000 UNITS: 5000 INJECTION INTRAVENOUS; SUBCUTANEOUS at 13:56

## 2019-08-26 NOTE — ASSESSMENT & PLAN NOTE
· Diabetic foot ulcer with celluitis and gangrene  CT images suggesting osteomyelitis without abscess  Wound culture sent in the emergency department, (+) MRSA  Patient is s/p OR 8/23 for Right foot metatarsal head resection with debridement  OR wound cultures show MRSA and Proteus Vulgaris  · ID input appreciated: Vanco and Flagyl IV transitioned to Augmentin and Doxycycline po through 8/29  · Vascular input appreciated: s/p angio 8/21  · Podiatry input appreciated: s/p OR 8/23, serial foot exams  · Remains hemodynamically stable, afebrile  Monitor CBC - recheck in am as WBC trended up and increasing eos noted  · Planning for STR (awaiting placement)

## 2019-08-26 NOTE — PROGRESS NOTES
Progress Note - Nay Wheeler 11/11/1928, 80 y o  male MRN: [id number]    Unit/Bed#: -Jy[facility]i Encounter: [id number]    Primary Care Provider: Natty Jacob DPM   Date and time admitted to hospital: 8/19/2019 11:45 AM        * Diabetic foot ulcer ([address] )  Assessment & Plan  · Diabetic foot ulcer with celluitis and gangrene  CT images suggesting osteomyelitis without abscess  Wound culture sent in the emergency department, (+) MRSA  Patient is s/p OR 8/23 for Right foot metatarsal head resection with debridement  OR wound cultures show MRSA and Proteus Vulgaris  · ID input appreciated: Vanco and Flagyl IV transitioned to Augmentin and Doxycycline po through 8/29  · Vascular input appreciated: s/p angio 8/21  · Podiatry input appreciated: s/p OR 8/23, serial foot exams  · Remains hemodynamically stable, afebrile  Monitor CBC - recheck in am as WBC trended up and increasing eos noted  · Planning for STR (awaiting placement)  Type 2 diabetes mellitus with diabetic chronic kidney disease [facility])  Assessment & Plan  Lab Results   Component Value Date    HGBA1C 7 0 (H) 05/10/2019       Recent Labs     08/25/19  1132 08/25/19  1608 08/25/19  2047 08/26/19  1153   POCGLU 277* 244* 217* 252*       · Type 2 diabetes mellitus on Novolin 70/30 30 units BID- ongoing hyperglycemia - increase to his home dose of 35 units BID  SSI coverage as well  · Continue to monitor QID blood glucose and adjust accordingly  · Resume consistent carbohydrate diet  CAD (coronary artery disease)  Assessment & Plan  · Coronary artery disease with history of CABG  No active chest pain  Continue aspirin, statin therapy recommended based on risk factors, however given age likely of little benefit  · Last cholesterol 05/2019 showing total 170, LDL 93, HDL 48,   Hyponatremia  Assessment & Plan  Hyponatremia chronic per review  TSH is normal   Sodium continuing to trend back down this admission    Nephrology input appreciated: ?related to poor solute intake as well as underlying CHF/retention of free water  Follow up serum osmolality, urine osmolality, urine sodium, and bladder scans  Na today 130 (132 when corrected for hyperglycemia)  Results from last 7 days   Lab Units 08/26/19  0853 08/25/19  0558 08/24/19  0441 08/22/19  0541 08/21/19  0443 08/20/19  0644 08/19/19  1259   SODIUM mmol/L 130* 131* 132* 135* 134* 135* 129*       CKD stage 3 secondary to diabetes [facility])  Assessment & Plan  · CKD 3 likely secondary to diabetes mellitus  Appears to be at baseline  Results from last 7 days   Lab Units 08/26/19  0853 08/25/19  0558 08/24/19  0441 08/22/19  0541 08/21/19  0443 08/20/19  0644   BUN mg/dL 12 10 12 13 12 13   CREATININE mg/dL 1 09 0 90 1 04 1 09 1 14 1 06   EGFR ml/min/1 73sq m 59 75 63 59 56 61       Dementia  Assessment & Plan  · Dementia reportedly previously on Donepezil  Monitor for any signs of sundowning  SSS (sick sinus syndrome) (HCC)  Assessment & Plan  · Sick sinus syndrome status post pacemaker  Congestive heart failure (CHF) [facility])  Assessment & Plan  Wt Readings from Last 3 Encounters:   08/20/19 77 1 kg (170 lb)   05/28/19 80 7 kg (178 lb)   05/08/19 72 6 kg (160 lb)     · Chronic diastolic CHF on Furosemide  · Monitor I&Os, daily weights  Hyperlipidemia  Assessment & Plan  · Hyperlipidemia, previously on pravastatin  Statin likely of little benefit for this advanced age gentleman  VTE Pharmacologic Prophylaxis:   Pharmacologic: Heparin  Mechanical VTE Prophylaxis in Place: Yes    Patient Centered Rounds: I have performed bedside rounds with nursing staff today  Discussions with Specialists or Other Care Team Provider: Discussed with Nephrology and appreciate ID input  Education and Discussions with Family / Patient: Patient    Time Spent for Care: 30 minutes  More than 50% of total time spent on counseling and coordination of care as described above      Current Length of Stay: 7 day(s)    Current Patient Status: Inpatient   Certification Statement: The patient will continue to require additional inpatient hospital stay due to further monitoring of CBC, hyponatremia, and planning for STR  Discharge Plan: Not medically cleared  Code Status: Level 1 - Full Code      Subjective:   Patient reports he feels well  No foot pain  No fevers or chills  Eating well  No nausea or vomiting  No rashes  No SOB  Objective:     Vitals:   Temp (24hrs), Av 3 °F (36 8 °C), Min:98 °F (36 7 °C), Max:98 7 °F (37 1 °C)    Temp:  [98 °F (36 7 °C)-98 7 °F (37 1 °C)] 98 °F (36 7 °C)  HR:  [79-83] 81  Resp:  [16-18] 16  BP: (116-120)/(62-64) 119/62  SpO2:  [98 %] 98 %  Body mass index is 25 85 kg/m²  Input and Output Summary (last 24 hours): Intake/Output Summary (Last 24 hours) at 2019 1306  Last data filed at 2019 1300  Gross per 24 hour   Intake 760 ml   Output 1150 ml   Net -390 ml       Physical Exam:     Physical Exam   Constitutional: He is oriented to person, place, and time  No distress  HENT:   Head: Normocephalic and atraumatic  Eyes: No scleral icterus  Cardiovascular: Normal rate and regular rhythm  Murmur heard  Pulmonary/Chest: Effort normal and breath sounds normal  He has no wheezes  Abdominal: Soft  Bowel sounds are normal  He exhibits no distension  There is no tenderness  Musculoskeletal:   Right foot dressing C/D/I  Neurological: He is alert and oriented to person, place, and time  Skin: He is not diaphoretic  Vitals reviewed           Additional Data:     Labs:    Results from last 7 days   Lab Units 19  0853   WBC Thousand/uL 13 21*   HEMOGLOBIN g/dL 14 0   HEMATOCRIT % 42 4   PLATELETS Thousands/uL 444*   NEUTROS PCT % 62   LYMPHS PCT % 16   MONOS PCT % 8   EOS PCT % 11*     Results from last 7 days   Lab Units 19  0853  19  0644   POTASSIUM mmol/L 4 2   < > 3 8   CHLORIDE mmol/L 95*   < > 103   CO2 mmol/L 26   < > 25   BUN mg/dL 12   < > 13   CREATININE mg/dL 1 09   < > 1 06   CALCIUM mg/dL 8 6   < > 8 1*   ALK PHOS U/L  --   --  52   ALT U/L  --   --  8*   AST U/L  --   --  11    < > = values in this interval not displayed  * I Have Reviewed All Lab Data Listed Above  * Additional Pertinent Lab Tests Reviewed: All Labs Within Last 24 Hours Reviewed    Imaging:    Imaging Reports Reviewed Today Include: No new imaging      Recent Cultures (last 7 days):     Results from last 7 days   Lab Units 08/23/19  0806 08/19/19  1422   GRAM STAIN RESULT  No Polys  No organisms seen Rare Polys*  3+ Gram positive cocci in pairs*  Rare Gram negative rods*  Rare Gram positive rods*   WOUND CULTURE   --  4+ Growth of Methicillin Resistant Staphylococcus aureus*  2+ Growth of Proteus vulgaris group*  3 colonies Klebsiella oxytoca*       Last 24 Hours Medication List:     Current Facility-Administered Medications:  acetaminophen 650 mg Oral Q6H PRN Naz Orantes PA-C   amoxicillin-clavulanate 1 tablet Oral Q12H [address] Alisha Allan MD   aspirin 81 mg Oral Daily Trupti Rain PA-C   docusate sodium 100 mg Oral BID PRN Naz Orantes PA-C   doxycycline hyclate 100 mg Oral Q12H [address] Alisha Allan MD   furosemide 20 mg Oral Daily Trupti Rain PA-C   heparin (porcine) 5,000 Units Subcutaneous Q8H [address] Trupti Rain PA-C   hydrALAZINE 10 mg Intravenous Q4H PRN Naz Orantes PA-C   HYDROcodone-acetaminophen 1 tablet Oral Q4H PRN Naz Orantes PA-C   HYDROmorphone 0 2 mg Intravenous Q4H PRN Trupti Rain PA-C   insulin lispro 1-6 Units Subcutaneous 4x Daily (AC & HS) Trupti Rain PA-C   insulin NPH 30 Units Subcutaneous BID AC Tali Leary PA-C   ondansetron 4 mg Intravenous Q4H PRN Trupti Rain PA-C   sodium chloride (PF) 3 mL Intravenous PRN Naz Orantes PA-C   white petrolatum-mineral oil  Topical BID Naz Monas, PA-C        Today, Patient Was Seen By: Clint Glalagher PA-C    ** Please Note: Dictation voice to text software may have been used in the creation of this document   **

## 2019-08-26 NOTE — ASSESSMENT & PLAN NOTE
Hyponatremia chronic per review  TSH is normal   Sodium continuing to trend back down this admission  Nephrology input appreciated: ?related to poor solute intake as well as underlying CHF/retention of free water  Follow up serum osmolality, urine osmolality, urine sodium, and bladder scans  Na today 130 (132 when corrected for hyperglycemia)    Results from last 7 days   Lab Units 08/26/19  0853 08/25/19  0558 08/24/19  0441 08/22/19  0541 08/21/19  0443 08/20/19  0644 08/19/19  1259   SODIUM mmol/L 130* 131* 132* 135* 134* 135* 129*

## 2019-08-26 NOTE — CONSULTS
NEPHROLOGY CONSULTATION NOTE    Patient: Nichole Landa               Sex: male          DOA: 8/19/2019 11:45 AM   YOB: 1928        Age:  80 y o         LOS:  LOS: 7 days     REFERRING PHYSICIAN: Dr Theresa Diaz / CONSULTATION:  Hyponatremia    DATE OF CONSULTATION / SERVICE: 8/26/2019    ADMISSION DIAGNOSIS: Diabetic foot ulcer ([facility] )     CHIEF COMPLAINT     Diabetic foot ulcer    HPI     This is a 54-year-old male with past medical history of chronic hyponatremia, diabetes mellitus type 2, CHF with diastolic dysfunction who presented to our facility on August 19 after being sent by his podiatrist due to possible osteomyelitis of right foot 5th metatarsal head  Patient underwent amputation of the 5th metatarsal head on August 23rd along with a angiogram with angioplasty on August 21st   Renal consult called secondary to worsening of hyponatremia  After reviewing the medical records, patient appears to have chronically low sodium levels intensive however between 130-135  Current sodium was noted to be 130 and hence nephrology consult called  Patient is lying in bed and has no complaints  Admits to having low oral intake while hospitalized  Currently patient denies nausea, vomiting, headache, dizziness, abdominal pain, constipation or rash      PAST MEDICAL HISTORY     Past Medical History:   Diagnosis Date    BPH (benign prostatic hyperplasia)     CAD (coronary artery disease)     Congestive heart failure (CHF) ([place] [address] )     RESOLVED: [id number]    Dementia     Diabetes mellitus ([place] [address] )     History of colonoscopy     Hyperlipidemia     SSS (sick sinus syndrome) ([place] [address] )        PAST SURGICAL HISTORY     Past Surgical History:   Procedure Laterality Date    COLONOSCOPY      RESOLVED: [id number]    CORONARY ARTERY BYPASS GRAFT      FOUR-VESSEL BYPASS GRAFT INCLUDING INTERNAL MAMMARY TO LAD; RESOLVED: SEP 2010    IR ABDOMINAL ANGIOGRAPHY  8/21/2019    WOUND DEBRIDEMENT Right 2019    Procedure: DEBRIDEMENT LOWER EXTREMITY [facility] OUT); Surgeon:  Leonard Swan DPM;  Location: [facility];  Service: Podiatry       ALLERGIES     No Known Allergies    SOCIAL HISTORY     Social History     Substance and Sexual Activity   Alcohol Use Yes    Comment: RARELY      Social History     Substance and Sexual Activity   Drug Use No     Social History     Tobacco Use   Smoking Status Former Smoker    Packs/day: 1 00    Years: 25 00    Pack years: 25 00    Last attempt to quit: 1973    Years since quittin 6   Smokeless Tobacco Never Used       FAMILY HISTORY     Family History   Problem Relation Age of Onset    No Known Problems Mother        CURRENT MEDICATIONS       Current Facility-Administered Medications:     acetaminophen (TYLENOL) tablet 650 mg, 650 mg, Oral, Q6H PRN, Trupti Rain PA-C    amoxicillin-clavulanate (AUGMENTIN) 875-125 mg per tablet 1 tablet, 1 tablet, Oral, Q12H [facility], Liliana Molina MD, 1 tablet at 19 0830    aspirin (ECOTRIN LOW STRENGTH) EC tablet 81 mg, 81 mg, Oral, Daily, Trupti Rain PA-C, 81 mg at 19 0830    docusate sodium (COLACE) capsule 100 mg, 100 mg, Oral, BID PRN, Trupti Rain PA-C    doxycycline hyclate (VIBRAMYCIN) capsule 100 mg, 100 mg, Oral, Q12H [facility], Liliana Molina MD, 100 mg at 19 0830    furosemide (LASIX) tablet 20 mg, 20 mg, Oral, Daily, Trupti Rain PA-C, 20 mg at 19 0830    heparin (porcine) subcutaneous injection 5,000 Units, 5,000 Units, Subcutaneous, Q8H [facility], Acosta Sepulveda PA-C, 5,000 Units at 19 1166    hydrALAZINE (APRESOLINE) injection 10 mg, 10 mg, Intravenous, Q4H PRN, Trupti Rain PA-C    HYDROcodone-acetaminophen (NORCO) 5-325 mg per tablet 1 tablet, 1 tablet, Oral, Q4H PRN, Acosta Sepulveda PA-C, 1 tablet at 19 1144    HYDROmorphone (DILAUDID) injection 0 2 mg, 0 2 mg, Intravenous, Q4H PRN, Trupti Rain PA-C    insulin lispro (HumaLOG) 100 units/mL subcutaneous injection 1-6 Units, 1-6 Units, Subcutaneous, 4x Daily (AC & HS), 2 Units at 08/25/19 2237 **AND** Fingerstick Glucose (POCT), , , 4x Daily AC and at bedtime, Trupti Rain PA-C    insulin NPH (HumuLIN N,NovoLIN N) 100 Units/mL subcutaneous injection 30 Units, 30 Units, Subcutaneous, BID AC, Tali Leary PA-C, 30 Units at 08/26/19 0829    ondansetron [facility]) injection 4 mg, 4 mg, Intravenous, Q4H PRN, Trupti Rain PA-C, 4 mg at 08/19/19 2139    sodium chloride (PF) 0 9 % injection 3 mL, 3 mL, Intravenous, PRN, Trupti Rain PA-C    white petrolatum-mineral oil (EUCERIN,HYDROCERIN) cream, , Topical, BID, Evita Gomes PA-C    REVIEW OF SYSTEMS     Review of Systems   Constitutional: Positive for fatigue  HENT: Negative  Eyes: Negative  Respiratory: Negative  Cardiovascular: Negative  Gastrointestinal: Negative  Endocrine: Negative  Genitourinary: Negative  Musculoskeletal: Negative  Skin: Negative  Allergic/Immunologic: Negative  Neurological: Negative  Hematological: Negative  All other systems reviewed and are negative  OBJECTIVE     Current Weight: Weight - Scale: 77 1 kg (170 lb)  Vitals:    08/26/19 0708   BP: 119/62   Pulse: 81   Resp: 16   Temp: 98 °F (36 7 °C)   SpO2: 98%     Body mass index is 25 85 kg/m²  Intake/Output Summary (Last 24 hours) at 8/26/2019 1110  Last data filed at 8/26/2019 0000  Gross per 24 hour   Intake 280 ml   Output 650 ml   Net -370 ml       PHYSICAL EXAMINATION     Physical Exam   Constitutional: He is oriented to person, place, and time  He appears well-developed and well-nourished  HENT:   Head: Normocephalic and atraumatic  Eyes: Pupils are equal, round, and reactive to light  Neck: Neck supple  Cardiovascular: Normal rate and regular rhythm  Murmur heard  Pulmonary/Chest: Effort normal    Abdominal: Soft  Bowel sounds are normal    Musculoskeletal: Normal range of motion     Right foot dressing intact Neurological: He is alert and oriented to person, place, and time  Skin: Skin is warm  Psychiatric: He has a normal mood and affect  LAB RESULTS        Results from last 7 days   Lab Units 08/26/19  0853 08/25/19  0558 08/24/19  0441 08/23/19  0542 08/22/19  0541 08/21/19  0443 08/20/19  0644 08/19/19  1259   WBC Thousand/uL 13 21* 12 65* 12 82* 9 43 10 10 10 93* 12 54* 13 35*   HEMOGLOBIN g/dL 14 0 13 2 12 1 12 2 12 1 12 2 12 2 14 2   HEMATOCRIT % 42 4 40 8 36 1* 37 2 37 0 37 3 36 2* 43 3   PLATELETS Thousands/uL 444* 328 389 391* 394* 377 359 396*   POTASSIUM mmol/L 4 2 3 9 4 3  --  4 2 4 4 3 8 5 3   CHLORIDE mmol/L 95* 99* 99*  --  101 99* 103 92*   CO2 mmol/L 26 24 25  --  26 27 25 27   BUN mg/dL 12 10 12  --  13 12 13 18   CREATININE mg/dL 1 09 0 90 1 04  --  1 09 1 14 1 06 1 36*   EGFR ml/min/1 73sq m 59 75 63  --  59 56 61 45   CALCIUM mg/dL 8 6 8 5 8 3  --  8 3 8 4 8 1* 9 2   MAGNESIUM mg/dL  --   --   --   --   --   --   --  1 9       I have personally reviewed the old medical records and patient's previously known baseline creatinine level is ~ 1 0  RADIOLOGY RESULTS     No results found for this or any previous visit  Results for orders placed during the hospital encounter of 08/19/19   XR chest 2 views    Narrative CHEST     INDICATION:   chest pain  COMPARISON:  September 7, 2010  EXAM PERFORMED/VIEWS:  XR CHEST PA & LATERAL      FINDINGS:  Left-sided chest wall pacemaker is identified  Pacemaker leads are intact  Cardiomediastinal silhouette appears unremarkable  Patient is status post median sternotomy  Surgical clips are noted in the mediastinum  The lungs are clear  No pneumothorax or pleural effusion  Osseous structures appear within normal limits for patient age  Impression No acute cardiopulmonary disease  Workstation performed: [id number]       No results found for this or any previous visit  No results found for this or any previous visit    No results found for this or any previous visit  No results found for this or any previous visit  PLAN / RECOMMENDATIONS      60-year-old male with past medical history of diabetes mellitus type 2, peripheral vascular disease, CHF with diastolic dysfunction, chronic hyponatremia who presented to our facility with possible osteomyelitis of the right foot 5th metatarsal head  1  Chronic hyponatremia:  Etiology unknown at this time while labs pending however could be related to poor solute intake made worse by underlying CHF with diastolic dysfunction resulting retention of free water with possible component of urinary retention as patient had been noted on oxybutynin for urge incontinence   -his corrected serum sodium after correction for hyperglycemia is approximately 132 mEq per L    -will order urine osmolality, serum osmolality, serum uric acid, urine sodium, urine potassium and urine chloride   -will order urinary retention protocol to ensure prevention of urinary retention as a possible component of hyponatremia  2  Osteomyelitis:  Status post right foot metatarsal head amputation  Currently on oral antibiotics  3  Chronic kidney disease stage 2 to 3:  Patient's estimated GFR had ranged between 50 -70 during this admission  Recent quantification of proteinuria had been within normal limits  4  CHF with diastolic dysfunction:  Appears compensated at this time while on Lasix 20 mg once daily  Thank you for the consultation to participate in patient's care  I have personally discussed my plan with the referring physician       Shelly Sandifer, MD    8/26/2019

## 2019-08-26 NOTE — PROGRESS NOTES
Progress Note - Infectious Disease   Momo Larios 80 y o  male MRN: [id number]  Unit/Bed#: -01 Encounter: [id number]      Impression/Plan:  1  Diabetic foot ulcer with cellulitis and gangrene   Patient presents at this time with progression of the previously diagnosed diabetic foot ulcer with cellulitis and gangrene   Imaging concerning for osteomyelitis   Patient is fortunately hemodynamically stable   No other prior culture data in our system   Wound cultures noted to be polymicrobial   Patient is now post debridement the OR with removal of all nonviable tissue/source control  OR cultures reviewed  Patient has been on oral antibiotic postprocedure and is developing elevated eosinophils peripheral, concern for developing hypersensitivity  Clinically is otherwise stable  Will complete Augmentin and doxycycline today  Last doses of antibiotics ordered  Would review patient's other medications given elevated eosinophils  Continue to trend fever curve/vitals  Repeat CBC/chemistry tomorrow  Serial foot exams  Ongoing follow-up by Podiatry  Additional care as per primary     2  Fifth metatarsal osteomyelitis   Patient noted with progressive diabetic foot ulcer and now osteomyelitis on imaging   Patient has failed outpatient antibiotic therapy possibly with amoxicillin   Wound cultures polymicrobial increasing patient's risk of relapse  Patient now status post amputation  Antibiotics as above  Continue to trend fever curve/vitals  Serial exams  Podiatry evaluation appreciated  Additional care as per primary     3  Leukocytosis and peripheral eosinophilia   Patient with elevated white blood cell count on admission which is likely due to the above   No other obvious sources on exam   White blood cell count elevated today and may be due to increasing eosinophil count  Repeat CBC tomorrow  Complete antibiotics today  Additional care as above     4   Chronic kidney disease   Patient with underlying chronic kidney disease   Creatinine appears to be close to baseline  Repeat chemistry tomorrow  Ongoing follow-up by Nephrology  Additional care as per primary     5  Poorly controlled diabetes   Patient noted with hemoglobin A1c of 7  Unfortunately this is risk factor for progressing wounds  Glucose management as per primary   Antibiotics as above      6  Sick sinus syndrome with pacemaker   No acute issues noted at pacemaker site  [facility] cultures fortunately without growth      7  Reported dementia   Patient seems to have issues related to time  [facility] may have underlying dementia   Currently is fully independent and lives on his own  Placement/service issues as per primary service  Ongoing follow-up by case management  Consider rehab given poor compliance as outpatient     Above plan discussed in detail with the patient  ID consult service will continue to follow  Antibiotics:  Augmentin/doxycycline    24 hour events:  Patient is currently afebrile  He is noted to have elevated white count--significantly elevated eosinophilia  Creatinine this currently stable  Patient's other vitals are stable  No additional imaging overnight    Subjective:  Patient currently denies having any nausea, vomiting, chest pain or shortness of breath  He denies having any significant rash with antibiotics  He is currently tolerating without acute issue  Objective:  Vitals:  Temp:  [98 °F (36 7 °C)-98 7 °F (37 1 °C)] 98 °F (36 7 °C)  HR:  [79-83] 81  Resp:  [16-18] 16  BP: (116-120)/(62-64) 119/62  SpO2:  [98 %] 98 %  Temp (24hrs), Av 3 °F (36 8 °C), Min:98 °F (36 7 °C), Max:98 7 °F (37 1 °C)  Current: Temperature: 98 °F (36 7 °C)    Physical Exam:   General Appearance:  Alert, interactive, nontoxic, no acute distress  Throat: Oropharynx moist without lesions      Lungs:   Clear to auscultation bilaterally; no wheezes, rhonchi or rales; respirations unlabored   Heart:  RRR; no murmur, rub or gallop   Abdomen:   Soft, non-tender, non-distended, positive bowel sounds  Extremities: No clubbing, cyanosis or edema   Skin: No new rashes or lesions  No New draining wounds noted  Surgical site is currently wrapped at this time  Labs, Imaging, & Other studies:   All pertinent labs and imaging studies were personally reviewed  Results from last 7 days   Lab Units 08/26/19  0853 08/25/19  0558 08/24/19  0441   WBC Thousand/uL 13 21* 12 65* 12 82*   HEMOGLOBIN g/dL 14 0 13 2 12 1   PLATELETS Thousands/uL 444* 328 389     Results from last 7 days   Lab Units 08/26/19  0853  08/20/19  0644 08/19/19  1259   POTASSIUM mmol/L 4 2   < > 3 8 5 3   CHLORIDE mmol/L 95*   < > 103 92*   CO2 mmol/L 26   < > 25 27   BUN mg/dL 12   < > 13 18   CREATININE mg/dL 1 09   < > 1 06 1 36*   EGFR ml/min/1 73sq m 59   < > 61 45   CALCIUM mg/dL 8 6   < > 8 1* 9 2   AST U/L  --   --  11 12   ALT U/L  --   --  8* 12   ALK PHOS U/L  --   --  52 64    < > = values in this interval not displayed  Results from last 7 days   Lab Units 08/23/19  0806 08/19/19  1422 08/19/19  1300   BLOOD CULTURE   --   --  No Growth After 5 Days     GRAM STAIN RESULT  No Polys  No organisms seen Rare Polys*  3+ Gram positive cocci in pairs*  Rare Gram negative rods*  Rare Gram positive rods*  --    WOUND CULTURE   --  4+ Growth of Methicillin Resistant Staphylococcus aureus*  2+ Growth of Proteus vulgaris group*  3 colonies Klebsiella oxytoca*  --

## 2019-08-26 NOTE — SOCIAL WORK
ANTHONY received a call from Henny Harris at [facility] stating that they can offer a bed for STR  She stated that she called Manny Santiago to discuss their bed offer  CM contacted Manny Santiago, pt's son via telephone at [phone] to discuss dcp  He stated that he spoke with Henny Harris and liked what he heard about the facility  He said that he is okay with pt going to this facility  Per slim, wbc elevated so ID consulted and sodium down so nephrology consulted  Pt will discharge to [facility] once medically stable

## 2019-08-26 NOTE — ASSESSMENT & PLAN NOTE
· CKD 3 likely secondary to diabetes mellitus  Appears to be at baseline       Results from last 7 days   Lab Units 08/26/19  0853 08/25/19  0558 08/24/19  0441 08/22/19  0541 08/21/19  0443 08/20/19  0644   BUN mg/dL 12 10 12 13 12 13   CREATININE mg/dL 1 09 0 90 1 04 1 09 1 14 1 06   EGFR ml/min/1 73sq m 59 75 63 59 56 61

## 2019-08-27 VITALS
HEART RATE: 75 BPM | HEIGHT: 68 IN | DIASTOLIC BLOOD PRESSURE: 59 MMHG | RESPIRATION RATE: 18 BRPM | SYSTOLIC BLOOD PRESSURE: 112 MMHG | WEIGHT: 170 LBS | OXYGEN SATURATION: 98 % | TEMPERATURE: 98 F | BODY MASS INDEX: 25.76 KG/M2

## 2019-08-27 LAB
ANION GAP SERPL CALCULATED.3IONS-SCNC: 8 MMOL/L (ref 4–13)
BASOPHILS # BLD AUTO: 0.06 THOUSANDS/ΜL (ref 0–0.1)
BASOPHILS NFR BLD AUTO: 1 % (ref 0–1)
BUN SERPL-MCNC: 13 MG/DL (ref 5–25)
CALCIUM SERPL-MCNC: 8.7 MG/DL (ref 8.3–10.1)
CHLORIDE SERPL-SCNC: 98 MMOL/L (ref 100–108)
CO2 SERPL-SCNC: 26 MMOL/L (ref 21–32)
CREAT SERPL-MCNC: 0.97 MG/DL (ref 0.6–1.3)
EOSINOPHIL # BLD AUTO: 1.61 THOUSAND/ΜL (ref 0–0.61)
EOSINOPHIL NFR BLD AUTO: 13 % (ref 0–6)
ERYTHROCYTE [DISTWIDTH] IN BLOOD BY AUTOMATED COUNT: 13.7 % (ref 11.6–15.1)
GFR SERPL CREATININE-BSD FRML MDRD: 68 ML/MIN/1.73SQ M
GLUCOSE SERPL-MCNC: 111 MG/DL (ref 65–140)
GLUCOSE SERPL-MCNC: 233 MG/DL (ref 65–140)
HCT VFR BLD AUTO: 40.7 % (ref 36.5–49.3)
HGB BLD-MCNC: 13.3 G/DL (ref 12–17)
IMM GRANULOCYTES # BLD AUTO: 0.25 THOUSAND/UL (ref 0–0.2)
IMM GRANULOCYTES NFR BLD AUTO: 2 % (ref 0–2)
LYMPHOCYTES # BLD AUTO: 2.08 THOUSANDS/ΜL (ref 0.6–4.47)
LYMPHOCYTES NFR BLD AUTO: 16 % (ref 14–44)
MCH RBC QN AUTO: 30.3 PG (ref 26.8–34.3)
MCHC RBC AUTO-ENTMCNC: 32.7 G/DL (ref 31.4–37.4)
MCV RBC AUTO: 93 FL (ref 82–98)
MONOCYTES # BLD AUTO: 1.09 THOUSAND/ΜL (ref 0.17–1.22)
MONOCYTES NFR BLD AUTO: 8 % (ref 4–12)
NEUTROPHILS # BLD AUTO: 7.81 THOUSANDS/ΜL (ref 1.85–7.62)
NEUTS SEG NFR BLD AUTO: 60 % (ref 43–75)
NRBC BLD AUTO-RTO: 0 /100 WBCS
PLATELET # BLD AUTO: 434 THOUSANDS/UL (ref 149–390)
PMV BLD AUTO: 9.4 FL (ref 8.9–12.7)
POTASSIUM SERPL-SCNC: 3.9 MMOL/L (ref 3.5–5.3)
RBC # BLD AUTO: 4.39 MILLION/UL (ref 3.88–5.62)
SODIUM SERPL-SCNC: 132 MMOL/L (ref 136–145)
WBC # BLD AUTO: 12.9 THOUSAND/UL (ref 4.31–10.16)

## 2019-08-27 PROCEDURE — 85025 COMPLETE CBC W/AUTO DIFF WBC: CPT | Performed by: INTERNAL MEDICINE

## 2019-08-27 PROCEDURE — 82948 REAGENT STRIP/BLOOD GLUCOSE: CPT

## 2019-08-27 PROCEDURE — 99238 HOSP IP/OBS DSCHRG MGMT 30/<: CPT | Performed by: PHYSICIAN ASSISTANT

## 2019-08-27 PROCEDURE — 99232 SBSQ HOSP IP/OBS MODERATE 35: CPT | Performed by: INTERNAL MEDICINE

## 2019-08-27 PROCEDURE — 80048 BASIC METABOLIC PNL TOTAL CA: CPT | Performed by: INTERNAL MEDICINE

## 2019-08-27 RX ORDER — DOCUSATE SODIUM 100 MG/1
100 CAPSULE, LIQUID FILLED ORAL 2 TIMES DAILY PRN
Qty: 10 CAPSULE | Refills: 0
Start: 2019-08-27

## 2019-08-27 RX ORDER — HYDROCODONE BITARTRATE AND ACETAMINOPHEN 5; 325 MG/1; MG/1
1 TABLET ORAL EVERY 4 HOURS PRN
Qty: 4 TABLET | Refills: 0 | Status: SHIPPED | OUTPATIENT
Start: 2019-08-27

## 2019-08-27 RX ORDER — LANOLIN ALCOHOL/MO/W.PET/CERES
CREAM (GRAM) TOPICAL 2 TIMES DAILY
Refills: 0
Start: 2019-08-27

## 2019-08-27 RX ORDER — OXYBUTYNIN CHLORIDE 5 MG/1
TABLET ORAL
Qty: 60 TABLET | Refills: 0 | OUTPATIENT
Start: 2019-08-27

## 2019-08-27 RX ADMIN — FUROSEMIDE 20 MG: 20 TABLET ORAL at 08:29

## 2019-08-27 RX ADMIN — INSULIN LISPRO 3 UNITS: 100 INJECTION, SOLUTION INTRAVENOUS; SUBCUTANEOUS at 13:14

## 2019-08-27 RX ADMIN — ASPIRIN 81 MG: 81 TABLET, COATED ORAL at 08:29

## 2019-08-27 RX ADMIN — HEPARIN SODIUM 5000 UNITS: 5000 INJECTION INTRAVENOUS; SUBCUTANEOUS at 13:14

## 2019-08-27 RX ADMIN — INSULIN HUMAN 35 UNITS: 100 INJECTION, SUSPENSION SUBCUTANEOUS at 08:30

## 2019-08-27 RX ADMIN — Medication: at 08:30

## 2019-08-27 RX ADMIN — HEPARIN SODIUM 5000 UNITS: 5000 INJECTION INTRAVENOUS; SUBCUTANEOUS at 05:40

## 2019-08-27 NOTE — PLAN OF CARE
Problem: Nutrition/Hydration-ADULT  Goal: Nutrient/Hydration intake appropriate for improving, restoring or maintaining nutritional needs  Description  Monitor and assess patient's nutrition/hydration status for malnutrition  Collaborate with interdisciplinary team and initiate plan and interventions as ordered  Monitor patient's weight and dietary intake as ordered or per policy  Utilize nutrition screening tool and intervene as necessary  Determine patient's food preferences and provide high-protein, high-caloric foods as appropriate       INTERVENTIONS:  - Monitor oral intake, urinary output, labs, and treatment plans  - Assess nutrition and hydration status and recommend course of action  - Evaluate amount of meals eaten  - Assist patient with eating if necessary   - Allow adequate time for meals  - Recommend/ encourage appropriate diets, oral nutritional supplements, and vitamin/mineral supplements  - Order, calculate, and assess calorie counts as needed  - Recommend, monitor, and adjust tube feedings and TPN/PPN based on assessed needs  - Assess need for intravenous fluids  - Provide specific nutrition/hydration education as appropriate  - Include patient/family/caregiver in decisions related to nutrition  Outcome: Progressing     Problem: PAIN - ADULT  Goal: Verbalizes/displays adequate comfort level or baseline comfort level  Description  Interventions:  - Encourage patient to monitor pain and request assistance  - Assess pain using appropriate pain scale  - Administer analgesics based on type and severity of pain and evaluate response  - Implement non-pharmacological measures as appropriate and evaluate response  - Consider cultural and social influences on pain and pain management  - Notify physician/advanced practitioner if interventions unsuccessful or patient reports new pain  Outcome: Progressing     Problem: INFECTION - ADULT  Goal: Absence or prevention of progression during hospitalization  Description  INTERVENTIONS:  - Assess and monitor for signs and symptoms of infection  - Monitor lab/diagnostic results  - Monitor all insertion sites, i e  indwelling lines, tubes, and drains  - Monitor endotracheal if appropriate and nasal secretions for changes in amount and color  - [place] appropriate cooling/warming therapies per order  - Administer medications as ordered  - Instruct and encourage patient and family to use good hand hygiene technique  - Identify and instruct in appropriate isolation precautions for identified infection/condition  Outcome: Progressing     Problem: SAFETY ADULT  Goal: Patient will remain free of falls  Description  INTERVENTIONS:  - Assess patient frequently for physical needs  -  Identify cognitive and physical deficits and behaviors that affect risk of falls    -  [place] fall precautions as indicated by assessment   - Educate patient/family on patient safety including physical limitations  - Instruct patient to call for assistance with activity based on assessment  - Modify environment to reduce risk of injury  - Consider OT/PT consult to assist with strengthening/mobility  Outcome: Progressing  Goal: Maintain or return to baseline ADL function  Description  INTERVENTIONS:  -  Assess patient's ability to carry out ADLs; assess patient's baseline for ADL function and identify physical deficits which impact ability to perform ADLs (bathing, care of mouth/teeth, toileting, grooming, dressing, etc )  - Assess/evaluate cause of self-care deficits   - Assess range of motion  - Assess patient's mobility; develop plan if impaired  - Assess patient's need for assistive devices and provide as appropriate  - Encourage maximum independence but intervene and supervise when necessary  - Involve family in performance of ADLs  - Assess for home care needs following discharge   - Consider OT consult to assist with ADL evaluation and planning for discharge  - Provide patient education as appropriate  Outcome: Progressing  Goal: Maintain or return mobility status to optimal level  Description  INTERVENTIONS:  - Assess patient's baseline mobility status (ambulation, transfers, stairs, etc )    - Identify cognitive and physical deficits and behaviors that affect mobility  - Identify mobility aids required to assist with transfers and/or ambulation (gait belt, sit-to-stand, lift, walker, cane, etc )  - [place] fall precautions as indicated by assessment  - Record patient progress and toleration of activity level on Mobility SBAR; progress patient to next Phase/Stage  - Instruct patient to call for assistance with activity based on assessment  - Consider rehabilitation consult to assist with strengthening/weightbearing, etc   Outcome: Progressing     Problem: DISCHARGE PLANNING  Goal: Discharge to home or other facility with appropriate resources  Description  INTERVENTIONS:  - Identify barriers to discharge w/patient and caregiver  - Arrange for needed discharge resources and transportation as appropriate  - Identify discharge learning needs (meds, wound care, etc )  - Arrange for interpretive services to assist at discharge as needed  - Refer to Case Management Department for coordinating discharge planning if the patient needs post-hospital services based on physician/advanced practitioner order or complex needs related to functional status, cognitive ability, or social support system  Outcome: Progressing     Problem: Knowledge Deficit  Goal: Patient/family/caregiver demonstrates understanding of disease process, treatment plan, medications, and discharge instructions  Description  Complete learning assessment and assess knowledge base    Interventions:  - Provide teaching at level of understanding  - Provide teaching via preferred learning methods  Outcome: Progressing     Problem: Potential for Falls  Goal: Patient will remain free of falls  Description  INTERVENTIONS:  - Assess patient frequently for physical needs  -  Identify cognitive and physical deficits and behaviors that affect risk of falls    -  [place] fall precautions as indicated by assessment   - Educate patient/family on patient safety including physical limitations  - Instruct patient to call for assistance with activity based on assessment  - Modify environment to reduce risk of injury  - Consider OT/PT consult to assist with strengthening/mobility  Outcome: Progressing     Problem: Prexisting or High Potential for Compromised Skin Integrity  Goal: Skin integrity is maintained or improved  Description  INTERVENTIONS:  - Identify patients at risk for skin breakdown  - Assess and monitor skin integrity  - Assess and monitor nutrition and hydration status  - Monitor labs   - Assess for incontinence   - Turn and reposition patient  - Assist with mobility/ambulation  - Relieve pressure over bony prominences  - Avoid friction and shearing  - Provide appropriate hygiene as needed including keeping skin clean and dry  - Evaluate need for skin moisturizer/barrier cream  - Collaborate with interdisciplinary team   - Patient/family teaching  - Consider wound care consult   Outcome: Progressing

## 2019-08-27 NOTE — SOCIAL WORK
ANTHONY contacted Sony Morfin via telephone to inform him that pt is ready for discharge today to [address]  He stated that pt's daughter in law, Ying Llanos will pick pt up at 2pm  CM notified slim and RN of transport time  CM met with pt to review IMM  CM provided pt with copy of IMM  IMM placed in medical records

## 2019-08-27 NOTE — ASSESSMENT & PLAN NOTE
· CKD 3 likely secondary to diabetes mellitus  Appears to be at baseline       Results from last 7 days   Lab Units 08/27/19  0436 08/26/19  0853 08/25/19  0558 08/24/19  0441 08/22/19  0541 08/21/19  0443   BUN mg/dL 13 12 10 12 13 12   CREATININE mg/dL 0 97 1 09 0 90 1 04 1 09 1 14   EGFR ml/min/1 73sq m 68 59 75 63 59 56

## 2019-08-27 NOTE — DISCHARGE SUMMARY
Discharge- Chanda Olena 11/11/1928, 80 y o  male MRN: [id number]    Unit/Bed#: -01 Encounter: [id number]    Primary Care Provider: Shruti Downs DPM   Date and time admitted to hospital: 8/19/2019 11:45 AM    * Diabetic foot ulcer ([address] )  Assessment & Plan  · Diabetic foot ulcer with celluitis and gangrene  CT images suggesting osteomyelitis without abscess  Wound culture (+) MRSA  · Patient is s/p OR 8/23 for right foot metatarsal head resection with debridement  OR wound cultures show MRSA and Proteus Vulgaris  · ID input appreciated: Vanco and Flagyl IV transitioned to Augmentin and Doxycycline po through 8/28, completed post-op course, discontinued due to eosinophilia  · Vascular input appreciated: s/p angio 8/21  · Podiatry input appreciated: s/p OR 8/23, serial foot exams  · Remains hemodynamically stable for discharge to [address] [place] [address]  CAD (coronary artery disease)  Assessment & Plan  · Coronary artery disease with history of CABG  No active chest pain  Continue aspirin, statin therapy recommended based on risk factors, however given age likely of little benefit  · Last cholesterol 05/2019 showing total 170, LDL 93, HDL 48,   CKD stage 3 secondary to diabetes [facility])  Assessment & Plan  · CKD 3 likely secondary to diabetes mellitus  Appears to be at baseline       Results from last 7 days   Lab Units 08/27/19  0436 08/26/19  0853 08/25/19  0558 08/24/19  0441 08/22/19  0541 08/21/19  0443   BUN mg/dL 13 12 10 12 13 12   CREATININE mg/dL 0 97 1 09 0 90 1 04 1 09 1 14   EGFR ml/min/1 73sq m 68 59 75 63 59 56       Type 2 diabetes mellitus with diabetic chronic kidney disease [facility])  Assessment & Plan  Lab Results   Component Value Date    HGBA1C 7 0 (H) 05/10/2019       Recent Labs     08/26/19  1153 08/26/19  1549 08/26/19  2113 08/27/19  1143   POCGLU 252* 260* 188* 233*       · Type 2 diabetes mellitus on Novolin 70/30, resume 25 units BID on discharge, resume metformin on discharge  · Continue consistent carbohydrate diet  Congestive heart failure (CHF) ([facility])  Assessment & Plan  Wt Readings from Last 3 Encounters:   08/20/19 77 1 kg (170 lb)   05/28/19 80 7 kg (178 lb)   05/08/19 72 6 kg (160 lb)     · Chronic diastolic CHF on Furosemide, without exacerbation this admission  Monitor I&Os, daily weights  Hyponatremia  Assessment & Plan  · Hyponatremia, chronic per review  TSH is normal   Appears to be hypotonic euvolemic based on nephrology evaluation  · Recommend high protein diet, with monitoring fluid intake, will place on restriction 2000 mL on discharge  Results from last 7 days   Lab Units 08/27/19  0436 08/26/19  0853 08/25/19  0558 08/24/19  0441 08/22/19  0541 08/21/19  0443   SODIUM mmol/L 132* 130* 131* 132* 135* 134*       Dementia  Assessment & Plan  · Dementia, reportedly previously on Donepezil  Monitor for any signs of sundowning  Stable throughout hospitalization  SSS (sick sinus syndrome) ([facility])  Assessment & Plan  · Sick sinus syndrome status post pacemaker  Hyperlipidemia  Assessment & Plan  · Hyperlipidemia, previously on pravastatin  Statin likely of little benefit for this advanced age gentleman  BPH (benign prostatic hyperplasia)  Assessment & Plan  · Reported history of BPH without active symptoms  Hypertension, essential  Assessment & Plan  · BP remains controlled            Discharging Physician / Practitioner: Nir Betts PA-C  PCP: Lindsey Bryant DPM  Admission Date:   Admission Orders (From admission, onward)     Ordered        08/19/19 1407  Inpatient Admission  Once                   Discharge Date: 08/27/19    Resolved Problems  Date Reviewed: 8/27/2019    None          Consultations During Hospital Stay:  · Infectious disease  · Vascular surgery  · Podiatry   · Nephrology    Procedures Performed:   · CXR 8/19:  No acute cardiopulmonary disease  · CT right foot 8/19:  Ulcer in the lateral aspect of the right forefoot adjacent to the head of the right 5th metatarsal surrounding cellulitis, osteomyelitis the head of the right 5th metatarsal, adjacent cellulitis and no evidence of abscess or soft tissue gas  · Arterial duplex 8/19:  Diffuse lower extremity arterial occlusive disease of the right limb with chronically occluded anterior tibial artery  · CTA with runoff 8/20:  Bilateral lower extremity arterial disease  · IR angiography/angioplasty 8/21:  Successful recannulization of the distal posterior tibial artery balloon dilated to 2 mm  · OR debridement and resection right 5th metatarsal head 8/23    Significant Findings / Test Results:      Ref   Range 8/27/2019 04:36   Sodium Latest Ref Range: 136 - 145 mmol/L 132 (L)   Potassium Latest Ref Range: 3 5 - 5 3 mmol/L 3 9   Chloride Latest Ref Range: 100 - 108 mmol/L 98 (L)   CO2 Latest Ref Range: 21 - 32 mmol/L 26   Anion Gap Latest Ref Range: 4 - 13 mmol/L 8   BUN Latest Ref Range: 5 - 25 mg/dL 13   Creatinine Latest Ref Range: 0 60 - 1 30 mg/dL 0 97   Glucose, Random Latest Ref Range: 65 - 140 mg/dL 111   Calcium Latest Ref Range: 8 3 - 10 1 mg/dL 8 7   eGFR Latest Units: ml/min/1 73sq m 68   WBC Latest Ref Range: 4 31 - 10 16 Thousand/uL 12 90 (H)   Red Blood Cell Count Latest Ref Range: 3 88 - 5 62 Million/uL 4 39   Hemoglobin Latest Ref Range: 12 0 - 17 0 g/dL 13 3   HCT Latest Ref Range: 36 5 - 49 3 % 40 7   MCV Latest Ref Range: 82 - 98 fL 93   MCH Latest Ref Range: 26 8 - 34 3 pg 30 3   MCHC Latest Ref Range: 31 4 - 37 4 g/dL 32 7   RDW Latest Ref Range: 11 6 - 15 1 % 13 7   Platelet Count Latest Ref Range: 149 - 390 Thousands/uL 434 (H)   MPV Latest Ref Range: 8 9 - 12 7 fL 9 4   nRBC Latest Units: /100 WBCs 0   Neutrophils % Latest Ref Range: 43 - 75 % 60   Immat GRANS % Latest Ref Range: 0 - 2 % 2   Lymphocytes Relative Latest Ref Range: 14 - 44 % 16   Monocytes Relative Latest Ref Range: 4 - 12 % 8   Eosinophils Latest Ref Range: 0 - 6 % 13 (H)   Basophils Relative Latest Ref Range: 0 - 1 % 1   Immature Grans Absolute Latest Ref Range: 0 00 - 0 20 Thousand/uL 0 25 (H)   Absolute Neutrophils Latest Ref Range: 1 85 - 7 62 Thousands/µL 7 81 (H)   Lymphocytes Absolute Latest Ref Range: 0 60 - 4 47 Thousands/µL 2 08   Absolute Monocytes Latest Ref Range: 0 17 - 1 22 Thousand/µL 1 09   Absolute Eosinophils Latest Ref Range: 0 00 - 0 61 Thousand/µL 1 61 (H)   Basophils Absolute Latest Ref Range: 0 00 - 0 10 Thousands/µL 0 06     · Wound culture 4+ MRSA, 2+ Proteus vulgaris, 3 colonies Klebsiella oxytoca  · Anaerobic culture no growth  · Intraoperative culture 1+ Proteus vulgaris, 1+ MRSA    Incidental Findings:   ·      Test Results Pending at Discharge (will require follow up):   ·      Outpatient Tests Requested:  · Follow-up with Podiatry within 1 week  · Follow-up PCP    Complications:  None    Reason for Admission:  Cellulitis right foot, osteomyelitis    Hospital Course:     Judy Canavan is a 80 y o  male patient who originally presented to the hospital on 8/19/2019 due to several week with an open wound, nonhealing to the right foot  Refer to the ER by his podiatrist secondary to concern for osteomyelitis and worsening infection  Imaging on admission improves osteomyelitis, wound culture polymicrobial as above  Unfortunately patient did have significant peripheral arterial disease, required vascular intervention prior to surgery  Infectious Disease followed the patient along with Podiatry and vascular during hospitalization  Seen by PT and OT and recommended for rehab, patient lives alone  Patient is medically stable at this time for discharge to rehab, his family will transport him at this time  Past medical history includes type 2 diabetes CAD status post CABG, sick sinus syndrome status post pacemaker, hypertension, chronic hyponatremia, and dementia  These  all appear to be under generally good control        Please see above list of diagnoses and related plan for additional information  Condition at Discharge: good     Discharge Day Visit / Exam:     Subjective:  Patient seen this morning, feeling well  Denies any pain at the operative site  Wants to know about going to rehab  Denies any chest pain, palpitations, or shortness of breath  Reports moving his bowels and urinating well  Last bowel movement this morning  Vitals: Blood Pressure: 112/59 (08/27/19 0714)  Pulse: 75 (08/27/19 0714)  Temperature: 98 °F (36 7 °C) (08/27/19 0714)  Temp Source: Oral (08/23/19 0700)  Respirations: 18 (08/27/19 0714)  Height: 5' 8" (172 7 cm) (08/20/19 1124)  Weight - Scale: 77 1 kg (170 lb) (08/20/19 1124)  SpO2: 98 % (08/27/19 0714)  Exam:   Physical Exam   Constitutional: Vital signs are normal  He appears well-developed and well-nourished  No distress  Cardiovascular: Normal rate, regular rhythm, S1 normal, S2 normal and normal heart sounds  Pulmonary/Chest: Effort normal and breath sounds normal  No respiratory distress  He has no wheezes  He has no rhonchi  Musculoskeletal: He exhibits no edema  Neurological: He is alert  Skin:   Foot wrapped, dressing clean and intact   Psychiatric: He has a normal mood and affect  Nursing note and vitals reviewed  Discussion with Family:  None present    Discharge instructions/Information to patient and family:   See after visit summary for information provided to patient and family  Provisions for Follow-Up Care:  See after visit summary for information related to follow-up care and any pertinent home health orders  Disposition:     [facility]    Planned Readmission:  None     Discharge Statement:  I spent approximately 28 minutes discharging the patient  This time was spent on the day of discharge  I had direct contact with the patient on the day of discharge   Greater than 50% of the total time was spent examining patient, answering all patient questions, arranging and discussing plan of care with patient as well as directly providing post-discharge instructions  Additional time then spent on discharge activities  Discharge Medications:  See after visit summary for reconciled discharge medications provided to patient and family        ** Please Note: This note has been constructed using a voice recognition system **

## 2019-08-27 NOTE — ASSESSMENT & PLAN NOTE
· Hyponatremia, chronic per review  TSH is normal   Appears to be hypotonic euvolemic based on nephrology evaluation  · Recommend high protein diet, with monitoring fluid intake, will place on restriction 2000 mL on discharge      Results from last 7 days   Lab Units 08/27/19  0436 08/26/19  0853 08/25/19  0558 08/24/19  0441 08/22/19  0541 08/21/19  0443   SODIUM mmol/L 132* 130* 131* 132* 135* 134*

## 2019-08-27 NOTE — PROGRESS NOTES
NEPHROLOGY PROGRESS NOTE    Patient: Ron Hammer               Sex: male          DOA: 8/19/2019 11:45 AM   YOB: 1928        Age:  80 y o         LOS:  LOS: 8 days   8/27/2019    REASON FOR THE CONSULTATION:      Hyponatremia    SUBJECTIVE     Patient is seen and examined next to the bedside  Feels well       CURRENT MEDICATIONS       Current Facility-Administered Medications:     acetaminophen (TYLENOL) tablet 650 mg, 650 mg, Oral, Q6H PRN, Trupti Rain PA-C    aspirin (ECOTRIN LOW STRENGTH) EC tablet 81 mg, 81 mg, Oral, Daily, KEVIN Contreras-C, 81 mg at 08/27/19 5505    docusate sodium (COLACE) capsule 100 mg, 100 mg, Oral, BID PRN, Trupti Rain PA-C    furosemide (LASIX) tablet 20 mg, 20 mg, Oral, Daily, KEVIN Contreras-LUCRECIA, 20 mg at 08/27/19 8168    heparin (porcine) subcutaneous injection 5,000 Units, 5,000 Units, Subcutaneous, Q8H [facility], Janett Sherman PA-C, 5,000 Units at 08/27/19 0540    hydrALAZINE (APRESOLINE) injection 10 mg, 10 mg, Intravenous, Q4H PRN, Trupti Rain PA-C    HYDROcodone-acetaminophen (NORCO) 5-325 mg per tablet 1 tablet, 1 tablet, Oral, Q4H PRN, Janett Sherman PA-C, 1 tablet at 08/23/19 1144    HYDROmorphone (DILAUDID) injection 0 2 mg, 0 2 mg, Intravenous, Q4H PRN, Trupti Rain PA-C    insulin lispro (HumaLOG) 100 units/mL subcutaneous injection 1-6 Units, 1-6 Units, Subcutaneous, 4x Daily (AC & HS), 1 Units at 08/26/19 2115 **AND** Fingerstick Glucose (POCT), , , 4x Daily AC and at bedtime, Trupti Rain PA-C    insulin NPH (HumuLIN N,NovoLIN N) 100 Units/mL subcutaneous injection 30 Units, 30 Units, Subcutaneous, HS, Trupti Rain PA-C    insulin NPH (HumuLIN N,NovoLIN N) 100 Units/mL subcutaneous injection 35 Units, 35 Units, Subcutaneous, Daily Before Breakfast, Trupti Rain PA-C, 35 Units at 08/27/19 0830    ondansetron (ZOFRAN) injection 4 mg, 4 mg, Intravenous, Q4H PRN, Trupti Rain PA-C, 4 mg at 08/19/19 2139    sodium chloride (PF) 0 9 % injection 3 mL, 3 mL, Intravenous, PRN, Trupti Rain PA-C    white petrolatum-mineral oil (EUCERIN,HYDROCERIN) cream, , Topical, BID, Tyson Dai PA-C    REVIEW OF SYSTEMS     Review of Systems   Constitutional: Negative  HENT: Negative  Eyes: Negative  Respiratory: Negative  Cardiovascular: Negative  Gastrointestinal: Negative  Endocrine: Negative  Genitourinary: Negative  Musculoskeletal: Positive for arthralgias and gait problem  Skin: Negative  Allergic/Immunologic: Negative  Hematological: Negative  All other systems reviewed and are negative  OBJECTIVE     Current Weight: Weight - Scale: 77 1 kg (170 lb)  Vitals:    08/27/19 0714   BP: 112/59   Pulse: 75   Resp: 18   Temp: 98 °F (36 7 °C)   SpO2: 98%     Body mass index is 25 85 kg/m²  Intake/Output Summary (Last 24 hours) at 8/27/2019 0948  Last data filed at 8/27/2019 0298  Gross per 24 hour   Intake 600 ml   Output 850 ml   Net -250 ml       PHYSICAL EXAMINATION     Physical Exam   Constitutional: He is oriented to person, place, and time  He appears well-developed and well-nourished  HENT:   Head: Normocephalic and atraumatic  Eyes: Pupils are equal, round, and reactive to light  Neck: Neck supple  Cardiovascular: Normal rate and regular rhythm  Murmur heard  Pulmonary/Chest: Effort normal    Abdominal: Soft  Bowel sounds are normal    Musculoskeletal: Normal range of motion  Right foot dressing intact   Neurological: He is alert and oriented to person, place, and time  Skin: Skin is warm  Psychiatric: He has a normal mood and affect           LAB RESULTS     Results from last 7 days   Lab Units 08/27/19  0436 08/26/19  0853 08/25/19  0558 08/24/19  0441 08/23/19  0542 08/22/19  0541 08/21/19  0443   WBC Thousand/uL 12 90* 13 21* 12 65* 12 82* 9 43 10 10 10 93*   HEMOGLOBIN g/dL 13 3 14 0 13 2 12 1 12 2 12 1 12 2   HEMATOCRIT % 40 7 42 4 40 8 36 1* 37 2 37 0 37 3 PLATELETS Thousands/uL 434* 444* 328 389 391* 394* 377   POTASSIUM mmol/L 3 9 4 2 3 9 4 3  --  4 2 4 4   CHLORIDE mmol/L 98* 95* 99* 99*  --  101 99*   CO2 mmol/L 26 26 24 25  --  26 27   BUN mg/dL 13 12 10 12  --  13 12   CREATININE mg/dL 0 97 1 09 0 90 1 04  --  1 09 1 14   EGFR ml/min/1 73sq m 68 59 75 63  --  59 56   CALCIUM mg/dL 8 7 8 6 8 5 8 3  --  8 3 8 4           RADIOLOGY RESULTS      No results found for this or any previous visit  Results for orders placed during the hospital encounter of 08/19/19   XR chest 2 views    Narrative CHEST     INDICATION:   chest pain  COMPARISON:  September 7, 2010  EXAM PERFORMED/VIEWS:  XR CHEST PA & LATERAL      FINDINGS:  Left-sided chest wall pacemaker is identified  Pacemaker leads are intact  Cardiomediastinal silhouette appears unremarkable  Patient is status post median sternotomy  Surgical clips are noted in the mediastinum  The lungs are clear  No pneumothorax or pleural effusion  Osseous structures appear within normal limits for patient age  Impression No acute cardiopulmonary disease  Workstation performed: [id number]         ASSESSMENT/PLAN       80 M with PMH of DM-2, HTN, CHF, PVD, hyponatremia who presented to our facility with possible osteomyelitis of right foot  1) Chronic Hyponatremia: Etiology likely a combination of hypotonic euvolemic hyponatremia as suggested by elevated U Na, Urine Osm  Recommended increasing protein in his diet and cutting back on daily fluids  Repeat serum sodium improved to 132 today  2  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

## 2019-08-27 NOTE — QUICK NOTE
Patient's chart reviewed briefly  His white blood cell count remains mildly elevated at 12 9 but this is likely due to the elevation in peripheral eosinophils  Patient's other vitals are stable  There is no new culture data  His creatinine remains stable  No other acute events were noted overnight on chart review  As per prior note given his increase in eosinophilia antibiotic course was completed yesterday as the patient has been on therapy both pre and post amputation  Previous podiatry evaluation reviewed on 08/24 and patient's wound site appeared to be healing well  No further antibiotics are recommended at this time  Recommend close follow-up with Podiatry as outpatient and continue local wound care  Would also follow up with patient's CBC with differential and creatinine as an outpatient or additional lab monitoring as per Nephrology  Would recommend reviewing his other medications/exposures given the persistence in his eosinophilia  ID consult service will sign off at this time  Please call if any additional questions or concerns

## 2019-08-27 NOTE — ASSESSMENT & PLAN NOTE
· Dementia, reportedly previously on Donepezil  Monitor for any signs of sundowning  Stable throughout hospitalization

## 2019-08-27 NOTE — ASSESSMENT & PLAN NOTE
Wt Readings from Last 3 Encounters:   08/20/19 77 1 kg (170 lb)   05/28/19 80 7 kg (178 lb)   05/08/19 72 6 kg (160 lb)     · Chronic diastolic CHF on Furosemide, without exacerbation this admission  Monitor I&Os, daily weights

## 2019-08-27 NOTE — ASSESSMENT & PLAN NOTE
Lab Results   Component Value Date    HGBA1C 7 0 (H) 05/10/2019       Recent Labs     08/26/19  1153 08/26/19  1549 08/26/19  2113 08/27/19  1143   POCGLU 252* 260* 188* 233*       · Type 2 diabetes mellitus on Novolin 70/30, resume 25 units BID on discharge, resume metformin on discharge  · Continue consistent carbohydrate diet

## 2019-08-27 NOTE — ASSESSMENT & PLAN NOTE
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

## 2019-08-28 NOTE — SOCIAL WORK
ANTHONY received a call from Paola Peres at [facility] stating that she has been communicating with family in regards to pt transferring to [facility] from [place]  She stated that family informed her that they are not happy with pt being at [place]  She asked ANTHONY to resend the referral and PASRR  CM faxed referral and PASRR to [facility]

## 2019-08-29 DIAGNOSIS — N40.0 BENIGN PROSTATIC HYPERPLASIA, UNSPECIFIED WHETHER LOWER URINARY TRACT SYMPTOMS PRESENT: ICD-10-CM

## 2019-08-29 RX ORDER — OXYBUTYNIN CHLORIDE 5 MG/1
5 TABLET ORAL 2 TIMES DAILY
Qty: 60 TABLET | Refills: 0 | Status: SHIPPED | OUTPATIENT
Start: 2019-08-29 | End: 2019-12-30 | Stop reason: SDUPTHER

## 2019-08-29 NOTE — TELEPHONE ENCOUNTER
[facility] called and stated that patient needs a refill on the oxybutynin sent into [facility]  Please use the contact pharmacy info to send the refill in, thank you

## 2019-09-20 ENCOUNTER — TELEPHONE (OUTPATIENT)
Dept: INTERNAL MEDICINE CLINIC | Facility: CLINIC | Age: 84
End: 2019-09-20

## 2019-09-20 DIAGNOSIS — Z79.4 TYPE 2 DIABETES MELLITUS WITH STAGE 4 CHRONIC KIDNEY DISEASE, WITH LONG-TERM CURRENT USE OF INSULIN (HCC): ICD-10-CM

## 2019-09-20 DIAGNOSIS — N18.4 TYPE 2 DIABETES MELLITUS WITH STAGE 4 CHRONIC KIDNEY DISEASE, WITH LONG-TERM CURRENT USE OF INSULIN (HCC): ICD-10-CM

## 2019-09-20 DIAGNOSIS — E11.22 TYPE 2 DIABETES MELLITUS WITH STAGE 4 CHRONIC KIDNEY DISEASE, WITH LONG-TERM CURRENT USE OF INSULIN (HCC): ICD-10-CM

## 2019-09-23 DIAGNOSIS — N18.4 TYPE 2 DIABETES MELLITUS WITH STAGE 4 CHRONIC KIDNEY DISEASE, WITH LONG-TERM CURRENT USE OF INSULIN (HCC): ICD-10-CM

## 2019-09-23 DIAGNOSIS — Z79.4 TYPE 2 DIABETES MELLITUS WITH STAGE 4 CHRONIC KIDNEY DISEASE, WITH LONG-TERM CURRENT USE OF INSULIN (HCC): ICD-10-CM

## 2019-09-23 DIAGNOSIS — E11.22 TYPE 2 DIABETES MELLITUS WITH STAGE 4 CHRONIC KIDNEY DISEASE, WITH LONG-TERM CURRENT USE OF INSULIN (HCC): ICD-10-CM

## 2019-10-09 DIAGNOSIS — N40.0 BENIGN PROSTATIC HYPERPLASIA, UNSPECIFIED WHETHER LOWER URINARY TRACT SYMPTOMS PRESENT: ICD-10-CM

## 2019-10-09 DIAGNOSIS — I10 ESSENTIAL HYPERTENSION: ICD-10-CM

## 2019-10-09 RX ORDER — FUROSEMIDE 20 MG/1
TABLET ORAL
Qty: 30 TABLET | Refills: 2 | Status: SHIPPED | OUTPATIENT
Start: 2019-10-09 | End: 2019-12-30 | Stop reason: SDUPTHER

## 2019-10-09 RX ORDER — OXYBUTYNIN CHLORIDE 5 MG/1
TABLET ORAL
Qty: 60 TABLET | Refills: 0 | Status: SHIPPED | OUTPATIENT
Start: 2019-10-09 | End: 2019-10-22 | Stop reason: SDUPTHER

## 2019-10-16 ENCOUNTER — TELEPHONE (OUTPATIENT)
Dept: INTERNAL MEDICINE CLINIC | Facility: CLINIC | Age: 84
End: 2019-10-16

## 2019-10-16 ENCOUNTER — TELEPHONE (OUTPATIENT)
Dept: FAMILY MEDICINE CLINIC | Facility: CLINIC | Age: 84
End: 2019-10-16

## 2019-10-16 DIAGNOSIS — Z79.4 TYPE 2 DIABETES MELLITUS WITH STAGE 4 CHRONIC KIDNEY DISEASE, WITH LONG-TERM CURRENT USE OF INSULIN (HCC): ICD-10-CM

## 2019-10-16 DIAGNOSIS — N18.4 TYPE 2 DIABETES MELLITUS WITH STAGE 4 CHRONIC KIDNEY DISEASE, WITH LONG-TERM CURRENT USE OF INSULIN (HCC): ICD-10-CM

## 2019-10-16 DIAGNOSIS — E11.22 TYPE 2 DIABETES MELLITUS WITH STAGE 4 CHRONIC KIDNEY DISEASE, WITH LONG-TERM CURRENT USE OF INSULIN (HCC): ICD-10-CM

## 2019-10-16 RX ORDER — ACETAMINOPHEN/DIPHENHYDRAMINE 500MG-25MG
81 TABLET ORAL DAILY
Refills: 0 | COMMUNITY
Start: 2019-09-19 | End: 2019-10-22 | Stop reason: SDUPTHER

## 2019-10-16 RX ORDER — COLLAGENASE SANTYL 250 [ARB'U]/G
OINTMENT TOPICAL
Refills: 0 | COMMUNITY
Start: 2019-08-17

## 2019-10-16 RX ORDER — AMOXICILLIN AND CLAVULANATE POTASSIUM 875; 125 MG/1; MG/1
1 TABLET, FILM COATED ORAL 2 TIMES DAILY
Refills: 0 | COMMUNITY
Start: 2019-08-06 | End: 2019-10-22 | Stop reason: ALTCHOICE

## 2019-10-16 NOTE — TELEPHONE ENCOUNTER
Joesph from [facility] care [facility] called  Sugars  have been elevated 487 yesterday  320 this morning at 9am        Pt was on 10 units of Humalog  Should he continue with the humlaog ? If so pt needs refill

## 2019-10-16 NOTE — TELEPHONE ENCOUNTER
Blood work done earlier in September blood sugars were within normal range  Who change insulin therapy

## 2019-10-16 NOTE — TELEPHONE ENCOUNTER
Spoke to zac per pt he is currently taking Humalog 10 units daily along with Novolin  35 units 2 times a day

## 2019-10-22 ENCOUNTER — OFFICE VISIT (OUTPATIENT)
Dept: FAMILY MEDICINE CLINIC | Facility: CLINIC | Age: 84
End: 2019-10-22
Payer: MEDICARE

## 2019-10-22 VITALS
DIASTOLIC BLOOD PRESSURE: 64 MMHG | BODY MASS INDEX: 25.61 KG/M2 | OXYGEN SATURATION: 95 % | SYSTOLIC BLOOD PRESSURE: 110 MMHG | HEART RATE: 84 BPM | WEIGHT: 169 LBS | HEIGHT: 68 IN | TEMPERATURE: 97.6 F

## 2019-10-22 DIAGNOSIS — Z79.4 TYPE 2 DIABETES MELLITUS WITH CHRONIC KIDNEY DISEASE, WITH LONG-TERM CURRENT USE OF INSULIN, UNSPECIFIED CKD STAGE (HCC): Primary | ICD-10-CM

## 2019-10-22 DIAGNOSIS — I10 HYPERTENSION, ESSENTIAL: ICD-10-CM

## 2019-10-22 DIAGNOSIS — E11.22 TYPE 2 DIABETES MELLITUS WITH CHRONIC KIDNEY DISEASE, WITH LONG-TERM CURRENT USE OF INSULIN, UNSPECIFIED CKD STAGE (HCC): Primary | ICD-10-CM

## 2019-10-22 PROCEDURE — 99214 OFFICE O/P EST MOD 30 MIN: CPT | Performed by: FAMILY MEDICINE

## 2019-10-22 NOTE — PROGRESS NOTES
Assessment/Plan:     Chronic Problems:  No problem-specific Assessment & Plan notes found for this encounter  Visit Diagnosis:  Diagnoses and all orders for this visit:    Type 2 diabetes mellitus with chronic kidney disease, with long-term current use of insulin, unspecified CKD stage (HCC)    Hypertension, essential      Diabetes, with hyperglycemic episodes  Discussed plan of care with patient and caregiver, observed patient checking blood sugars, patient quite a depth capable of checking blood sugars, in reviewed injections sites with patient apparent scar tissue patient seens to be injecting into discussed alternating injections sites recheck blood sugars call office with numbers, to continue current insulin 70 /30 30 units b i d   htn  Stable, to continue current medications, will recheck BMP      Subjective:    Patient ID: Lexy Ruiz is a 80 y o  male  Here to f/u  With assistant  louis schafer rtcarline past week have cheyanne elevated for no known reason ? Has been noting home blood sugars  No change in insulin therapy reviewed with patient and caregiver  Reviewed injections site patient injects self insulin same site, no changes  Has experienced no hypoglycemic episodes  Last eye exam  Recently seen in emergency room [facility] noted to have blood sugar elevation  No change in therapy prescribed through ER  Blood pressure remains unchanged no change in medications  Negative chest pain palpitations shortness of breath difficulty breathing cough lesions rash  Negative swelling to extremities  Podiatry currently under care laura      The following portions of the patient's history were reviewed and updated as appropriate: allergies, current medications, past family history, past medical history, past social history, past surgical history and problem list     Review of Systems   Constitutional: Negative for appetite change, chills, fever and unexpected weight change     HENT: Negative for congestion, dental problem, ear pain, hearing loss, postnasal drip, rhinorrhea, sinus pressure, sinus pain, sneezing, sore throat, tinnitus and voice change  Eyes: Negative for visual disturbance  Respiratory: Negative for apnea, cough, chest tightness and shortness of breath  Cardiovascular: Negative for chest pain, palpitations and leg swelling  Gastrointestinal: Negative for abdominal pain, blood in stool, constipation, diarrhea, nausea and vomiting  Endocrine: Negative for cold intolerance, heat intolerance, polydipsia, polyphagia and polyuria  Genitourinary: Negative for decreased urine volume, difficulty urinating, dysuria, frequency and hematuria  Musculoskeletal: Negative for arthralgias, back pain, gait problem, joint swelling and myalgias  Skin: Negative for color change, rash and wound  Allergic/Immunologic: Negative for environmental allergies and food allergies  Neurological: Negative for dizziness, syncope, weakness, light-headedness, numbness and headaches  Hematological: Negative for adenopathy  Does not bruise/bleed easily  Psychiatric/Behavioral: Negative for sleep disturbance and suicidal ideas  The patient is not nervous/anxious            /64   Pulse 84   Temp 97 6 °F (36 4 °C)   Ht 5' 8" (1 727 m)   Wt 76 7 kg (169 lb)   SpO2 95%   BMI 25 70 kg/m²   Social History     Socioeconomic History    Marital status: /Civil Union     Spouse name: Not on file    Number of children: Not on file    Years of education: Not on file    Highest education level: Not on file   Occupational History    Not on file   Social Needs    Financial resource strain: Not on file    Food insecurity:     Worry: Not on file     Inability: Not on file    Transportation needs:     Medical: Not on file     Non-medical: Not on file   Tobacco Use    Smoking status: Former Smoker     Packs/day: 1 00     Years: 25 00     Pack years: 25 00     Last attempt to quit: 1973     Years since quittin 8    Smokeless tobacco: Never Used   Substance and Sexual Activity    Alcohol use: Yes     Comment: RARELY     Drug use: No    Sexual activity: Not Currently   Lifestyle    Physical activity:     Days per week: Not on file     Minutes per session: Not on file    Stress: Not on file   Relationships    Social connections:     Talks on phone: Not on file     Gets together: Not on file     Attends Shinto service: Not on file     Active member of club or organization: Not on file     Attends meetings of clubs or organizations: Not on file     Relationship status: Not on file    Intimate partner violence:     Fear of current or ex partner: Not on file     Emotionally abused: Not on file     Physically abused: Not on file     Forced sexual activity: Not on file   Other Topics Concern    Not on file   Social History Narrative    Not on file     Past Medical History:   Diagnosis Date    BPH (benign prostatic hyperplasia)     CAD (coronary artery disease)     Congestive heart failure (CHF) ([facility] )     RESOLVED: [id number]    D[facility] )     Diabetes mellitus ([facility] )     History of colonoscopy     Hyperlipidemia     SSS (sick sinus syndrome) ([facility] )      Family History   Problem Relation Age of Onset    No Known Problems Mother      Past Surgical History:   Procedure Laterality Date    COLONOSCOPY      RESOLVED: 2016    CORONARY ARTERY BYPASS GRAFT      FOUR-VESSEL BYPASS GRAFT INCLUDING INTERNAL MAMMARY TO LAD; RESOLVED: SEP 2010    IR ABDOMINAL ANGIOGRAPHY  2019    WOUND DEBRIDEMENT Right 2019    Procedure: DEBRIDEMENT LOWER EXTREMITY ([address]); Surgeon:  Anita Schuler DPM;  Location: [facility];  Service: Podiatry       Current Outpatient Medications:     aspirin 81 MG tablet, Take 1 tablet by mouth daily, Disp: , Rfl:     cholecalciferol (VITAMIN D3) 1,000 units tablet, Take 1,000 Units by mouth daily, Disp: , Rfl:     docusate sodium (COLACE) 100 mg capsule, Take 1 capsule (100 mg total) by mouth 2 (two) times a day as needed (constipation), Disp: 10 capsule, Rfl: 0    furosemide (LASIX) 20 mg tablet, TAKE ONE tablet BY MOUTH every DAY, Disp: 30 tablet, Rfl: 2    HYDROcodone-acetaminophen (NORCO) 5-325 mg per tablet, Take 1 tablet by mouth every 4 (four) hours as needed (severe pain)Max Daily Amount: 6 tablets, Disp: 4 tablet, Rfl: 0    metFORMIN (GLUCOPHAGE) 500 mg tablet, Take 500 mg by mouth daily, Disp: , Rfl: 0    nitroglycerin (NITROSTAT) 0 4 mg SL tablet, Place under the tongue, Disp: , Rfl:     oxybutynin (DITROPAN) 5 mg tablet, Take 1 tablet (5 mg total) by mouth 2 (two) times a day, Disp: 60 tablet, Rfl: 0    SANTYL ointment, , Disp: , Rfl: 0    white petrolatum-mineral oil (EUCERIN,HYDROCERIN) cream, Apply topically 2 (two) times a day, Disp: , Rfl: 0    BD INSULIN SYRINGE U/F 31G X 5/16" 0 5 ML MISC, 2 (two) times a day, Disp: , Rfl: 0    insulin NPH-insulin regular (NOVOLIN 70/30) 100 units/mL subcutaneous injection, Inject 35 Units under the skin 2 (two) times a day before meals, Disp: , Rfl:     No Known Allergies       Lab Review   No results displayed because visit has over 200 results        Appointment on 05/10/2019   Component Date Value    WBC 05/10/2019 8 76     RBC 05/10/2019 4 80     Hemoglobin 05/10/2019 15 1     Hematocrit 05/10/2019 45 6     MCV 05/10/2019 95     MCH 05/10/2019 31 5     MCHC 05/10/2019 33 1     RDW 05/10/2019 13 3     MPV 05/10/2019 9 5     Platelets 49/99/9726 271     nRBC 05/10/2019 0     Neutrophils Relative 05/10/2019 58     Immat GRANS % 05/10/2019 1     Lymphocytes Relative 05/10/2019 23     Monocytes Relative 05/10/2019 11     Eosinophils Relative 05/10/2019 6     Basophils Relative 05/10/2019 1     Neutrophils Absolute 05/10/2019 5 22     Immature Grans Absolute 05/10/2019 0 04     Lymphocytes Absolute 05/10/2019 1 97     Monocytes Absolute 05/10/2019 0 93     Eosinophils Absolute 05/10/2019 0 54  Basophils Absolute 05/10/2019 0 06     Sodium 05/10/2019 130*    Potassium 05/10/2019 4 8     Chloride 05/10/2019 96*    CO2 05/10/2019 29     ANION GAP 05/10/2019 5     BUN 05/10/2019 12     Creatinine 05/10/2019 1 10     Glucose, Fasting 05/10/2019 108*    Calcium 05/10/2019 8 3     AST 05/10/2019 14     ALT 05/10/2019 24     Alkaline Phosphatase 05/10/2019 49     Total Protein 05/10/2019 7 4     Albumin 05/10/2019 3 5     Total Bilirubin 05/10/2019 0 47     eGFR 05/10/2019 59     Hemoglobin A1C 05/10/2019 7 0*    EAG 05/10/2019 154     Cholesterol 05/10/2019 170     Triglycerides 05/10/2019 143     HDL, Direct 05/10/2019 48     LDL Calculated 05/10/2019 93     Non-HDL-Chol (CHOL-HDL) 05/10/2019 122     Creatinine, Ur 05/10/2019 125 0     Microalbum  ,U,Random 05/10/2019 31 1*    Microalb Creat Ratio 05/10/2019 25     TSH 3RD GENERATON 05/10/2019 2 940     NT-proBNP 05/10/2019 346         Imaging: No results found  Objective:     Physical Exam   Constitutional: He appears well-developed and well-nourished  No distress  HENT:   Head: Normocephalic and atraumatic  Cardiovascular: Normal rate and regular rhythm  Pulmonary/Chest: Effort normal and breath sounds normal    Abdominal:   Scar tissue injection sites         There are no Patient Instructions on file for this visit  TOO Leonard    Portions of the record may have been created with voice recognition software  Occasional wrong word or "sound a like" substitutions may have occurred due to the inherent limitations of voice recognition software  Read the chart carefully and recognize, using context, where substitutions have occurred

## 2019-10-23 ENCOUNTER — TELEPHONE (OUTPATIENT)
Dept: FAMILY MEDICINE CLINIC | Facility: CLINIC | Age: 84
End: 2019-10-23

## 2019-10-25 DIAGNOSIS — N18.4 TYPE 2 DIABETES MELLITUS WITH STAGE 4 CHRONIC KIDNEY DISEASE, WITH LONG-TERM CURRENT USE OF INSULIN (HCC): ICD-10-CM

## 2019-10-25 DIAGNOSIS — Z79.4 TYPE 2 DIABETES MELLITUS WITH STAGE 4 CHRONIC KIDNEY DISEASE, WITH LONG-TERM CURRENT USE OF INSULIN (HCC): ICD-10-CM

## 2019-10-25 DIAGNOSIS — E11.22 TYPE 2 DIABETES MELLITUS WITH STAGE 4 CHRONIC KIDNEY DISEASE, WITH LONG-TERM CURRENT USE OF INSULIN (HCC): ICD-10-CM

## 2019-10-25 RX ORDER — HUMAN INSULIN 100 [USP'U]/ML
INJECTION, SUSPENSION SUBCUTANEOUS
Qty: 10 ML | Refills: 5 | Status: SHIPPED | OUTPATIENT
Start: 2019-10-25 | End: 2020-05-08 | Stop reason: SDUPTHER

## 2019-11-01 DIAGNOSIS — Z79.4 TYPE 2 DIABETES MELLITUS WITHOUT COMPLICATION, WITH LONG-TERM CURRENT USE OF INSULIN (HCC): ICD-10-CM

## 2019-11-01 DIAGNOSIS — E11.9 TYPE 2 DIABETES MELLITUS WITHOUT COMPLICATION, WITH LONG-TERM CURRENT USE OF INSULIN (HCC): ICD-10-CM

## 2019-11-04 RX ORDER — METFORMIN HYDROCHLORIDE 500 MG/1
TABLET, EXTENDED RELEASE ORAL
Qty: 360 TABLET | Refills: 0 | Status: SHIPPED | OUTPATIENT
Start: 2019-11-04 | End: 2020-01-22 | Stop reason: SDUPTHER

## 2019-12-30 DIAGNOSIS — I10 ESSENTIAL HYPERTENSION: ICD-10-CM

## 2019-12-30 DIAGNOSIS — N40.0 BENIGN PROSTATIC HYPERPLASIA, UNSPECIFIED WHETHER LOWER URINARY TRACT SYMPTOMS PRESENT: ICD-10-CM

## 2019-12-30 RX ORDER — FUROSEMIDE 20 MG/1
20 TABLET ORAL DAILY
Qty: 30 TABLET | Refills: 2 | Status: SHIPPED | OUTPATIENT
Start: 2019-12-30 | End: 2020-03-16 | Stop reason: SDUPTHER

## 2019-12-30 RX ORDER — OXYBUTYNIN CHLORIDE 5 MG/1
5 TABLET ORAL 2 TIMES DAILY
Qty: 60 TABLET | Refills: 0 | Status: SHIPPED | OUTPATIENT
Start: 2019-12-30 | End: 2020-01-21 | Stop reason: SDUPTHER

## 2019-12-31 NOTE — TELEPHONE ENCOUNTER
Spoke with patient and informed the patient that the medication refill script has been sent to the pharmacy

## 2020-01-21 ENCOUNTER — TELEPHONE (OUTPATIENT)
Dept: FAMILY MEDICINE CLINIC | Facility: CLINIC | Age: 85
End: 2020-01-21

## 2020-01-21 DIAGNOSIS — Z79.4 TYPE 2 DIABETES MELLITUS WITH CHRONIC KIDNEY DISEASE, WITH LONG-TERM CURRENT USE OF INSULIN, UNSPECIFIED CKD STAGE (HCC): Primary | ICD-10-CM

## 2020-01-21 DIAGNOSIS — N40.0 BENIGN PROSTATIC HYPERPLASIA, UNSPECIFIED WHETHER LOWER URINARY TRACT SYMPTOMS PRESENT: ICD-10-CM

## 2020-01-21 DIAGNOSIS — E11.22 TYPE 2 DIABETES MELLITUS WITH CHRONIC KIDNEY DISEASE, WITH LONG-TERM CURRENT USE OF INSULIN, UNSPECIFIED CKD STAGE (HCC): Primary | ICD-10-CM

## 2020-01-21 RX ORDER — OXYBUTYNIN CHLORIDE 5 MG/1
5 TABLET ORAL 2 TIMES DAILY
Qty: 60 TABLET | Refills: 0 | Status: SHIPPED | OUTPATIENT
Start: 2020-01-21 | End: 2020-02-14 | Stop reason: SDUPTHER

## 2020-01-21 NOTE — TELEPHONE ENCOUNTER
His Rx for Metformin says 500 mg and normally he takes 2000 mg total  Pharmacy called to see if there was a dosage change or if it was a mistake

## 2020-01-22 DIAGNOSIS — E11.9 TYPE 2 DIABETES MELLITUS WITHOUT COMPLICATION, WITH LONG-TERM CURRENT USE OF INSULIN (HCC): ICD-10-CM

## 2020-01-22 DIAGNOSIS — Z79.4 TYPE 2 DIABETES MELLITUS WITHOUT COMPLICATION, WITH LONG-TERM CURRENT USE OF INSULIN (HCC): ICD-10-CM

## 2020-01-22 RX ORDER — METFORMIN HYDROCHLORIDE 500 MG/1
TABLET, EXTENDED RELEASE ORAL
Qty: 360 TABLET | Refills: 0 | Status: SHIPPED | OUTPATIENT
Start: 2020-01-22 | End: 2020-03-19 | Stop reason: ALTCHOICE

## 2020-01-22 NOTE — TELEPHONE ENCOUNTER
Eric Barragan from [employer] called and wanted to verify medication that was sent yesterday the metFORMIN 500 mg, take 1 tablet daily  He stated patient usually takes medication metFORMIN  mg 24HR tablet, 4 tablets daily  Please verify which one is the correct medication

## 2020-02-14 DIAGNOSIS — N40.0 BENIGN PROSTATIC HYPERPLASIA, UNSPECIFIED WHETHER LOWER URINARY TRACT SYMPTOMS PRESENT: ICD-10-CM

## 2020-02-16 RX ORDER — OXYBUTYNIN CHLORIDE 5 MG/1
5 TABLET ORAL 2 TIMES DAILY
Qty: 60 TABLET | Refills: 0 | Status: SHIPPED | OUTPATIENT
Start: 2020-02-16

## 2020-02-25 ENCOUNTER — TRANSITIONAL CARE MANAGEMENT (OUTPATIENT)
Dept: FAMILY MEDICINE CLINIC | Facility: CLINIC | Age: 85
End: 2020-02-25

## 2020-02-26 ENCOUNTER — TELEPHONE (OUTPATIENT)
Dept: INTERNAL MEDICINE CLINIC | Facility: CLINIC | Age: 85
End: 2020-02-26

## 2020-02-26 DIAGNOSIS — L89.610 PRESSURE INJURY OF RIGHT HEEL, UNSTAGEABLE (HCC): Primary | ICD-10-CM

## 2020-02-26 NOTE — TELEPHONE ENCOUNTER
Patient was discharge form [facility] on 2/242020 and sent to [facility] with no order for wound care     Requesting order for  wound care for his right heel         she can be reach at [phone] or [phone]

## 2020-02-27 ENCOUNTER — TELEPHONE (OUTPATIENT)
Dept: FAMILY MEDICINE CLINIC | Facility: CLINIC | Age: 85
End: 2020-02-27

## 2020-02-27 DIAGNOSIS — F02.80 ALZHEIMER'S DEMENTIA WITHOUT BEHAVIORAL DISTURBANCE, UNSPECIFIED TIMING OF DEMENTIA ONSET (HCC): ICD-10-CM

## 2020-02-27 DIAGNOSIS — G30.9 ALZHEIMER'S DEMENTIA WITHOUT BEHAVIORAL DISTURBANCE, UNSPECIFIED TIMING OF DEMENTIA ONSET (HCC): ICD-10-CM

## 2020-02-27 DIAGNOSIS — J43.9 PULMONARY EMPHYSEMA, UNSPECIFIED EMPHYSEMA TYPE (HCC): Primary | ICD-10-CM

## 2020-02-27 NOTE — TELEPHONE ENCOUNTER
Cal Hassan from [facility], requesting nebulizer and toilet bowl   She can be reached [phone]   Fax [phone] [facility]   Thank you

## 2020-02-28 ENCOUNTER — TELEPHONE (OUTPATIENT)
Dept: FAMILY MEDICINE CLINIC | Facility: CLINIC | Age: 85
End: 2020-02-28

## 2020-02-28 NOTE — TELEPHONE ENCOUNTER
Script for nebulizer machine, medical supply company he wishes to use is " [facility]"

## 2020-02-28 NOTE — TELEPHONE ENCOUNTER
wanted to know the status of the toilet seat riser and nebulizer machine, I informed the  that I will forward her request to the provider

## 2020-02-28 NOTE — TELEPHONE ENCOUNTER
Lin Han from [facility], requesting nebulizer and toilet bowl   She can be reached [phone]   Fax [phone] [facility]   Thank you

## 2020-03-03 ENCOUNTER — TELEPHONE (OUTPATIENT)
Dept: FAMILY MEDICINE CLINIC | Facility: CLINIC | Age: 85
End: 2020-03-03

## 2020-03-03 DIAGNOSIS — J45.909 ASTHMA, UNSPECIFIED ASTHMA SEVERITY, UNSPECIFIED WHETHER COMPLICATED, UNSPECIFIED WHETHER PERSISTENT: Primary | ICD-10-CM

## 2020-03-03 NOTE — TELEPHONE ENCOUNTER
She called and said that Therese Rubio was to send a nebulizer machine to [employer]  They have not received it yet  I told her I would speak to you when you got out of a room and see if it was faxed

## 2020-03-05 ENCOUNTER — OFFICE VISIT (OUTPATIENT)
Dept: FAMILY MEDICINE CLINIC | Facility: CLINIC | Age: 85
End: 2020-03-05
Payer: MEDICARE

## 2020-03-05 VITALS
OXYGEN SATURATION: 98 % | RESPIRATION RATE: 15 BRPM | WEIGHT: 158.4 LBS | SYSTOLIC BLOOD PRESSURE: 95 MMHG | TEMPERATURE: 96.8 F | HEART RATE: 73 BPM | DIASTOLIC BLOOD PRESSURE: 50 MMHG | HEIGHT: 68 IN | BODY MASS INDEX: 24.01 KG/M2

## 2020-03-05 DIAGNOSIS — L97.416 DIABETIC ULCER OF RIGHT MIDFOOT ASSOCIATED WITH TYPE 2 DIABETES MELLITUS, WITH BONE INVOLVEMENT WITHOUT EVIDENCE OF NECROSIS (HCC): Primary | ICD-10-CM

## 2020-03-05 DIAGNOSIS — Z79.4 TYPE 2 DIABETES MELLITUS WITH STAGE 4 CHRONIC KIDNEY DISEASE, WITH LONG-TERM CURRENT USE OF INSULIN (HCC): ICD-10-CM

## 2020-03-05 DIAGNOSIS — E11.22 TYPE 2 DIABETES MELLITUS WITH STAGE 4 CHRONIC KIDNEY DISEASE, WITH LONG-TERM CURRENT USE OF INSULIN (HCC): ICD-10-CM

## 2020-03-05 DIAGNOSIS — N18.4 TYPE 2 DIABETES MELLITUS WITH STAGE 4 CHRONIC KIDNEY DISEASE, WITH LONG-TERM CURRENT USE OF INSULIN (HCC): ICD-10-CM

## 2020-03-05 DIAGNOSIS — E11.621 DIABETIC ULCER OF RIGHT MIDFOOT ASSOCIATED WITH TYPE 2 DIABETES MELLITUS, WITH BONE INVOLVEMENT WITHOUT EVIDENCE OF NECROSIS (HCC): Primary | ICD-10-CM

## 2020-03-05 DIAGNOSIS — I25.10 CORONARY ARTERY DISEASE INVOLVING NATIVE HEART WITHOUT ANGINA PECTORIS, UNSPECIFIED VESSEL OR LESION TYPE: ICD-10-CM

## 2020-03-05 DIAGNOSIS — M86.179 OTHER ACUTE OSTEOMYELITIS, UNSPECIFIED ANKLE AND FOOT (HCC): ICD-10-CM

## 2020-03-05 DIAGNOSIS — I74.3 EMBOLISM AND THROMBOSIS OF ARTERIES OF THE LOWER EXTREMITIES (HCC): ICD-10-CM

## 2020-03-05 DIAGNOSIS — I44.2 CHB (COMPLETE HEART BLOCK) (HCC): ICD-10-CM

## 2020-03-05 DIAGNOSIS — I50.32 CHRONIC DIASTOLIC (CONGESTIVE) HEART FAILURE (HCC): ICD-10-CM

## 2020-03-05 PROCEDURE — 99495 TRANSJ CARE MGMT MOD F2F 14D: CPT | Performed by: FAMILY MEDICINE

## 2020-03-05 NOTE — PROGRESS NOTES
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 ([facility] [address] )  -     Ambulatory referral to Podiatry; Future  -     CBC and differential; Future  -     Comprehensive metabolic panel; Future  -     TSH, 3rd generation; Future  -     Lipid panel; Future  -     Hemoglobin A1C; Future  -     Vitamin B12; Future  -     Iron, TIBC and Ferritin Panel; Future    Other acute osteomyelitis, unspecified ankle and foot (HCC)  -     CBC and differential; Future  -     Comprehensive metabolic panel; Future  -     TSH, 3rd generation; Future  -     Lipid panel; Future  -     Hemoglobin A1C; Future  -     Vitamin B12; Future  -     Iron, TIBC and Ferritin Panel; Future    Embolism and thrombosis of arteries of the lower extremities (HCC)  -     CBC and differential; Future  -     Comprehensive metabolic panel; Future  -     TSH, 3rd generation; Future  -     Lipid panel; Future  -     Hemoglobin A1C; Future  -     Vitamin B12; Future  -     Iron, TIBC and Ferritin Panel; Future  -     Cancel: Ambulatory referral to Cardiology; Future    Chronic diastolic (congestive) heart failure (HCC)  -     CBC and differential; Future  -     Comprehensive metabolic panel; Future  -     TSH, 3rd generation; Future  -     Lipid panel; Future  -     Hemoglobin A1C; Future  -     Vitamin B12; Future  -     Iron, TIBC and Ferritin Panel; Future  -     Cancel: Ambulatory referral to Cardiology; Future  -     Ambulatory referral to Cardiology;  Future    Type 2 diabetes mellitus with stage 4 chronic kidney disease, with long-term current use of insulin (HCC)  -     CBC and differential; Future  -     Comprehensive metabolic panel; Future  -     TSH, 3rd generation; Future  -     Lipid panel; Future  -     Hemoglobin A1C; Future  -     Vitamin B12; Future  -     Iron, TIBC and Ferritin Panel; Future    CHB (complete heart block) (HCC)  -     CBC and differential; Future  -     Comprehensive metabolic panel; Future  -     TSH, 3rd generation; Future  -     Lipid panel; Future  -     Hemoglobin A1C; Future  -     Vitamin B12; Future  -     Iron, TIBC and Ferritin Panel; Future  -     Cancel: Ambulatory referral to Cardiology; Future  -     Ambulatory referral to Cardiology; Future    Coronary artery disease involving native heart without angina pectoris, unspecified vessel or lesion type  -     CBC and differential; Future  -     Comprehensive metabolic panel; Future  -     TSH, 3rd generation; Future  -     Lipid panel; Future  -     Hemoglobin A1C; Future  -     Vitamin B12; Future  -     Iron, TIBC and Ferritin Panel; Future  -     Cancel: Ambulatory referral to Cardiology; Future  -     Ambulatory referral to Cardiology; Future       diabetes   discussed plan care with caregiver, appears to be discrepancy between insulin coverage,  Previous order called for insulin 70/30 units b i d  Current receiving daily dosing, with negative mealtime coverage, bring log of blood sugars along with monitor next visit   right foot ulcer   currently under care with wound  Management/ [address] request records   CAD/ pacemaker/   to continue current medications will obtain updated chemistries recommend eval with Cardiology      Subjective:    Patient ID: Karli Leyva is a 80 y o  male  Here to f/u from the hosp/ nursing home  Discharge February 21st   initial diagnosis sepsis   He [facility] on [facility] with complaint of altered mental status, shortness of breath  He was brought in from nursing home      past medical history significant for dementia, CAD stents /CABG permanent pacemaker, diabetes   presently well, at home with live-in caregiver   per caregiver,  Appetite is fine meals 3 times a day with snacks   home blood sugar log not available but does monitor throughout the day, denies any hypoglycemic episodes fasting blood sugar this morning was 190, admittedly late night snack last night a bit excessive   denies swelling to extremities, currently being treated by Podiatry and [address] [place] [address], with follow-up scheduled   previously with indwelling catheter, [place] which has its DC since last admission, denies any urinary issues goes regularly not incontinent caregiver, denies any chest pain shortness of difficulty breathing admits to some fatigue sleeps well throughout the night, does have OT PT coming into home  Here with donald , live in care giver  Celine Moran former siny   [place]   admirals        Dm   Insulin 70 / 30 30u qam   Med instructions per discharge form th ensg home   Neg hypo glycemic  No missed meals , eats throughout the day ,   Snack   No meal time insulin         Right foot infection, secondary to diabetes   Currently under care of podiatry , md ?Seen 2 days ago  also with wound care [facility]   Has scheduled hyperbaric this week   wound care currently by Podiatry, no changes made,      Nausea   Associated symptoms include fatigue  Pertinent negatives include no abdominal pain, arthralgias, chest pain, chills, congestion, coughing, fever, headaches, joint swelling, myalgias, nausea, numbness, rash, sore throat, vomiting or weakness  The following portions of the patient's history were reviewed and updated as appropriate: allergies, current medications, past family history, past medical history, past social history, past surgical history and problem list     Review of Systems   Constitutional: Positive for fatigue  Negative for appetite change, chills, fever and unexpected weight change     HENT: Negative for congestion, dental problem, ear pain, hearing loss, postnasal drip, rhinorrhea, sinus pressure, sinus pain, sneezing, sore throat, tinnitus and voice change  Eyes: Negative for visual disturbance  Respiratory: Negative for apnea, cough, chest tightness and shortness of breath  Cardiovascular: Negative for chest pain, palpitations and leg swelling  Gastrointestinal: Negative for abdominal pain, blood in stool, constipation, diarrhea, nausea and vomiting  Endocrine: Negative for cold intolerance, heat intolerance, polydipsia, polyphagia and polyuria  Genitourinary: Negative for decreased urine volume, difficulty urinating, dysuria, frequency and hematuria  Musculoskeletal: Negative for arthralgias, back pain, gait problem, joint swelling and myalgias  Skin: Positive for wound ( right foot)  Negative for color change and rash  Allergic/Immunologic: Negative for environmental allergies and food allergies  Neurological: Negative for dizziness, syncope, weakness, light-headedness, numbness and headaches  Hematological: Negative for adenopathy  Does not bruise/bleed easily  Psychiatric/Behavioral: Negative for sleep disturbance and suicidal ideas  The patient is not nervous/anxious            BP 95/50 (BP Location: Left arm, Patient Position: Sitting, Cuff Size: Adult)   Pulse 73   Temp (!) 96 8 °F (36 °C)   Resp 15   Ht 5' 8" (1 727 m)   Wt 71 8 kg (158 lb 6 4 oz) Comment: With Clothes  SpO2 98%   BMI 24 08 kg/m²   Social History     Socioeconomic History    Marital status: /Civil Union     Spouse name: Not on file    Number of children: Not on file    Years of education: Not on file    Highest education level: Not on file   Occupational History    Not on file   Social Needs    Financial resource strain: Not on file    Food insecurity:     Worry: Not on file     Inability: Not on file    Transportation needs:     Medical: Not on file     Non-medical: Not on file Tobacco Use    Smoking status: Former Smoker     Packs/day: 1 00     Years: 25 00     Pack years: 25 00     Last attempt to quit: 1973     Years since quittin 3    Smokeless tobacco: Never Used   Substance and Sexual Activity    Alcohol use: Yes     Comment: RARELY     Drug use: No    Sexual activity: Not Currently   Lifestyle    Physical activity:     Days per week: Not on file     Minutes per session: Not on file    Stress: Not on file   Relationships    Social connections:     Talks on phone: Not on file     Gets together: Not on file     Attends [employer] service: Not on file     Active member of club or organization: Not on file     Attends meetings of clubs or organizations: Not on file     Relationship status: Not on file    Intimate partner violence:     Fear of current or ex partner: Not on file     Emotionally abused: Not on file     Physically abused: Not on file     Forced sexual activity: Not on file   Other Topics Concern    Not on file   Social History Narrative    Not on file     Past Medical History:   Diagnosis Date    BPH (benign prostatic hyperplasia)     CAD (coronary artery disease)     Congestive heart failure (CHF) ([facility] )     RESOLVED: [id number]    Dementia ([place] [id number] )     Diabetes mellitus ([facility] )     History of colonoscopy     Hyperlipidemia     SSS (sick sinus syndrome) ([facility] )      Family History   Problem Relation Age of Onset    No Known Problems Mother      Past Surgical History:   Procedure Laterality Date    COLONOSCOPY      RESOLVED: 2016    CORONARY ARTERY BYPASS GRAFT      FOUR-VESSEL BYPASS GRAFT INCLUDING INTERNAL MAMMARY TO LAD; RESOLVED: SEP 2010    IR ABDOMINAL ANGIOGRAPHY  2019    WOUND DEBRIDEMENT Right 2019    Procedure: DEBRIDEMENT LOWER EXTREMITY ([address]); Surgeon:  Neida Strauss DPM;  Location: [facility];  Service: Podiatry       Current Outpatient Medications:     aspirin 81 MG tablet, Take 1 tablet by mouth daily, Disp: , Rfl:   BD INSULIN SYRINGE U/F 31G X 5/16" 0 5 ML MISC, 2 (two) times a day, Disp: , Rfl: 0    cholecalciferol (VITAMIN D3) 1,000 units tablet, Take 1,000 Units by mouth daily, Disp: , Rfl:     docusate sodium (COLACE) 100 mg capsule, Take 1 capsule (100 mg total) by mouth 2 (two) times a day as needed (constipation), Disp: 10 capsule, Rfl: 0    furosemide (LASIX) 20 mg tablet, Take 1 tablet (20 mg total) by mouth daily, Disp: 30 tablet, Rfl: 2    HYDROcodone-acetaminophen (NORCO) 5-325 mg per tablet, Take 1 tablet by mouth every 4 (four) hours as needed (severe pain)Max Daily Amount: 6 tablets, Disp: 4 tablet, Rfl: 0    insulin NPH-insulin regular (NOVOLIN 70/30) 100 units/mL subcutaneous injection, Inject 35 Units under the skin 2 (two) times a day before meals, Disp: , Rfl:     metFORMIN (GLUCOPHAGE) 500 mg tablet, Take 1 tablet (500 mg total) by mouth daily, Disp: 360 tablet, Rfl: 0    metFORMIN (GLUCOPHAGE-XR) 500 mg 24 hr tablet, 4 tabs qd, Disp: 360 tablet, Rfl: 0    nitroglycerin (NITROSTAT) 0 4 mg SL tablet, Place under the tongue, Disp: , Rfl:     NOVOLIN 70/30 (70-30) 100 UNIT/ML subcutaneous injection, inject 30 units subcutaneously twice a day, Disp: 10 mL, Rfl: 5    oxybutynin (DITROPAN) 5 mg tablet, Take 1 tablet (5 mg total) by mouth 2 (two) times a day, Disp: 60 tablet, Rfl: 0    SANTYL ointment, , Disp: , Rfl: 0    white petrolatum-mineral oil (EUCERIN,HYDROCERIN) cream, Apply topically 2 (two) times a day, Disp: , Rfl: 0    No Known Allergies       Lab Review   No visits with results within 6 Month(s) from this visit  Latest known visit with results is:   No results displayed because visit has over 200 results  Imaging: No results found  Objective:     Physical Exam   Constitutional: He is oriented to person, place, and time  He appears well-developed and well-nourished  No distress     Chronically ill in appearance   alert conversational   HENT:   Head: Normocephalic and atraumatic  Right Ear: External ear normal    Left Ear: External ear normal    Eyes: EOM are normal    Neck: Normal range of motion  Neck supple  Cardiovascular: Normal rate, regular rhythm and normal heart sounds  Left chest wall ppm   Pulmonary/Chest: Effort normal and breath sounds normal    Abdominal: Soft  Bowel sounds are normal    Musculoskeletal: Normal range of motion  Neurological: He is alert and oriented to person, place, and time  He has normal reflexes  Skin: Skin is warm and dry  Right foot   right heel/midfoot wound, with dressing in place   2nd toe, with dressing in place     Psychiatric: He has a normal mood and affect  His behavior is normal  Judgment and thought content normal          There are no Patient Instructions on file for this visit  Matt Standard, CRNP    Portions of the record may have been created with voice recognition software  Occasional wrong word or "sound a like" substitutions may have occurred due to the inherent limitations of voice recognition software  Read the chart carefully and recognize, using context, where substitutions have occurred

## 2020-03-06 ENCOUNTER — TELEPHONE (OUTPATIENT)
Dept: FAMILY MEDICINE CLINIC | Facility: CLINIC | Age: 85
End: 2020-03-06

## 2020-03-06 NOTE — TELEPHONE ENCOUNTER
Called when the computers were down  Patient's Podiatrist is Dr Taty Donis  The insulin he takes is Novalin 70/30 100 units ml omaira

## 2020-03-11 ENCOUNTER — TELEPHONE (OUTPATIENT)
Dept: FAMILY MEDICINE CLINIC | Facility: CLINIC | Age: 85
End: 2020-03-11

## 2020-03-11 DIAGNOSIS — G30.9 ALZHEIMER'S DEMENTIA WITHOUT BEHAVIORAL DISTURBANCE, UNSPECIFIED TIMING OF DEMENTIA ONSET (HCC): Primary | ICD-10-CM

## 2020-03-11 DIAGNOSIS — F02.80 ALZHEIMER'S DEMENTIA WITHOUT BEHAVIORAL DISTURBANCE, UNSPECIFIED TIMING OF DEMENTIA ONSET (HCC): Primary | ICD-10-CM

## 2020-03-11 NOTE — TELEPHONE ENCOUNTER
Requested a script for nebulizer and they got that  [facility] did not get script for Commode   They would like it to be put in his chart and faxed to [facility]

## 2020-03-16 DIAGNOSIS — Z79.4 TYPE 2 DIABETES MELLITUS WITH CHRONIC KIDNEY DISEASE, WITH LONG-TERM CURRENT USE OF INSULIN, UNSPECIFIED CKD STAGE (HCC): ICD-10-CM

## 2020-03-16 DIAGNOSIS — E11.22 TYPE 2 DIABETES MELLITUS WITH CHRONIC KIDNEY DISEASE, WITH LONG-TERM CURRENT USE OF INSULIN, UNSPECIFIED CKD STAGE (HCC): ICD-10-CM

## 2020-03-16 DIAGNOSIS — I10 ESSENTIAL HYPERTENSION: ICD-10-CM

## 2020-03-16 DIAGNOSIS — I25.10 CORONARY ARTERY DISEASE INVOLVING NATIVE HEART WITHOUT ANGINA PECTORIS, UNSPECIFIED VESSEL OR LESION TYPE: Primary | ICD-10-CM

## 2020-03-16 RX ORDER — NITROGLYCERIN 0.4 MG/1
0.4 TABLET SUBLINGUAL
Qty: 30 TABLET | Refills: 0 | Status: SHIPPED | OUTPATIENT
Start: 2020-03-16

## 2020-03-16 RX ORDER — FUROSEMIDE 20 MG/1
20 TABLET ORAL DAILY
Qty: 30 TABLET | Refills: 2 | Status: SHIPPED | OUTPATIENT
Start: 2020-03-16 | End: 2020-03-23 | Stop reason: SDUPTHER

## 2020-03-18 DIAGNOSIS — E11.22 TYPE 2 DIABETES MELLITUS WITH STAGE 4 CHRONIC KIDNEY DISEASE, WITH LONG-TERM CURRENT USE OF INSULIN (HCC): ICD-10-CM

## 2020-03-18 DIAGNOSIS — N18.4 TYPE 2 DIABETES MELLITUS WITH STAGE 4 CHRONIC KIDNEY DISEASE, WITH LONG-TERM CURRENT USE OF INSULIN (HCC): ICD-10-CM

## 2020-03-18 DIAGNOSIS — Z79.4 TYPE 2 DIABETES MELLITUS WITH STAGE 4 CHRONIC KIDNEY DISEASE, WITH LONG-TERM CURRENT USE OF INSULIN (HCC): ICD-10-CM

## 2020-03-19 DIAGNOSIS — E11.9 TYPE 2 DIABETES MELLITUS WITHOUT COMPLICATION, WITH LONG-TERM CURRENT USE OF INSULIN (HCC): ICD-10-CM

## 2020-03-19 DIAGNOSIS — Z79.4 TYPE 2 DIABETES MELLITUS WITH CHRONIC KIDNEY DISEASE, WITH LONG-TERM CURRENT USE OF INSULIN, UNSPECIFIED CKD STAGE (HCC): ICD-10-CM

## 2020-03-19 DIAGNOSIS — Z79.4 TYPE 2 DIABETES MELLITUS WITHOUT COMPLICATION, WITH LONG-TERM CURRENT USE OF INSULIN (HCC): ICD-10-CM

## 2020-03-19 DIAGNOSIS — E11.22 TYPE 2 DIABETES MELLITUS WITH CHRONIC KIDNEY DISEASE, WITH LONG-TERM CURRENT USE OF INSULIN, UNSPECIFIED CKD STAGE (HCC): ICD-10-CM

## 2020-03-19 DIAGNOSIS — I10 ESSENTIAL HYPERTENSION: ICD-10-CM

## 2020-03-19 NOTE — TELEPHONE ENCOUNTER
[facility] needs verification on his medication doses  Metformin they have 1000 mg one tab BID  In our record it is 500 mg      Lisinopril 2 5 mg one tab qd  Ordered in hospital  Furosemide 20 mg one tab qd  Metoprolol 50 mg one tab qd

## 2020-03-20 ENCOUNTER — TELEPHONE (OUTPATIENT)
Dept: FAMILY MEDICINE CLINIC | Facility: CLINIC | Age: 85
End: 2020-03-20

## 2020-03-22 DIAGNOSIS — I10 HYPERTENSION, ESSENTIAL: Primary | ICD-10-CM

## 2020-03-22 DIAGNOSIS — I50.32 CHRONIC DIASTOLIC CONGESTIVE HEART FAILURE (HCC): ICD-10-CM

## 2020-03-22 RX ORDER — LISINOPRIL 2.5 MG/1
2.5 TABLET ORAL DAILY
Qty: 90 TABLET | Refills: 1 | Status: SHIPPED | OUTPATIENT
Start: 2020-03-22 | End: 2020-03-23 | Stop reason: SDUPTHER

## 2020-03-22 RX ORDER — METOPROLOL SUCCINATE 50 MG/1
50 TABLET, EXTENDED RELEASE ORAL DAILY
Qty: 30 TABLET | Refills: 1 | Status: SHIPPED | OUTPATIENT
Start: 2020-03-22 | End: 2020-03-23 | Stop reason: SDUPTHER

## 2020-03-23 DIAGNOSIS — Z79.4 TYPE 2 DIABETES MELLITUS WITH CHRONIC KIDNEY DISEASE, WITH LONG-TERM CURRENT USE OF INSULIN, UNSPECIFIED CKD STAGE (HCC): ICD-10-CM

## 2020-03-23 DIAGNOSIS — I50.32 CHRONIC DIASTOLIC CONGESTIVE HEART FAILURE (HCC): ICD-10-CM

## 2020-03-23 DIAGNOSIS — E11.22 TYPE 2 DIABETES MELLITUS WITH CHRONIC KIDNEY DISEASE, WITH LONG-TERM CURRENT USE OF INSULIN, UNSPECIFIED CKD STAGE (HCC): ICD-10-CM

## 2020-03-23 DIAGNOSIS — I10 ESSENTIAL HYPERTENSION: ICD-10-CM

## 2020-03-23 DIAGNOSIS — I10 HYPERTENSION, ESSENTIAL: ICD-10-CM

## 2020-03-23 RX ORDER — LISINOPRIL 2.5 MG/1
2.5 TABLET ORAL DAILY
Qty: 90 TABLET | Refills: 1 | Status: SHIPPED | OUTPATIENT
Start: 2020-03-23 | End: 2020-07-30 | Stop reason: SDUPTHER

## 2020-03-23 RX ORDER — FUROSEMIDE 20 MG/1
20 TABLET ORAL DAILY
Qty: 30 TABLET | Refills: 2 | Status: CANCELLED | OUTPATIENT
Start: 2020-03-23

## 2020-03-23 RX ORDER — METOPROLOL SUCCINATE 50 MG/1
50 TABLET, EXTENDED RELEASE ORAL DAILY
Qty: 30 TABLET | Refills: 1 | Status: SHIPPED | OUTPATIENT
Start: 2020-03-23 | End: 2020-05-11 | Stop reason: SDUPTHER

## 2020-03-23 RX ORDER — FUROSEMIDE 20 MG/1
20 TABLET ORAL DAILY
Qty: 30 TABLET | Refills: 2 | Status: SHIPPED | OUTPATIENT
Start: 2020-03-23 | End: 2020-10-22 | Stop reason: SDUPTHER

## 2020-03-23 NOTE — TELEPHONE ENCOUNTER
Patient also needs the metoprolol and lisinopril sent to [facility] as well but it will not let me load them in the message

## 2020-03-23 NOTE — TELEPHONE ENCOUNTER
Called primary number on file and spoke with patient's caretaker and confirmed medication  It looks like the refills were sent into the wrong pharmacy  They should go [facility]

## 2020-03-24 ENCOUNTER — TELEPHONE (OUTPATIENT)
Dept: INTERNAL MEDICINE CLINIC | Facility: CLINIC | Age: 85
End: 2020-03-24

## 2020-03-24 NOTE — TELEPHONE ENCOUNTER
ENDER:    Vidal Dorantes from [facility]   Patient would like service to be place on hold at this time because of COVID -19

## 2020-04-28 DIAGNOSIS — E11.22 TYPE 2 DIABETES MELLITUS WITH CHRONIC KIDNEY DISEASE, WITH LONG-TERM CURRENT USE OF INSULIN, UNSPECIFIED CKD STAGE (HCC): Primary | ICD-10-CM

## 2020-04-28 DIAGNOSIS — Z79.4 TYPE 2 DIABETES MELLITUS WITH CHRONIC KIDNEY DISEASE, WITH LONG-TERM CURRENT USE OF INSULIN, UNSPECIFIED CKD STAGE (HCC): Primary | ICD-10-CM

## 2020-04-29 RX ORDER — PEN NEEDLE, DIABETIC 29 G X1/2"
NEEDLE, DISPOSABLE MISCELLANEOUS 2 TIMES DAILY
Qty: 3 EACH | Refills: 0 | Status: SHIPPED | OUTPATIENT
Start: 2020-04-29 | End: 2020-05-08 | Stop reason: SDUPTHER

## 2020-05-08 DIAGNOSIS — N18.4 TYPE 2 DIABETES MELLITUS WITH STAGE 4 CHRONIC KIDNEY DISEASE, WITH LONG-TERM CURRENT USE OF INSULIN (HCC): ICD-10-CM

## 2020-05-08 DIAGNOSIS — Z79.4 TYPE 2 DIABETES MELLITUS WITH STAGE 4 CHRONIC KIDNEY DISEASE, WITH LONG-TERM CURRENT USE OF INSULIN (HCC): ICD-10-CM

## 2020-05-08 DIAGNOSIS — E11.22 TYPE 2 DIABETES MELLITUS WITH STAGE 4 CHRONIC KIDNEY DISEASE, WITH LONG-TERM CURRENT USE OF INSULIN (HCC): ICD-10-CM

## 2020-05-08 DIAGNOSIS — E11.22 TYPE 2 DIABETES MELLITUS WITH CHRONIC KIDNEY DISEASE, WITH LONG-TERM CURRENT USE OF INSULIN, UNSPECIFIED CKD STAGE (HCC): ICD-10-CM

## 2020-05-08 DIAGNOSIS — Z79.4 TYPE 2 DIABETES MELLITUS WITH CHRONIC KIDNEY DISEASE, WITH LONG-TERM CURRENT USE OF INSULIN, UNSPECIFIED CKD STAGE (HCC): ICD-10-CM

## 2020-05-11 DIAGNOSIS — I10 HYPERTENSION, ESSENTIAL: ICD-10-CM

## 2020-05-11 DIAGNOSIS — I50.32 CHRONIC DIASTOLIC CONGESTIVE HEART FAILURE (HCC): ICD-10-CM

## 2020-05-11 RX ORDER — PEN NEEDLE, DIABETIC 29 G X1/2"
NEEDLE, DISPOSABLE MISCELLANEOUS 2 TIMES DAILY
Qty: 120 EACH | Refills: 3 | Status: SHIPPED | OUTPATIENT
Start: 2020-05-11 | End: 2020-09-01 | Stop reason: SDUPTHER

## 2020-05-11 RX ORDER — METOPROLOL SUCCINATE 50 MG/1
50 TABLET, EXTENDED RELEASE ORAL DAILY
Qty: 30 TABLET | Refills: 1 | Status: SHIPPED | OUTPATIENT
Start: 2020-05-11 | End: 2020-06-09 | Stop reason: SDUPTHER

## 2020-05-26 ENCOUNTER — APPOINTMENT (OUTPATIENT)
Dept: LAB | Facility: CLINIC | Age: 85
End: 2020-05-26
Payer: MEDICARE

## 2020-05-26 DIAGNOSIS — E11.22 TYPE 2 DIABETES MELLITUS WITH STAGE 4 CHRONIC KIDNEY DISEASE, WITH LONG-TERM CURRENT USE OF INSULIN (HCC): ICD-10-CM

## 2020-05-26 DIAGNOSIS — M86.179 OTHER ACUTE OSTEOMYELITIS, UNSPECIFIED ANKLE AND FOOT (HCC): ICD-10-CM

## 2020-05-26 DIAGNOSIS — L97.416 DIABETIC ULCER OF RIGHT MIDFOOT ASSOCIATED WITH TYPE 2 DIABETES MELLITUS, WITH BONE INVOLVEMENT WITHOUT EVIDENCE OF NECROSIS (HCC): ICD-10-CM

## 2020-05-26 DIAGNOSIS — I25.10 CORONARY ARTERY DISEASE INVOLVING NATIVE HEART WITHOUT ANGINA PECTORIS, UNSPECIFIED VESSEL OR LESION TYPE: Primary | ICD-10-CM

## 2020-05-26 DIAGNOSIS — N18.4 TYPE 2 DIABETES MELLITUS WITH STAGE 4 CHRONIC KIDNEY DISEASE, WITH LONG-TERM CURRENT USE OF INSULIN (HCC): ICD-10-CM

## 2020-05-26 DIAGNOSIS — I50.32 CHRONIC DIASTOLIC (CONGESTIVE) HEART FAILURE (HCC): ICD-10-CM

## 2020-05-26 DIAGNOSIS — I44.2 CHB (COMPLETE HEART BLOCK) (HCC): ICD-10-CM

## 2020-05-26 DIAGNOSIS — Z79.4 TYPE 2 DIABETES MELLITUS WITH STAGE 4 CHRONIC KIDNEY DISEASE, WITH LONG-TERM CURRENT USE OF INSULIN (HCC): ICD-10-CM

## 2020-05-26 DIAGNOSIS — E11.621 DIABETIC ULCER OF RIGHT MIDFOOT ASSOCIATED WITH TYPE 2 DIABETES MELLITUS, WITH BONE INVOLVEMENT WITHOUT EVIDENCE OF NECROSIS (HCC): ICD-10-CM

## 2020-05-26 DIAGNOSIS — I74.3 EMBOLISM AND THROMBOSIS OF ARTERIES OF THE LOWER EXTREMITIES (HCC): ICD-10-CM

## 2020-05-26 LAB
ALBUMIN SERPL BCP-MCNC: 3.5 G/DL (ref 3.5–5)
ALP SERPL-CCNC: 61 U/L (ref 46–116)
ALT SERPL W P-5'-P-CCNC: 18 U/L (ref 12–78)
ANION GAP SERPL CALCULATED.3IONS-SCNC: 7 MMOL/L (ref 4–13)
AST SERPL W P-5'-P-CCNC: 11 U/L (ref 5–45)
BASOPHILS # BLD AUTO: 0.08 THOUSANDS/ΜL (ref 0–0.1)
BASOPHILS NFR BLD AUTO: 1 % (ref 0–1)
BILIRUB SERPL-MCNC: 0.39 MG/DL (ref 0.2–1)
BUN SERPL-MCNC: 14 MG/DL (ref 5–25)
CALCIUM SERPL-MCNC: 9.6 MG/DL (ref 8.3–10.1)
CHLORIDE SERPL-SCNC: 93 MMOL/L (ref 100–108)
CHOLEST SERPL-MCNC: 180 MG/DL (ref 50–200)
CO2 SERPL-SCNC: 27 MMOL/L (ref 21–32)
CREAT SERPL-MCNC: 1.04 MG/DL (ref 0.6–1.3)
EOSINOPHIL # BLD AUTO: 0.72 THOUSAND/ΜL (ref 0–0.61)
EOSINOPHIL NFR BLD AUTO: 7 % (ref 0–6)
ERYTHROCYTE [DISTWIDTH] IN BLOOD BY AUTOMATED COUNT: 14.4 % (ref 11.6–15.1)
EST. AVERAGE GLUCOSE BLD GHB EST-MCNC: 146 MG/DL
FERRITIN SERPL-MCNC: 92 NG/ML (ref 8–388)
GFR SERPL CREATININE-BSD FRML MDRD: 62 ML/MIN/1.73SQ M
GLUCOSE P FAST SERPL-MCNC: 115 MG/DL (ref 65–99)
HBA1C MFR BLD: 6.7 %
HCT VFR BLD AUTO: 40 % (ref 36.5–49.3)
HDLC SERPL-MCNC: 41 MG/DL
HGB BLD-MCNC: 13 G/DL (ref 12–17)
IMM GRANULOCYTES # BLD AUTO: 0.05 THOUSAND/UL (ref 0–0.2)
IMM GRANULOCYTES NFR BLD AUTO: 1 % (ref 0–2)
IRON SATN MFR SERPL: 25 %
IRON SERPL-MCNC: 73 UG/DL (ref 65–175)
LDLC SERPL CALC-MCNC: 93 MG/DL (ref 0–100)
LYMPHOCYTES # BLD AUTO: 2.39 THOUSANDS/ΜL (ref 0.6–4.47)
LYMPHOCYTES NFR BLD AUTO: 23 % (ref 14–44)
MCH RBC QN AUTO: 30 PG (ref 26.8–34.3)
MCHC RBC AUTO-ENTMCNC: 32.5 G/DL (ref 31.4–37.4)
MCV RBC AUTO: 92 FL (ref 82–98)
MONOCYTES # BLD AUTO: 0.99 THOUSAND/ΜL (ref 0.17–1.22)
MONOCYTES NFR BLD AUTO: 10 % (ref 4–12)
NEUTROPHILS # BLD AUTO: 6.07 THOUSANDS/ΜL (ref 1.85–7.62)
NEUTS SEG NFR BLD AUTO: 58 % (ref 43–75)
NONHDLC SERPL-MCNC: 139 MG/DL
NRBC BLD AUTO-RTO: 0 /100 WBCS
PLATELET # BLD AUTO: 365 THOUSANDS/UL (ref 149–390)
PMV BLD AUTO: 9 FL (ref 8.9–12.7)
POTASSIUM SERPL-SCNC: 4.9 MMOL/L (ref 3.5–5.3)
PROT SERPL-MCNC: 7.8 G/DL (ref 6.4–8.2)
RBC # BLD AUTO: 4.33 MILLION/UL (ref 3.88–5.62)
SODIUM SERPL-SCNC: 127 MMOL/L (ref 136–145)
TIBC SERPL-MCNC: 295 UG/DL (ref 250–450)
TRIGL SERPL-MCNC: 232 MG/DL
TSH SERPL DL<=0.05 MIU/L-ACNC: 3.89 UIU/ML (ref 0.36–3.74)
VIT B12 SERPL-MCNC: 289 PG/ML (ref 100–900)
WBC # BLD AUTO: 10.3 THOUSAND/UL (ref 4.31–10.16)

## 2020-05-26 PROCEDURE — 84443 ASSAY THYROID STIM HORMONE: CPT

## 2020-05-26 PROCEDURE — 82728 ASSAY OF FERRITIN: CPT

## 2020-05-26 PROCEDURE — 36415 COLL VENOUS BLD VENIPUNCTURE: CPT

## 2020-05-26 PROCEDURE — 83540 ASSAY OF IRON: CPT

## 2020-05-26 PROCEDURE — 80061 LIPID PANEL: CPT

## 2020-05-26 PROCEDURE — 83550 IRON BINDING TEST: CPT

## 2020-05-26 PROCEDURE — 80053 COMPREHEN METABOLIC PANEL: CPT

## 2020-05-26 PROCEDURE — 85025 COMPLETE CBC W/AUTO DIFF WBC: CPT

## 2020-05-26 PROCEDURE — 83036 HEMOGLOBIN GLYCOSYLATED A1C: CPT

## 2020-05-26 PROCEDURE — 82607 VITAMIN B-12: CPT

## 2020-05-28 ENCOUNTER — TELEPHONE (OUTPATIENT)
Dept: FAMILY MEDICINE CLINIC | Facility: CLINIC | Age: 85
End: 2020-05-28

## 2020-06-01 ENCOUNTER — TELEPHONE (OUTPATIENT)
Dept: FAMILY MEDICINE CLINIC | Facility: CLINIC | Age: 85
End: 2020-06-01

## 2020-06-09 DIAGNOSIS — I10 HYPERTENSION, ESSENTIAL: ICD-10-CM

## 2020-06-09 DIAGNOSIS — I50.32 CHRONIC DIASTOLIC CONGESTIVE HEART FAILURE (HCC): ICD-10-CM

## 2020-06-09 RX ORDER — METOPROLOL SUCCINATE 50 MG/1
50 TABLET, EXTENDED RELEASE ORAL DAILY
Qty: 30 TABLET | Refills: 1 | Status: SHIPPED | OUTPATIENT
Start: 2020-06-09 | End: 2020-08-25 | Stop reason: SDUPTHER

## 2020-06-10 ENCOUNTER — TRANSCRIBE ORDERS (OUTPATIENT)
Dept: LAB | Facility: CLINIC | Age: 85
End: 2020-06-10

## 2020-06-10 ENCOUNTER — APPOINTMENT (OUTPATIENT)
Dept: LAB | Facility: CLINIC | Age: 85
End: 2020-06-10
Payer: MEDICARE

## 2020-06-10 DIAGNOSIS — R22.0 SCALP MASS: Primary | ICD-10-CM

## 2020-06-10 DIAGNOSIS — Z01.818 PRE-OP TESTING: ICD-10-CM

## 2020-06-10 DIAGNOSIS — R22.0 SCALP MASS: ICD-10-CM

## 2020-06-10 LAB
ANION GAP SERPL CALCULATED.3IONS-SCNC: 8 MMOL/L (ref 4–13)
BASOPHILS # BLD AUTO: 0.06 THOUSANDS/ΜL (ref 0–0.1)
BASOPHILS NFR BLD AUTO: 1 % (ref 0–1)
BUN SERPL-MCNC: 18 MG/DL (ref 5–25)
CALCIUM SERPL-MCNC: 9.6 MG/DL (ref 8.3–10.1)
CHLORIDE SERPL-SCNC: 96 MMOL/L (ref 100–108)
CO2 SERPL-SCNC: 28 MMOL/L (ref 21–32)
CREAT SERPL-MCNC: 1.07 MG/DL (ref 0.6–1.3)
EOSINOPHIL # BLD AUTO: 0.58 THOUSAND/ΜL (ref 0–0.61)
EOSINOPHIL NFR BLD AUTO: 6 % (ref 0–6)
ERYTHROCYTE [DISTWIDTH] IN BLOOD BY AUTOMATED COUNT: 14.7 % (ref 11.6–15.1)
GFR SERPL CREATININE-BSD FRML MDRD: 60 ML/MIN/1.73SQ M
GLUCOSE SERPL-MCNC: 144 MG/DL (ref 65–140)
HCT VFR BLD AUTO: 41.2 % (ref 36.5–49.3)
HGB BLD-MCNC: 13.1 G/DL (ref 12–17)
IMM GRANULOCYTES # BLD AUTO: 0.04 THOUSAND/UL (ref 0–0.2)
IMM GRANULOCYTES NFR BLD AUTO: 0 % (ref 0–2)
LYMPHOCYTES # BLD AUTO: 2.02 THOUSANDS/ΜL (ref 0.6–4.47)
LYMPHOCYTES NFR BLD AUTO: 20 % (ref 14–44)
MCH RBC QN AUTO: 29.8 PG (ref 26.8–34.3)
MCHC RBC AUTO-ENTMCNC: 31.8 G/DL (ref 31.4–37.4)
MCV RBC AUTO: 94 FL (ref 82–98)
MONOCYTES # BLD AUTO: 1.05 THOUSAND/ΜL (ref 0.17–1.22)
MONOCYTES NFR BLD AUTO: 10 % (ref 4–12)
NEUTROPHILS # BLD AUTO: 6.34 THOUSANDS/ΜL (ref 1.85–7.62)
NEUTS SEG NFR BLD AUTO: 63 % (ref 43–75)
NRBC BLD AUTO-RTO: 0 /100 WBCS
PLATELET # BLD AUTO: 397 THOUSANDS/UL (ref 149–390)
PMV BLD AUTO: 8.9 FL (ref 8.9–12.7)
POTASSIUM SERPL-SCNC: 4.5 MMOL/L (ref 3.5–5.3)
RBC # BLD AUTO: 4.4 MILLION/UL (ref 3.88–5.62)
SODIUM SERPL-SCNC: 132 MMOL/L (ref 136–145)
WBC # BLD AUTO: 10.09 THOUSAND/UL (ref 4.31–10.16)

## 2020-06-10 PROCEDURE — 80048 BASIC METABOLIC PNL TOTAL CA: CPT

## 2020-06-10 PROCEDURE — 85025 COMPLETE CBC W/AUTO DIFF WBC: CPT

## 2020-06-10 PROCEDURE — 36415 COLL VENOUS BLD VENIPUNCTURE: CPT

## 2020-06-11 ENCOUNTER — OFFICE VISIT (OUTPATIENT)
Dept: FAMILY MEDICINE CLINIC | Facility: CLINIC | Age: 85
End: 2020-06-11
Payer: MEDICARE

## 2020-06-11 VITALS
DIASTOLIC BLOOD PRESSURE: 50 MMHG | OXYGEN SATURATION: 99 % | WEIGHT: 156.2 LBS | HEART RATE: 65 BPM | BODY MASS INDEX: 23.67 KG/M2 | TEMPERATURE: 97.2 F | HEIGHT: 68 IN | SYSTOLIC BLOOD PRESSURE: 90 MMHG | RESPIRATION RATE: 12 BRPM

## 2020-06-11 DIAGNOSIS — E11.22 TYPE 2 DIABETES MELLITUS WITH STAGE 4 CHRONIC KIDNEY DISEASE, WITH LONG-TERM CURRENT USE OF INSULIN (HCC): ICD-10-CM

## 2020-06-11 DIAGNOSIS — N18.4 TYPE 2 DIABETES MELLITUS WITH STAGE 4 CHRONIC KIDNEY DISEASE, WITH LONG-TERM CURRENT USE OF INSULIN (HCC): ICD-10-CM

## 2020-06-11 DIAGNOSIS — Z01.818 PRE-OP EXAMINATION: Primary | ICD-10-CM

## 2020-06-11 DIAGNOSIS — Z79.4 TYPE 2 DIABETES MELLITUS WITH STAGE 4 CHRONIC KIDNEY DISEASE, WITH LONG-TERM CURRENT USE OF INSULIN (HCC): ICD-10-CM

## 2020-06-11 PROCEDURE — 99214 OFFICE O/P EST MOD 30 MIN: CPT | Performed by: FAMILY MEDICINE

## 2020-06-11 PROCEDURE — 3044F HG A1C LEVEL LT 7.0%: CPT | Performed by: FAMILY MEDICINE

## 2020-06-24 DIAGNOSIS — N18.4 TYPE 2 DIABETES MELLITUS WITH STAGE 4 CHRONIC KIDNEY DISEASE, WITH LONG-TERM CURRENT USE OF INSULIN (HCC): ICD-10-CM

## 2020-06-24 DIAGNOSIS — Z79.4 TYPE 2 DIABETES MELLITUS WITH STAGE 4 CHRONIC KIDNEY DISEASE, WITH LONG-TERM CURRENT USE OF INSULIN (HCC): ICD-10-CM

## 2020-06-24 DIAGNOSIS — E11.22 TYPE 2 DIABETES MELLITUS WITH STAGE 4 CHRONIC KIDNEY DISEASE, WITH LONG-TERM CURRENT USE OF INSULIN (HCC): ICD-10-CM

## 2020-07-20 NOTE — TELEPHONE ENCOUNTER
Continue to monitor symptoms  If new or worsening symptoms develop, go immediately to Er  Drink plenty of fluids  Follow up with Family Doctor this week  Nutrition Tips for Relief of Diarrhea   WHAT YOU NEED TO KNOW:   There are diet changes you can make to help relieve or stop diarrhea  These changes include limiting or avoiding foods and liquids that are high in sugar, fat, fiber, and lactose  Lactose is a sugar found in milk products  Milk products can cause diarrhea in people who are lactose intolerant  You should also drink extra liquids to replace fluids that are lost when you have diarrhea  Diarrhea can lead to dehydration  DISCHARGE INSTRUCTIONS:   Foods to limit or avoid:   · Dairy:      ¨ Whole milk    ¨ Half-and-half, cream, and sour cream    ¨ Regular (whole milk) ice cream    · Grains:      ¨ Whole wheat and whole grain breads, pasta, cereals, and crackers    ¨ Brown and wild rice    ¨ Breads and cereals with seeds or nuts    ¨ Popcorn    · Fruit and vegetables:      ¨ All raw fruits, except bananas and melon    ¨ Dried fruits, including prunes and raisins    ¨ Canned fruit in heavy syrup    ¨ Prune juice and any fruit juice with pulp    ¨ Raw vegetables, except lettuce     ¨ Fried vegetables    ¨ Corn, raw and cooked broccoli, cabbage, cauliflower, and daisy greens    · Protein:      ¨ Fried meat, poultry, and fish    ¨ High-fat luncheon meats, such as bologna    ¨ Fatty meats, such as sausage, ruvalcaba, and hot dogs    ¨ Beans and nuts    · Liquids:      ¨ Sodas and fruit-flavored drinks    ¨ Drinks that contain caffeine, such as energy drinks, coffee, and tea     ¨ Drinks that contain alcohol or sugar alcohol, such as sorbitol  Foods and liquids you may eat or drink:  Most people can tolerate the foods and liquids listed below  If any of them make your symptoms worse, stop eating or drinking them until you feel better  If you are lactose intolerant, avoid milk products    · Dairy:      ¨ Skim or Jevon Dia low-fat milk or evaporated milk    ¨ Soy milk or buttermilk     ¨ Low-fat, part-skim, and aged cheese    ¨ Yogurt, low-fat ice cream, or sherbert    · Grains:  (Choose foods with less than 2 grams of dietary fiber per serving )     ¨ White or refined flour breads, bagels, pasta, and crackers    ¨ Cold or hot cereals made from white or refined flour such as puffed rice, cornflakes, or cream of wheat    ¨ White rice    · Fruit and vegetables:      ¨ Bananas or melon    ¨ Fruit juice without pulp, except prune juice    ¨ Canned fruit in juice or light syrup    ¨ Lettuce and most well-cooked vegetables without seeds or skins     ¨ Strained vegetable juice    · Protein:      ¨ Tender, well-cooked meat, poultry, or fish    ¨ Well-cooked eggs or soy foods (cooked without added fat)    ¨ Smooth nut butters    · Fats:  (Limit fats to less than 8 teaspoons a day)     ¨ Oil, butter, or margarine, or mayonnaise    ¨ Cream cheese or salad dressings    · Liquids:      ¨ For infants, breast milk or formula    ¨ Oral rehydration solution     ¨ Decaffeinated coffee or caffeine-free teas    ¨ Soft drinks without caffeine  Other guidelines to follow:   · Drink liquids as directed  You may need to drink more liquids than usual to prevent dehydration  Ask how much liquid to drink each day and which liquids are best for you  You may need to drink an oral rehydration solution (ORS)  An ORS helps replace fluids and electrolytes that you lose when you have diarrhea  · Eat small meals or snacks every 3 to 4 hours  instead of large meals  Continue eating even if you still have diarrhea  Your diarrhea will continue for a few days but should gradually go away  © 2017 [address] [employer] Information is for End User's use only and may not be sold, redistributed or otherwise used for commercial purposes  All illustrations and images included in CareNotes® are the copyrighted property of A D A M [employer]  or Deyvi Godfrey    The above information is an  only  It is not intended as medical advice for individual conditions or treatments  Talk to your doctor, nurse or pharmacist before following any medical regimen to see if it is safe and effective for you  [address]    Your healthcare provider and/or public health staff have evaluated you and have determined that you do not need to remain in the hospital at this time  At this time you can be isolated at home where you will be monitored by staff from your local or state health department  You should carefully follow the prevention and isolation steps below until a healthcare provider or local or state health department says that you can return to your normal activities  Stay home except to get medical care    People who are mildly ill with COVID-19 are able to isolate at home during their illness  You should restrict activities outside your home, except for getting medical care  Do not go to work, school, or public areas  Avoid using public transportation, ride-sharing, or taxis  Separate yourself from other people and animals in your home    People: As much as possible, you should stay in a specific room and away from other people in your home  Also, you should use a separate bathroom, if available  Animals: You should restrict contact with pets and other animals while you are sick with COVID-19, just like you would around other people  Although there have not been reports of pets or other animals becoming sick with COVID-19, it is still recommended that people sick with COVID-19 limit contact with animals until more information is known about the virus  When possible, have another member of your household care for your animals while you are sick  If you are sick with COVID-19, avoid contact with your pet, including petting, snuggling, being kissed or licked, and sharing food   If you must care for your pet or be around animals while you are sick, wash your hands before and after you interact with pets and wear a facemask  See COVID-19 and Animals for more information  Call ahead before visiting your doctor    If you have a medical appointment, call the healthcare provider and tell them that you have or may have COVID-19  This will help the healthcare providers office take steps to keep other people from getting infected or exposed  Wear a facemask    You should wear a facemask when you are around other people (e g , sharing a room or vehicle) or pets and before you enter a healthcare providers office  If you are not able to wear a facemask (for example, because it causes trouble breathing), then people who live with you should not stay in the same room with you, or they should wear a facemask if they enter your room  Cover your coughs and sneezes    Cover your mouth and nose with a tissue when you cough or sneeze  Throw used tissues in a lined trash can  Immediately wash your hands with soap and water for at least 20 seconds or, if soap and water are not available, clean your hands with an alcohol-based hand  that contains at least 60% alcohol  Clean your hands often    Wash your hands often with soap and water for at least 20 seconds, especially after blowing your nose, coughing, or sneezing; going to the bathroom; and before eating or preparing food  If soap and water are not readily available, use an alcohol-based hand  with at least 60% alcohol, covering all surfaces of your hands and rubbing them together until they feel dry  Soap and water are the best option if hands are visibly dirty  Avoid touching your eyes, nose, and mouth with unwashed hands  Avoid sharing personal household items    You should not share dishes, drinking glasses, cups, eating utensils, towels, or bedding with other people or pets in your home  After using these items, they should be washed thoroughly with soap and water      Clean all high-touch surfaces everyday    High touch surfaces include counters, tabletops, doorknobs, bathroom fixtures, toilets, phones, keyboards, tablets, and bedside tables  Also, clean any surfaces that may have blood, stool, or body fluids on them  Use a household cleaning spray or wipe, according to the label instructions  Labels contain instructions for safe and effective use of the cleaning product including precautions you should take when applying the product, such as wearing gloves and making sure you have good ventilation during use of the product  Monitor your symptoms    Seek prompt medical attention if your illness is worsening (e g , difficulty breathing)  Before seeking care, call your healthcare provider and tell them that you have, or are being evaluated for, COVID-19  Put on a facemask before you enter the facility  These steps will help the healthcare providers office to keep other people in the office or waiting room from getting infected or exposed  Ask your healthcare provider to call the local or [facility] health department  Persons who are placed under active monitoring or facilitated self-monitoring should follow instructions provided by their local health department or occupational health professionals, as appropriate  If you have a medical emergency and need to call 911, notify the dispatch personnel that you have, or are being evaluated for COVID-19  If possible, put on a facemask before emergency medical services arrive      Discontinuing home isolation    Patients with confirmed COVID-19 should remain under home isolation precautions until the following conditions are met:   - They have had no fever for at least 72 hours (that is three full days of no fever without the use medicine that reduces fevers)  AND  - other symptoms have improved (for example, when their cough or shortness of breath have improved)  AND  - at least 10 days have passed since their symptoms first appeared  Patients with confirmed COVID-19 should also notify close contacts (including their workplace) and ask that they self-quarantine  Currently, close contact is defined as being within 6 feet for 10 minutes or more from the period 48 hours before symptom onset to the time at which the patient went into isolation  Close contacts of patients diagnosed with COVID-19 should be instructed by the patient to self-quarantine for 14 days from the last time of their last contact with the patient       Source: [employer]

## 2020-07-30 DIAGNOSIS — I10 HYPERTENSION, ESSENTIAL: ICD-10-CM

## 2020-08-05 DIAGNOSIS — Z79.4 TYPE 2 DIABETES MELLITUS WITH STAGE 4 CHRONIC KIDNEY DISEASE, WITH LONG-TERM CURRENT USE OF INSULIN (HCC): ICD-10-CM

## 2020-08-05 DIAGNOSIS — E11.22 TYPE 2 DIABETES MELLITUS WITH STAGE 4 CHRONIC KIDNEY DISEASE, WITH LONG-TERM CURRENT USE OF INSULIN (HCC): ICD-10-CM

## 2020-08-05 DIAGNOSIS — N18.4 TYPE 2 DIABETES MELLITUS WITH STAGE 4 CHRONIC KIDNEY DISEASE, WITH LONG-TERM CURRENT USE OF INSULIN (HCC): ICD-10-CM

## 2020-08-06 RX ORDER — HUMAN INSULIN 100 [USP'U]/ML
30 INJECTION, SUSPENSION SUBCUTANEOUS
Qty: 10 ML | Refills: 5 | Status: SHIPPED | OUTPATIENT
Start: 2020-08-06 | End: 2021-01-05 | Stop reason: SDUPTHER

## 2020-08-11 RX ORDER — LISINOPRIL 2.5 MG/1
2.5 TABLET ORAL DAILY
Qty: 90 TABLET | Refills: 1 | Status: SHIPPED | OUTPATIENT
Start: 2020-08-11 | End: 2021-02-25 | Stop reason: SDUPTHER

## 2020-08-25 DIAGNOSIS — I50.32 CHRONIC DIASTOLIC CONGESTIVE HEART FAILURE (HCC): ICD-10-CM

## 2020-08-25 DIAGNOSIS — I10 HYPERTENSION, ESSENTIAL: ICD-10-CM

## 2020-08-25 RX ORDER — METOPROLOL SUCCINATE 50 MG/1
50 TABLET, EXTENDED RELEASE ORAL DAILY
Qty: 30 TABLET | Refills: 2 | Status: SHIPPED | OUTPATIENT
Start: 2020-08-25 | End: 2020-10-21 | Stop reason: SDUPTHER

## 2020-09-01 ENCOUNTER — OFFICE VISIT (OUTPATIENT)
Dept: FAMILY MEDICINE CLINIC | Facility: CLINIC | Age: 85
End: 2020-09-01
Payer: MEDICARE

## 2020-09-01 ENCOUNTER — TELEPHONE (OUTPATIENT)
Dept: ADMINISTRATIVE | Facility: OTHER | Age: 85
End: 2020-09-01

## 2020-09-01 VITALS
WEIGHT: 156 LBS | HEART RATE: 58 BPM | TEMPERATURE: 97.3 F | SYSTOLIC BLOOD PRESSURE: 110 MMHG | DIASTOLIC BLOOD PRESSURE: 50 MMHG | BODY MASS INDEX: 23.64 KG/M2 | OXYGEN SATURATION: 99 % | HEIGHT: 68 IN | RESPIRATION RATE: 14 BRPM

## 2020-09-01 DIAGNOSIS — Z23 NEED FOR VACCINATION: Primary | ICD-10-CM

## 2020-09-01 DIAGNOSIS — E11.22 TYPE 2 DIABETES MELLITUS WITH CHRONIC KIDNEY DISEASE, WITH LONG-TERM CURRENT USE OF INSULIN, UNSPECIFIED CKD STAGE (HCC): ICD-10-CM

## 2020-09-01 DIAGNOSIS — F02.80 ALZHEIMER'S DEMENTIA WITHOUT BEHAVIORAL DISTURBANCE, UNSPECIFIED TIMING OF DEMENTIA ONSET (HCC): ICD-10-CM

## 2020-09-01 DIAGNOSIS — Z79.4 TYPE 2 DIABETES MELLITUS WITH CHRONIC KIDNEY DISEASE, WITH LONG-TERM CURRENT USE OF INSULIN, UNSPECIFIED CKD STAGE (HCC): ICD-10-CM

## 2020-09-01 DIAGNOSIS — G30.9 ALZHEIMER'S DEMENTIA WITHOUT BEHAVIORAL DISTURBANCE, UNSPECIFIED TIMING OF DEMENTIA ONSET (HCC): ICD-10-CM

## 2020-09-01 DIAGNOSIS — G45.9 TIA (TRANSIENT ISCHEMIC ATTACK): ICD-10-CM

## 2020-09-01 PROCEDURE — 90662 IIV NO PRSV INCREASED AG IM: CPT

## 2020-09-01 PROCEDURE — G0008 ADMIN INFLUENZA VIRUS VAC: HCPCS

## 2020-09-01 PROCEDURE — 99496 TRANSJ CARE MGMT HIGH F2F 7D: CPT | Performed by: FAMILY MEDICINE

## 2020-09-01 RX ORDER — PEN NEEDLE, DIABETIC 29 G X1/2"
NEEDLE, DISPOSABLE MISCELLANEOUS 2 TIMES DAILY
Qty: 120 EACH | Refills: 3 | Status: SHIPPED | OUTPATIENT
Start: 2020-09-01

## 2020-09-01 RX ORDER — ASPIRIN 81 MG/1
81 TABLET ORAL DAILY
Qty: 90 TABLET | Refills: 5 | Status: SHIPPED | OUTPATIENT
Start: 2020-09-01

## 2020-09-01 NOTE — PROGRESS NOTES
Assessment/Plan:     No problem-specific Assessment & Plan notes found for this encounter  Diagnoses and all orders for this visit:    Need for vaccination  -     influenza vaccine, high-dose, PF 0 7 mL (FLUZONE HIGH-DOSE)    TIA (transient ischemic attack)  -     aspirin (ECOTRIN LOW STRENGTH) 81 mg EC tablet; Take 1 tablet (81 mg total) by mouth daily    Type 2 diabetes mellitus with chronic kidney disease, with long-term current use of insulin, unspecified CKD stage ([facility])  -     BD Insulin Syringe U/F 31G X 5/16" 0 5 ML MISC; by Does not apply route 2 (two) times a day    Alzheimer's dementia without behavioral disturbance, unspecified timing of dementia onset ([facility] [address] )       TIA  Maintain current aspirin therapy observe for current symptoms  Maintain scheduled follow-up with the neurology  Diabetes  A hemoglobin A1c 7 2 without hypoglycemic episodes really once a day, to continue current loss medications reviewed with caregiver  Atrial fibrillation  Stable, to continue current medications maintain scheduled follow-up with Cardiology    Subjective:     Patient ID: Garfield Couch is a 80 y o  male      Here to f/u admit   Here with care giver Ms Viky Cano, live in   After sudden onset of change in mental status per care for also some gait issues  Presented to [facility] 8/27 with complaint of slurred speech and left-sided weakness at that time caregiver noted, denies any fall or head injury, currently under care for atrial fibrillation known history left internal artery stenosis, Cardiology=md fitzgerald  Discharge approximately 2 days, was considered for inpatient rehab refused by family, seen by neurologist, final diagnosis transient ischemic attack  Per caregiver has since returned to baseline no issues or concerns at this point, continues to monitor home blood sugars, denies any hypoglycemic episodes  Has scheduled follow-up with Neurology  Has scheduled follow-up with Cardiology  Negative chest pain palpitations shortness of breath difficulty breathing cough lesions rash  At that time EMS called transported to [place] [address]   Constitutional: Negative for appetite change, chills, fever and unexpected weight change  HENT: Negative for congestion, dental problem, ear pain, hearing loss, postnasal drip, rhinorrhea, sinus pressure, sinus pain, sneezing, sore throat, tinnitus and voice change  Eyes: Negative for visual disturbance  Respiratory: Negative for apnea, cough, chest tightness and shortness of breath  Cardiovascular: Negative for chest pain, palpitations and leg swelling  Gastrointestinal: Negative for abdominal pain, blood in stool, constipation, diarrhea, nausea and vomiting  Endocrine: Negative for cold intolerance, heat intolerance, polydipsia, polyphagia and polyuria  Genitourinary: Negative for decreased urine volume, difficulty urinating, dysuria, frequency and hematuria  Musculoskeletal: Negative for arthralgias, back pain, gait problem, joint swelling and myalgias  Skin: Negative for color change, rash and wound  Allergic/Immunologic: Negative for environmental allergies and food allergies  Neurological: Negative for dizziness, syncope, weakness, light-headedness, numbness and headaches  Hematological: Negative for adenopathy  Does not bruise/bleed easily  Psychiatric/Behavioral: Negative for sleep disturbance and suicidal ideas  The patient is not nervous/anxious  Objective:     Physical Exam  Constitutional:       General: He is not in acute distress  Appearance: He is not ill-appearing or toxic-appearing  Comments: Alzheimer's, conversational, pleasant   HENT:      Head: Normocephalic and atraumatic  Eyes:      General: No scleral icterus  Conjunctiva/sclera: Conjunctivae normal    Neck:      Musculoskeletal: Normal range of motion  Cardiovascular:      Rate and Rhythm: Normal rate  Pulses: Normal pulses  Pulmonary:      Effort: Pulmonary effort is normal    Musculoskeletal:      Comments: Ambulating with assistance, 4 point walker   Skin:     General: Skin is warm and dry  Neurological:      Mental Status: He is alert  Vitals:    09/01/20 1112   BP: 110/50   BP Location: Left arm   Patient Position: Sitting   Cuff Size: Adult   Pulse: 58   Resp: 14   Temp: (!) 97 3 °F (36 3 °C)   SpO2: 99%   Weight: 70 8 kg (156 lb)   Height: 5' 8" (1 727 m)       Transitional Care Management Review:  William Mccloud is a 80 y o  male here for TCM follow up       During the TCM phone call patient stated:    TCM Call (since 8/1/2020)     None      TCM Call (since 8/1/2020)     None          TOO Elias

## 2020-09-01 NOTE — TELEPHONE ENCOUNTER
----- Message from [facility] sent at 9/1/2020 11:39 AM EDT -----  Regarding: Hemoglobin A1C  09/01/20 11:39 AM    Hello, our patient Monica Garner has had A1C completed/performed  Please assist in updating the patient chart by looking into the lab section of the patients chart  The date of service is 08/28/2020      Thank you,  [facility]  [facility] [address] [facility]

## 2020-09-01 NOTE — TELEPHONE ENCOUNTER
Upon review of the In Basket request we were able to locate, review, and update the patient chart as requested for Hemoglobin A1c  Any additional questions or concerns should be emailed to the Practice Liaisons via [email or web address] email, please do not reply via In Basket      Thank you  Camilo Briggs, [address]

## 2020-09-22 ENCOUNTER — TELEPHONE (OUTPATIENT)
Dept: FAMILY MEDICINE CLINIC | Facility: CLINIC | Age: 85
End: 2020-09-22

## 2020-09-22 DIAGNOSIS — R26.9 GAIT DISORDER: ICD-10-CM

## 2020-09-22 DIAGNOSIS — I50.9 CHRONIC CONGESTIVE HEART FAILURE, UNSPECIFIED HEART FAILURE TYPE (HCC): ICD-10-CM

## 2020-09-22 DIAGNOSIS — G30.9 ALZHEIMER'S DEMENTIA WITHOUT BEHAVIORAL DISTURBANCE, UNSPECIFIED TIMING OF DEMENTIA ONSET (HCC): Primary | ICD-10-CM

## 2020-09-22 DIAGNOSIS — F02.80 ALZHEIMER'S DEMENTIA WITHOUT BEHAVIORAL DISTURBANCE, UNSPECIFIED TIMING OF DEMENTIA ONSET (HCC): Primary | ICD-10-CM

## 2020-09-22 DIAGNOSIS — R53.81 PHYSICAL DECONDITIONING: ICD-10-CM

## 2020-09-29 DIAGNOSIS — E78.2 MIXED HYPERLIPIDEMIA: Primary | ICD-10-CM

## 2020-09-29 RX ORDER — ATORVASTATIN CALCIUM 20 MG/1
20 TABLET, FILM COATED ORAL DAILY
Qty: 30 TABLET | Refills: 3 | Status: SHIPPED | OUTPATIENT
Start: 2020-09-29 | End: 2020-09-30 | Stop reason: SDUPTHER

## 2020-09-29 RX ORDER — ATORVASTATIN CALCIUM 20 MG/1
20 TABLET, FILM COATED ORAL DAILY
COMMUNITY
End: 2020-09-29 | Stop reason: SDUPTHER

## 2020-09-29 NOTE — TELEPHONE ENCOUNTER
Patient is requesting Lipitor stating that he was discharged with it from the ER  Medication is not listed  I checked last labs

## 2020-09-30 DIAGNOSIS — E78.2 MIXED HYPERLIPIDEMIA: ICD-10-CM

## 2020-09-30 RX ORDER — ATORVASTATIN CALCIUM 20 MG/1
20 TABLET, FILM COATED ORAL DAILY
Qty: 30 TABLET | Refills: 3 | Status: SHIPPED | OUTPATIENT
Start: 2020-09-30 | End: 2020-12-30 | Stop reason: SDUPTHER

## 2020-10-07 ENCOUNTER — TELEPHONE (OUTPATIENT)
Dept: FAMILY MEDICINE CLINIC | Facility: CLINIC | Age: 85
End: 2020-10-07

## 2020-10-07 DIAGNOSIS — F02.80 ALZHEIMER'S DEMENTIA WITHOUT BEHAVIORAL DISTURBANCE, UNSPECIFIED TIMING OF DEMENTIA ONSET (HCC): Primary | ICD-10-CM

## 2020-10-07 DIAGNOSIS — G30.9 ALZHEIMER'S DEMENTIA WITHOUT BEHAVIORAL DISTURBANCE, UNSPECIFIED TIMING OF DEMENTIA ONSET (HCC): Primary | ICD-10-CM

## 2020-10-09 ENCOUNTER — TELEPHONE (OUTPATIENT)
Dept: FAMILY MEDICINE CLINIC | Facility: CLINIC | Age: 85
End: 2020-10-09

## 2020-10-21 DIAGNOSIS — I50.32 CHRONIC DIASTOLIC CONGESTIVE HEART FAILURE (HCC): ICD-10-CM

## 2020-10-21 DIAGNOSIS — Z79.4 TYPE 2 DIABETES MELLITUS WITH CHRONIC KIDNEY DISEASE, WITH LONG-TERM CURRENT USE OF INSULIN, UNSPECIFIED CKD STAGE (HCC): ICD-10-CM

## 2020-10-21 DIAGNOSIS — I10 HYPERTENSION, ESSENTIAL: ICD-10-CM

## 2020-10-21 DIAGNOSIS — E11.22 TYPE 2 DIABETES MELLITUS WITH CHRONIC KIDNEY DISEASE, WITH LONG-TERM CURRENT USE OF INSULIN, UNSPECIFIED CKD STAGE (HCC): ICD-10-CM

## 2020-10-21 RX ORDER — METOPROLOL SUCCINATE 50 MG/1
50 TABLET, EXTENDED RELEASE ORAL DAILY
Qty: 30 TABLET | Refills: 2 | Status: SHIPPED | OUTPATIENT
Start: 2020-10-21 | End: 2021-02-25 | Stop reason: SDUPTHER

## 2020-10-22 DIAGNOSIS — I10 ESSENTIAL HYPERTENSION: ICD-10-CM

## 2020-10-22 RX ORDER — FUROSEMIDE 20 MG/1
20 TABLET ORAL DAILY
Qty: 30 TABLET | Refills: 2 | Status: SHIPPED | OUTPATIENT
Start: 2020-10-22 | End: 2020-10-27 | Stop reason: SDUPTHER

## 2020-10-27 DIAGNOSIS — I10 ESSENTIAL HYPERTENSION: ICD-10-CM

## 2020-10-28 RX ORDER — FUROSEMIDE 20 MG/1
20 TABLET ORAL DAILY
Qty: 30 TABLET | Refills: 2 | Status: SHIPPED | OUTPATIENT
Start: 2020-10-28 | End: 2021-02-25 | Stop reason: SDUPTHER

## 2020-10-30 ENCOUNTER — TELEPHONE (OUTPATIENT)
Dept: FAMILY MEDICINE CLINIC | Facility: CLINIC | Age: 85
End: 2020-10-30

## 2020-11-04 ENCOUNTER — OFFICE VISIT (OUTPATIENT)
Dept: FAMILY MEDICINE CLINIC | Facility: CLINIC | Age: 85
End: 2020-11-04
Payer: MEDICARE

## 2020-11-04 VITALS
DIASTOLIC BLOOD PRESSURE: 60 MMHG | OXYGEN SATURATION: 97 % | HEIGHT: 68 IN | BODY MASS INDEX: 27.13 KG/M2 | RESPIRATION RATE: 16 BRPM | SYSTOLIC BLOOD PRESSURE: 120 MMHG | TEMPERATURE: 96.4 F | HEART RATE: 68 BPM | WEIGHT: 179 LBS

## 2020-11-04 DIAGNOSIS — I50.32 CHRONIC DIASTOLIC CONGESTIVE HEART FAILURE (HCC): ICD-10-CM

## 2020-11-04 DIAGNOSIS — Z79.4 TYPE 2 DIABETES MELLITUS WITH CHRONIC KIDNEY DISEASE, WITH LONG-TERM CURRENT USE OF INSULIN, UNSPECIFIED CKD STAGE (HCC): ICD-10-CM

## 2020-11-04 DIAGNOSIS — G45.9 TIA (TRANSIENT ISCHEMIC ATTACK): Primary | ICD-10-CM

## 2020-11-04 DIAGNOSIS — E11.22 TYPE 2 DIABETES MELLITUS WITH CHRONIC KIDNEY DISEASE, WITH LONG-TERM CURRENT USE OF INSULIN, UNSPECIFIED CKD STAGE (HCC): ICD-10-CM

## 2020-11-04 PROCEDURE — 99214 OFFICE O/P EST MOD 30 MIN: CPT | Performed by: FAMILY MEDICINE

## 2020-11-09 ENCOUNTER — OFFICE VISIT (OUTPATIENT)
Dept: FAMILY MEDICINE CLINIC | Facility: CLINIC | Age: 85
End: 2020-11-09
Payer: MEDICARE

## 2020-11-09 VITALS
SYSTOLIC BLOOD PRESSURE: 140 MMHG | RESPIRATION RATE: 16 BRPM | TEMPERATURE: 97.8 F | BODY MASS INDEX: 26.75 KG/M2 | HEART RATE: 92 BPM | OXYGEN SATURATION: 96 % | DIASTOLIC BLOOD PRESSURE: 60 MMHG | HEIGHT: 68 IN | WEIGHT: 176.5 LBS

## 2020-11-09 DIAGNOSIS — G40.909 SEIZURE DISORDER (HCC): Primary | ICD-10-CM

## 2020-11-09 DIAGNOSIS — F02.80 ALZHEIMER'S DEMENTIA WITHOUT BEHAVIORAL DISTURBANCE, UNSPECIFIED TIMING OF DEMENTIA ONSET (HCC): ICD-10-CM

## 2020-11-09 DIAGNOSIS — G30.9 ALZHEIMER'S DEMENTIA WITHOUT BEHAVIORAL DISTURBANCE, UNSPECIFIED TIMING OF DEMENTIA ONSET (HCC): ICD-10-CM

## 2020-11-09 PROCEDURE — 99214 OFFICE O/P EST MOD 30 MIN: CPT | Performed by: FAMILY MEDICINE

## 2020-11-09 RX ORDER — LEVETIRACETAM 250 MG/1
250 TABLET ORAL 2 TIMES DAILY
COMMUNITY

## 2020-11-10 ENCOUNTER — TELEPHONE (OUTPATIENT)
Dept: FAMILY MEDICINE CLINIC | Facility: CLINIC | Age: 85
End: 2020-11-10

## 2020-11-16 ENCOUNTER — TELEPHONE (OUTPATIENT)
Dept: FAMILY MEDICINE CLINIC | Facility: CLINIC | Age: 85
End: 2020-11-16

## 2020-11-16 DIAGNOSIS — I50.32 CHRONIC DIASTOLIC CONGESTIVE HEART FAILURE (HCC): ICD-10-CM

## 2020-11-16 DIAGNOSIS — N18.4 TYPE 2 DIABETES MELLITUS WITH STAGE 4 CHRONIC KIDNEY DISEASE, WITH LONG-TERM CURRENT USE OF INSULIN (HCC): ICD-10-CM

## 2020-11-16 DIAGNOSIS — F02.80 ALZHEIMER'S DEMENTIA WITHOUT BEHAVIORAL DISTURBANCE, UNSPECIFIED TIMING OF DEMENTIA ONSET (HCC): Primary | ICD-10-CM

## 2020-11-16 DIAGNOSIS — F03.90 DEMENTIA WITHOUT BEHAVIORAL DISTURBANCE, UNSPECIFIED DEMENTIA TYPE (HCC): ICD-10-CM

## 2020-11-16 DIAGNOSIS — Z79.4 TYPE 2 DIABETES MELLITUS WITH STAGE 4 CHRONIC KIDNEY DISEASE, WITH LONG-TERM CURRENT USE OF INSULIN (HCC): ICD-10-CM

## 2020-11-16 DIAGNOSIS — E11.22 TYPE 2 DIABETES MELLITUS WITH STAGE 4 CHRONIC KIDNEY DISEASE, WITH LONG-TERM CURRENT USE OF INSULIN (HCC): ICD-10-CM

## 2020-11-16 DIAGNOSIS — G30.9 ALZHEIMER'S DEMENTIA WITHOUT BEHAVIORAL DISTURBANCE, UNSPECIFIED TIMING OF DEMENTIA ONSET (HCC): Primary | ICD-10-CM

## 2020-12-30 DIAGNOSIS — Z79.4 TYPE 2 DIABETES MELLITUS WITH CHRONIC KIDNEY DISEASE, WITH LONG-TERM CURRENT USE OF INSULIN, UNSPECIFIED CKD STAGE (HCC): ICD-10-CM

## 2020-12-30 DIAGNOSIS — E11.22 TYPE 2 DIABETES MELLITUS WITH CHRONIC KIDNEY DISEASE, WITH LONG-TERM CURRENT USE OF INSULIN, UNSPECIFIED CKD STAGE (HCC): ICD-10-CM

## 2020-12-30 DIAGNOSIS — E78.2 MIXED HYPERLIPIDEMIA: ICD-10-CM

## 2020-12-30 DIAGNOSIS — G45.9 TIA (TRANSIENT ISCHEMIC ATTACK): Primary | ICD-10-CM

## 2020-12-31 ENCOUNTER — TELEPHONE (OUTPATIENT)
Dept: FAMILY MEDICINE CLINIC | Facility: CLINIC | Age: 85
End: 2020-12-31

## 2020-12-31 RX ORDER — ATORVASTATIN CALCIUM 20 MG/1
20 TABLET, FILM COATED ORAL DAILY
Qty: 30 TABLET | Refills: 3 | Status: SHIPPED | OUTPATIENT
Start: 2020-12-31 | End: 2021-01-05 | Stop reason: SDUPTHER

## 2021-01-05 DIAGNOSIS — N18.4 TYPE 2 DIABETES MELLITUS WITH STAGE 4 CHRONIC KIDNEY DISEASE, WITH LONG-TERM CURRENT USE OF INSULIN (HCC): ICD-10-CM

## 2021-01-05 DIAGNOSIS — Z79.4 TYPE 2 DIABETES MELLITUS WITH STAGE 4 CHRONIC KIDNEY DISEASE, WITH LONG-TERM CURRENT USE OF INSULIN (HCC): ICD-10-CM

## 2021-01-05 DIAGNOSIS — E78.2 MIXED HYPERLIPIDEMIA: ICD-10-CM

## 2021-01-05 DIAGNOSIS — E11.22 TYPE 2 DIABETES MELLITUS WITH CHRONIC KIDNEY DISEASE, WITH LONG-TERM CURRENT USE OF INSULIN, UNSPECIFIED CKD STAGE (HCC): ICD-10-CM

## 2021-01-05 DIAGNOSIS — E11.22 TYPE 2 DIABETES MELLITUS WITH STAGE 4 CHRONIC KIDNEY DISEASE, WITH LONG-TERM CURRENT USE OF INSULIN (HCC): ICD-10-CM

## 2021-01-05 DIAGNOSIS — G45.9 TIA (TRANSIENT ISCHEMIC ATTACK): ICD-10-CM

## 2021-01-05 DIAGNOSIS — Z79.4 TYPE 2 DIABETES MELLITUS WITH CHRONIC KIDNEY DISEASE, WITH LONG-TERM CURRENT USE OF INSULIN, UNSPECIFIED CKD STAGE (HCC): ICD-10-CM

## 2021-01-05 RX ORDER — ATORVASTATIN CALCIUM 20 MG/1
20 TABLET, FILM COATED ORAL DAILY
Qty: 30 TABLET | Refills: 3 | Status: SHIPPED | OUTPATIENT
Start: 2021-01-05

## 2021-01-05 RX ORDER — HUMAN INSULIN 100 [USP'U]/ML
30 INJECTION, SUSPENSION SUBCUTANEOUS
Qty: 10 ML | Refills: 5 | Status: SHIPPED | OUTPATIENT
Start: 2021-01-05

## 2021-01-05 NOTE — TELEPHONE ENCOUNTER
Patient is calling for medication refill    metFORMIN (GLUCOPHAGE) 1000 MG tablet     apixaban (Eliquis) 2 5 mg     atorvastatin (LIPITOR) 20 mg tablet    insulin NPH-insulin regular (NovoLIN 70/30) 100 units/mL subcutaneous injection

## 2021-01-22 DIAGNOSIS — G45.9 TIA (TRANSIENT ISCHEMIC ATTACK): ICD-10-CM

## 2021-02-25 DIAGNOSIS — G45.9 TIA (TRANSIENT ISCHEMIC ATTACK): ICD-10-CM

## 2021-02-25 DIAGNOSIS — I10 ESSENTIAL HYPERTENSION: ICD-10-CM

## 2021-02-25 DIAGNOSIS — I10 HYPERTENSION, ESSENTIAL: ICD-10-CM

## 2021-02-25 DIAGNOSIS — I50.32 CHRONIC DIASTOLIC CONGESTIVE HEART FAILURE (HCC): ICD-10-CM

## 2021-02-25 RX ORDER — FUROSEMIDE 20 MG/1
20 TABLET ORAL DAILY
Qty: 30 TABLET | Refills: 2 | Status: SHIPPED | OUTPATIENT
Start: 2021-02-25

## 2021-02-25 RX ORDER — METOPROLOL SUCCINATE 50 MG/1
50 TABLET, EXTENDED RELEASE ORAL DAILY
Qty: 30 TABLET | Refills: 2 | Status: SHIPPED | OUTPATIENT
Start: 2021-02-25

## 2021-02-25 RX ORDER — LISINOPRIL 2.5 MG/1
2.5 TABLET ORAL DAILY
Qty: 90 TABLET | Refills: 1 | Status: SHIPPED | OUTPATIENT
Start: 2021-02-25

## 2021-03-23 ENCOUNTER — TELEPHONE (OUTPATIENT)
Dept: OTHER | Facility: OTHER | Age: 86
End: 2021-03-23

## 2021-06-02 NOTE — TELEPHONE ENCOUNTER
FORWARDING MSG  TO DR Francisco Kat - IS THIS LIST CORRECT
Please fax to them patient's current medication list  Fax # [phone]   They will be doing blister packing and need to be sure of what medications  he is taking   
When I last saw him he could not tell me what he was taking and neither could his sons    They need to send him back in for a revisit so we can start over at this point my assumption is he is taking nothing
[place]

## 2021-06-04 NOTE — ASSESSMENT & PLAN NOTE
Mother notified. H.DeGree, CMA     · Dementia reportedly previously on Donepezil  Monitor for any signs of sundowning

## (undated) DEVICE — IODOFORM PACKING STRIP: Brand: CURITY

## (undated) DEVICE — INTENDED FOR TISSUE SEPARATION, AND OTHER PROCEDURES THAT REQUIRE A SHARP SURGICAL BLADE TO PUNCTURE OR CUT.: Brand: BARD-PARKER ® CARBON RIB-BACK BLADES

## (undated) DEVICE — GLOVE INDICATOR PI UNDERGLOVE SZ 7 BLUE

## (undated) DEVICE — LIGHT HANDLE COVER SLEEVE DISP BLUE STELLAR

## (undated) DEVICE — SUT VICRYL 3-0 SH 27 IN J416H

## (undated) DEVICE — OCCLUSIVE GAUZE STRIP,3% BISMUTH TRIBROMOPHENATE IN PETROLATUM BLEND: Brand: XEROFORM

## (undated) DEVICE — DRAPE EQUIPMENT RF WAND

## (undated) DEVICE — STANDARD SURGICAL GOWN, L: Brand: CONVERTORS

## (undated) DEVICE — STOCKINETTE REGULAR

## (undated) DEVICE — MEDI-VAC YANK SUCT HNDL W/TPRD BULBOUS TIP: Brand: CARDINAL HEALTH

## (undated) DEVICE — PAD GROUNDING ADULT

## (undated) DEVICE — SUT VICRYL 4-0 SH 27 IN J415H

## (undated) DEVICE — BETHLEHEM UNIVERSAL  MIONR EXT: Brand: CARDINAL HEALTH

## (undated) DEVICE — TUBING SUCTION 5MM X 12 FT